# Patient Record
Sex: FEMALE | Race: OTHER | HISPANIC OR LATINO
[De-identification: names, ages, dates, MRNs, and addresses within clinical notes are randomized per-mention and may not be internally consistent; named-entity substitution may affect disease eponyms.]

---

## 2017-03-23 ENCOUNTER — APPOINTMENT (OUTPATIENT)
Dept: OTOLARYNGOLOGY | Facility: CLINIC | Age: 52
End: 2017-03-23

## 2017-03-23 VITALS — HEART RATE: 97 BPM | DIASTOLIC BLOOD PRESSURE: 87 MMHG | OXYGEN SATURATION: 96 % | SYSTOLIC BLOOD PRESSURE: 121 MMHG

## 2017-12-16 ENCOUNTER — EMERGENCY (EMERGENCY)
Facility: HOSPITAL | Age: 52
LOS: 1 days | Discharge: HOME | End: 2017-12-16
Attending: EMERGENCY MEDICINE | Admitting: EMERGENCY MEDICINE
Payer: COMMERCIAL

## 2017-12-16 VITALS
TEMPERATURE: 98 F | DIASTOLIC BLOOD PRESSURE: 91 MMHG | OXYGEN SATURATION: 100 % | RESPIRATION RATE: 18 BRPM | HEART RATE: 97 BPM | SYSTOLIC BLOOD PRESSURE: 154 MMHG

## 2017-12-16 DIAGNOSIS — E89.0 POSTPROCEDURAL HYPOTHYROIDISM: Chronic | ICD-10-CM

## 2017-12-16 LAB
ALBUMIN SERPL ELPH-MCNC: 3.9 G/DL — SIGNIFICANT CHANGE UP (ref 3.3–5)
ALP SERPL-CCNC: 40 U/L — SIGNIFICANT CHANGE UP (ref 40–120)
ALT FLD-CCNC: 10 U/L — SIGNIFICANT CHANGE UP (ref 4–33)
APTT BLD: 35.3 SEC — SIGNIFICANT CHANGE UP (ref 27.5–37.4)
AST SERPL-CCNC: 13 U/L — SIGNIFICANT CHANGE UP (ref 4–32)
BASOPHILS # BLD AUTO: 0.06 K/UL — SIGNIFICANT CHANGE UP (ref 0–0.2)
BASOPHILS NFR BLD AUTO: 0.8 % — SIGNIFICANT CHANGE UP (ref 0–2)
BILIRUB SERPL-MCNC: 0.3 MG/DL — SIGNIFICANT CHANGE UP (ref 0.2–1.2)
BUN SERPL-MCNC: 11 MG/DL — SIGNIFICANT CHANGE UP (ref 7–23)
CALCIUM SERPL-MCNC: 8.5 MG/DL — SIGNIFICANT CHANGE UP (ref 8.4–10.5)
CHLORIDE SERPL-SCNC: 104 MMOL/L — SIGNIFICANT CHANGE UP (ref 98–107)
CK MB BLD-MCNC: 1.54 NG/ML — SIGNIFICANT CHANGE UP (ref 1–4.7)
CK MB BLD-MCNC: 1.8 NG/ML — SIGNIFICANT CHANGE UP (ref 1–4.7)
CK MB BLD-MCNC: SIGNIFICANT CHANGE UP (ref 0–2.5)
CK SERPL-CCNC: 71 U/L — SIGNIFICANT CHANGE UP (ref 25–170)
CK SERPL-CCNC: 84 U/L — SIGNIFICANT CHANGE UP (ref 25–170)
CO2 SERPL-SCNC: 26 MMOL/L — SIGNIFICANT CHANGE UP (ref 22–31)
CREAT SERPL-MCNC: 0.7 MG/DL — SIGNIFICANT CHANGE UP (ref 0.5–1.3)
D DIMER BLD IA.RAPID-MCNC: 170 NG/ML — SIGNIFICANT CHANGE UP
EOSINOPHIL # BLD AUTO: 0.14 K/UL — SIGNIFICANT CHANGE UP (ref 0–0.5)
EOSINOPHIL NFR BLD AUTO: 2 % — SIGNIFICANT CHANGE UP (ref 0–6)
GLUCOSE SERPL-MCNC: 66 MG/DL — LOW (ref 70–99)
HBA1C BLD-MCNC: 8.1 % — HIGH (ref 4–5.6)
HCG SERPL-ACNC: < 5 MIU/ML — SIGNIFICANT CHANGE UP
HCT VFR BLD CALC: 43.9 % — SIGNIFICANT CHANGE UP (ref 34.5–45)
HGB BLD-MCNC: 14.3 G/DL — SIGNIFICANT CHANGE UP (ref 11.5–15.5)
IMM GRANULOCYTES # BLD AUTO: 0.02 # — SIGNIFICANT CHANGE UP
IMM GRANULOCYTES NFR BLD AUTO: 0.3 % — SIGNIFICANT CHANGE UP (ref 0–1.5)
INR BLD: 0.95 — SIGNIFICANT CHANGE UP (ref 0.88–1.17)
LYMPHOCYTES # BLD AUTO: 1.96 K/UL — SIGNIFICANT CHANGE UP (ref 1–3.3)
LYMPHOCYTES # BLD AUTO: 27.6 % — SIGNIFICANT CHANGE UP (ref 13–44)
MCHC RBC-ENTMCNC: 29.4 PG — SIGNIFICANT CHANGE UP (ref 27–34)
MCHC RBC-ENTMCNC: 32.6 % — SIGNIFICANT CHANGE UP (ref 32–36)
MCV RBC AUTO: 90.1 FL — SIGNIFICANT CHANGE UP (ref 80–100)
MONOCYTES # BLD AUTO: 0.51 K/UL — SIGNIFICANT CHANGE UP (ref 0–0.9)
MONOCYTES NFR BLD AUTO: 7.2 % — SIGNIFICANT CHANGE UP (ref 2–14)
NEUTROPHILS # BLD AUTO: 4.41 K/UL — SIGNIFICANT CHANGE UP (ref 1.8–7.4)
NEUTROPHILS NFR BLD AUTO: 62.1 % — SIGNIFICANT CHANGE UP (ref 43–77)
NRBC # FLD: 0 — SIGNIFICANT CHANGE UP
PLATELET # BLD AUTO: 353 K/UL — SIGNIFICANT CHANGE UP (ref 150–400)
PMV BLD: 9.8 FL — SIGNIFICANT CHANGE UP (ref 7–13)
POTASSIUM SERPL-MCNC: 4 MMOL/L — SIGNIFICANT CHANGE UP (ref 3.5–5.3)
POTASSIUM SERPL-SCNC: 4 MMOL/L — SIGNIFICANT CHANGE UP (ref 3.5–5.3)
PROT SERPL-MCNC: 7.1 G/DL — SIGNIFICANT CHANGE UP (ref 6–8.3)
PROTHROM AB SERPL-ACNC: 10.5 SEC — SIGNIFICANT CHANGE UP (ref 9.8–13.1)
RBC # BLD: 4.87 M/UL — SIGNIFICANT CHANGE UP (ref 3.8–5.2)
RBC # FLD: 13.6 % — SIGNIFICANT CHANGE UP (ref 10.3–14.5)
SODIUM SERPL-SCNC: 141 MMOL/L — SIGNIFICANT CHANGE UP (ref 135–145)
T3 SERPL-MCNC: 64.8 NG/DL — LOW (ref 80–200)
T4 AB SER-ACNC: 6.3 UG/DL — SIGNIFICANT CHANGE UP (ref 5.1–13)
TROPONIN T SERPL-MCNC: < 0.06 NG/ML — SIGNIFICANT CHANGE UP (ref 0–0.06)
TROPONIN T SERPL-MCNC: < 0.06 NG/ML — SIGNIFICANT CHANGE UP (ref 0–0.06)
TSH SERPL-MCNC: 3.26 UIU/ML — SIGNIFICANT CHANGE UP (ref 0.27–4.2)
WBC # BLD: 7.1 K/UL — SIGNIFICANT CHANGE UP (ref 3.8–10.5)
WBC # FLD AUTO: 7.1 K/UL — SIGNIFICANT CHANGE UP (ref 3.8–10.5)

## 2017-12-16 PROCEDURE — 99220: CPT

## 2017-12-16 PROCEDURE — 71010: CPT | Mod: 26

## 2017-12-16 RX ORDER — KETOROLAC TROMETHAMINE 30 MG/ML
30 SYRINGE (ML) INJECTION ONCE
Qty: 0 | Refills: 0 | Status: DISCONTINUED | OUTPATIENT
Start: 2017-12-16 | End: 2017-12-16

## 2017-12-16 RX ORDER — LEVOTHYROXINE SODIUM 125 MCG
200 TABLET ORAL DAILY
Qty: 0 | Refills: 0 | Status: DISCONTINUED | OUTPATIENT
Start: 2017-12-17 | End: 2017-12-20

## 2017-12-16 RX ADMIN — Medication 30 MILLIGRAM(S): at 23:10

## 2017-12-16 NOTE — ED CDU PROVIDER INITIAL DAY NOTE - MEDICAL DECISION MAKING DETAILS
53 y/o female Type 1 DM c/o palpitations and chest pressure  -r/o acs  -tele, ce x 2, ddimer, nuclear stress test

## 2017-12-16 NOTE — ED ADULT NURSE NOTE - OBJECTIVE STATEMENT
Pt received to rm 27, a&ox3, s/p thyroidectomy in 2003 due to thyroid CA and been taking synthroid ever since. Pt reports to ED today with c/o palpitations, chest pain, sob, and feeling tired over the past 3 days - states s/s stopped as soon as pt walked into ED. Pt currently taking synthroid 200mcg daily for the past year. EKG performed in triage, sinus tachycardia on cm, vs as noted. Family member by bedside. Awaiting MD merchant.

## 2017-12-16 NOTE — ED CDU PROVIDER INITIAL DAY NOTE - OBJECTIVE STATEMENT
52F DM1, thyroid ca s/p thyroidectomy now on synthroid, presents with palpitations. Says she has been having 15-20% part of her day with her heart experiencing palpitations which is not normal for her. She sometimes get palpitations when her sugars are high and was concerned. Patient says also for the past 4 days has been having chest pressure following pilates work out with pressure sensation on her left and right shoulder. Has noticed that she has been developing chest pressure when she exerts herself and now becomes more short of breath after walking up half a flight of stairs. She denies n/v/d/f/c.    CDU Note: Agree with above. 53 y/o female type 1 DM c/o palpitations and chest pressure x 3-4 days worsening. In ED ce x 1 negative, ekg normal, ddimer negative. Sent to CDU for r/o acs - tele, ce x 2, nuclear stress test. Pt. currently asymptomatic resting comfortably.

## 2017-12-16 NOTE — ED PROVIDER NOTE - OBJECTIVE STATEMENT
52F DM1, thyroid ca s/p thyroidectomy now on synthroid, presents with palpitations. Says she has been having 15-20% part of her day with her heart experiencing palpitations which is not normal for her. She sometimes get palpitations when her sugars are high and was concerned. Patient says also for the past 4 days has been having chest pressure following pilates work out with pressure sensation on her left and right shoulder. Has noticed that she has been developing chest pressure when she exerts herself and now becomes more short of breath after walking up half a flight of stairs. She denies n/v/d/f/c.

## 2017-12-16 NOTE — ED PROVIDER NOTE - ATTENDING CONTRIBUTION TO CARE
I performed a face-to-face evaluation of the patient and performed a history and physical examination. I agree with the history and physical examination.    52 F, DM, p/w palpitations and chest heaviness w/ exertion (not related to hypoglycemia). No VTE risks. No F/C. Appears well. NAD. Afebrile. Vital signs unremarkable. Strong pulse. Respirations unlabored. No LE edema. Concern for CAD. Admit to CDU for cardiac stress test.

## 2017-12-16 NOTE — ED CDU PROVIDER INITIAL DAY NOTE - ATTENDING CONTRIBUTION TO CARE
52F h/o DM, thyroid cancer s/p thyroidectomy presents with palpitations and CP.  Reports CP is a heaviness, worse w exertion.  + SOB.  No leg pain or swelling.  No recent illness, no cough, no fever.  In ED EKG NSR, no acute ischemia, labs including TSH, trop negative. On exam NAD, mmm, lungs CTAB, reg tachycardia, abdomen soft NT/ND, no edema, 2+ pulses b/l, neuro A&Ox3, no focal deficits, skin warm and dry, no rash.  Plan for observation for serial troponin and stress.  Plan for d-dimer given tachycardia.

## 2017-12-17 VITALS
DIASTOLIC BLOOD PRESSURE: 77 MMHG | HEART RATE: 91 BPM | OXYGEN SATURATION: 98 % | RESPIRATION RATE: 16 BRPM | TEMPERATURE: 99 F | SYSTOLIC BLOOD PRESSURE: 131 MMHG

## 2017-12-17 PROCEDURE — 93018 CV STRESS TEST I&R ONLY: CPT | Mod: GC

## 2017-12-17 PROCEDURE — 99217: CPT

## 2017-12-17 PROCEDURE — 93016 CV STRESS TEST SUPVJ ONLY: CPT | Mod: GC

## 2017-12-17 PROCEDURE — 78452 HT MUSCLE IMAGE SPECT MULT: CPT | Mod: 26

## 2017-12-17 PROCEDURE — 93306 TTE W/DOPPLER COMPLETE: CPT | Mod: 26

## 2017-12-17 RX ADMIN — Medication 200 MICROGRAM(S): at 06:21

## 2017-12-17 RX ADMIN — Medication 30 MILLIGRAM(S): at 01:00

## 2017-12-17 NOTE — ED CDU PROVIDER SUBSEQUENT DAY NOTE - SKIN, MLM
Skin normal color for race, warm, dry and intact. No evidence of rash.
Skin normal color for race, warm, dry and intact.

## 2017-12-17 NOTE — ED CDU PROVIDER DISPOSITION NOTE - CLINICAL COURSE
52F h/o DM, thyroid cancer s/p thyroidectomy presents with palpitations and CP, worse w exertion. + SOB.  EKG NSR, no acute ischemia, labs including TSH, serial trop, d-dimer negative. Stress negative.  ECHO normal.  Medically stable for discharge home with cardiology follow-up..

## 2017-12-17 NOTE — ED CDU PROVIDER SUBSEQUENT DAY NOTE - RESPIRATORY, MLM
Breath sounds clear and equal bilaterally.
Breath sounds clear and equal bilaterally. No rales, rhonchi or wheezing b/l. +reproducible b/l pectoral pain

## 2017-12-17 NOTE — ED CDU PROVIDER DISPOSITION NOTE - PLAN OF CARE
Follow up with your primary care provider and/or cardiologist within 48-72 hours. Show copies of your reports given to you. Recommend Aspirin 81mg over the counter daily until further evaluation.  Take all of your other medications as previously prescribed. Worsening or continued chest pain, shortness of breath, weakness, return to ED.

## 2017-12-17 NOTE — ED CDU PROVIDER SUBSEQUENT DAY NOTE - MUSCULOSKELETAL, MLM
Spine appears normal, range of motion is not limited, no joint or bony tenderness. Full ROM of all joints b/l. 5/5 strength
Spine appears normal, range of motion is not limited, no muscle or joint tenderness

## 2017-12-17 NOTE — ED CDU PROVIDER SUBSEQUENT DAY NOTE - ATTENDING CONTRIBUTION TO CARE
52F h/o DM, thyroid cancer s/p thyroidectomy presents with palpitations and CP.  Reports CP is a heaviness, worse w exertion.  + SOB.  No leg pain or swelling.  No recent illness, no cough, no fever.  In ED EKG NSR, no acute ischemia, labs including TSH, trop negative. Today feels well, no CP, palpitations or SOB overnight. On exam NAD, mmm, lungs CTAB, reg tachycardia, abdomen soft NT/ND, no edema, 2+ pulses b/l, neuro A&Ox3, no focal deficits, skin warm and dry, no rash.  Plan for stress test.

## 2017-12-17 NOTE — ED CDU PROVIDER SUBSEQUENT DAY NOTE - FAMILY HISTORY
Father  Still living? Unknown  Family history of atrial fibrillation, Age at diagnosis: Age Unknown
Father  Still living? Unknown  Family history of atrial fibrillation, Age at diagnosis: Age Unknown

## 2017-12-17 NOTE — ED CDU PROVIDER SUBSEQUENT DAY NOTE - CARDIAC, MLM
Normal rate, regular rhythm.  Heart sounds S1, S2.
Normal rate, regular rhythm.  Heart sounds S1, S2.  No murmurs, rubs or gallops. No LE edema. No calf tenderness b/l.

## 2017-12-17 NOTE — ED CDU PROVIDER SUBSEQUENT DAY NOTE - MEDICAL DECISION MAKING DETAILS
53 y/o female with pmhx of DM 1 with insulin pump, thyroid CA s/p thyroidectomy 13 years ago on synthroid, presents to ED for intermittent palpitations and ALEJANDRE x 3 days. will r/o ACS. CE negative x2. plan: stress and echo AM, toradol for pectoral pain, reassess, continue tele monitoring, outpt endo follow up
53 y/o female with pmhx of DM 1 with insulin pump, thyroid CA s/p thyroidectomy 13 years ago on synthroid, presents to ED for intermittent palpitations and ALEJANDRE x 3 days. will r/o ACS. CE negative x2. CDU plan: stress and echo AM, toradol for pectoral pain, reassess, continue tele monitoring, outpt endo follow up

## 2017-12-17 NOTE — ED CDU PROVIDER SUBSEQUENT DAY NOTE - HISTORY
53 y/o female with pmhx of DM 1 with insulin pump, thyroid CA s/p thyroidectomy 13 years ago on synthroid, presents to ED for intermittent palpitations and SOB x 3 days. Thought her sugars were low but they have been stable, feels different than her hypoglycemia in past. SOB at rest and ALEJANDRE walking up stairs with palpitations. c/o b/l pectoral chest pain x 3 days after rowing heavily at the gym, worse with movement, constant, non radiating, nonpleuritic, nonexertional. States she has been taking high dose 200mcg synthroid for 1 year to prevent reactivation of possible cancer still left over in her neck found on recent sonograms as per her endocrinologist. Currently asymptomatic, feeling well. No recent travel, surgeries, estrogen use, immobilization/hospitalization, leg swelling, calf pain, abd pain, n/v, weakness, numbness. Pt was sent to CDU to get stress and echo, pt denies any complaints at CDU after the stress and echo was done,  Patient laying comfortably in bed NAD, No complaints. Will continue to monitor.
51 y/o female with pmhx of DM 1 with insulin pump, thyroid CA s/p thyroidectomy 13 years ago on synthroid, presents to ED for intermittent palpitations and SOB x 3 days. Thought her sugars were low but they have been stable, feels different than her hypoglycemia in past. SOB at rest and ALEJANDRE walking up stairs with palpitations. c/o b/l pectoral chest pain x 3 days after rowing heavily at the gym, worse with movement, constant, non radiating, nonpleuritic, nonexertional. States she has been taking high dose 200mcg synthroid for 1 year to prevent reactivation of possible cancer still left over in her neck found on recent sonograms as per her endocrinologist. Currently asymptomatic, feeling well. No recent travel, surgeries, estrogen use, immobilization/hospitalization, leg swelling, calf pain, abd pain, n/v, weakness, numbness.

## 2018-01-31 ENCOUNTER — APPOINTMENT (OUTPATIENT)
Dept: INTERNAL MEDICINE | Facility: CLINIC | Age: 53
End: 2018-01-31
Payer: COMMERCIAL

## 2018-01-31 VITALS
HEART RATE: 91 BPM | OXYGEN SATURATION: 97 % | TEMPERATURE: 98 F | HEIGHT: 67 IN | DIASTOLIC BLOOD PRESSURE: 70 MMHG | SYSTOLIC BLOOD PRESSURE: 140 MMHG | BODY MASS INDEX: 31.71 KG/M2 | WEIGHT: 202 LBS

## 2018-01-31 DIAGNOSIS — Z82.61 FAMILY HISTORY OF ARTHRITIS: ICD-10-CM

## 2018-01-31 DIAGNOSIS — Z82.49 FAMILY HISTORY OF ISCHEMIC HEART DISEASE AND OTHER DISEASES OF THE CIRCULATORY SYSTEM: ICD-10-CM

## 2018-01-31 LAB
APPEARANCE: CLEAR
BILIRUBIN URINE: NEGATIVE
BLOOD URINE: NEGATIVE
COLOR: YELLOW
GLUCOSE QUALITATIVE U: 500 MG/DL
KETONES URINE: NEGATIVE
LEUKOCYTE ESTERASE URINE: NEGATIVE
NITRITE URINE: NEGATIVE
PH URINE: 6.5
PROTEIN URINE: NEGATIVE MG/DL
SPECIFIC GRAVITY URINE: 1.01
UROBILINOGEN URINE: NEGATIVE MG/DL

## 2018-01-31 PROCEDURE — 99386 PREV VISIT NEW AGE 40-64: CPT | Mod: 25

## 2018-01-31 PROCEDURE — 82270 OCCULT BLOOD FECES: CPT

## 2018-02-01 LAB
25(OH)D3 SERPL-MCNC: 37.6 NG/ML
ALBUMIN SERPL ELPH-MCNC: 3.9 G/DL
ALP BLD-CCNC: 45 U/L
ALT SERPL-CCNC: 17 U/L
ANION GAP SERPL CALC-SCNC: 16 MMOL/L
AST SERPL-CCNC: 17 U/L
BASOPHILS # BLD AUTO: 0.03 K/UL
BASOPHILS NFR BLD AUTO: 0.5 %
BILIRUB SERPL-MCNC: 0.3 MG/DL
BUN SERPL-MCNC: 11 MG/DL
CALCIUM SERPL-MCNC: 8.8 MG/DL
CHLORIDE SERPL-SCNC: 98 MMOL/L
CHOLEST SERPL-MCNC: 170 MG/DL
CHOLEST/HDLC SERPL: 2.4 RATIO
CO2 SERPL-SCNC: 24 MMOL/L
CREAT SERPL-MCNC: 0.8 MG/DL
CREAT SPEC-SCNC: 57 MG/DL
EOSINOPHIL # BLD AUTO: 0.2 K/UL
EOSINOPHIL NFR BLD AUTO: 3.7 %
FRUCTOSAMINE SERPL-MCNC: 310 UMOL/L
GLUCOSE SERPL-MCNC: 242 MG/DL
HBA1C MFR BLD HPLC: 7.4 %
HBV SURFACE AB SER QL: NONREACTIVE
HBV SURFACE AG SER QL: NONREACTIVE
HCT VFR BLD CALC: 41.1 %
HCV AB SER QL: NONREACTIVE
HCV S/CO RATIO: 0.14 S/CO
HDLC SERPL-MCNC: 70 MG/DL
HGB BLD-MCNC: 13.1 G/DL
IMM GRANULOCYTES NFR BLD AUTO: 0.2 %
LDLC SERPL CALC-MCNC: 87 MG/DL
LYMPHOCYTES # BLD AUTO: 1.52 K/UL
LYMPHOCYTES NFR BLD AUTO: 27.8 %
MAN DIFF?: NORMAL
MCHC RBC-ENTMCNC: 29.8 PG
MCHC RBC-ENTMCNC: 31.9 GM/DL
MCV RBC AUTO: 93.6 FL
MICROALBUMIN 24H UR DL<=1MG/L-MCNC: <0.3 MG/DL
MICROALBUMIN/CREAT 24H UR-RTO: NORMAL
MONOCYTES # BLD AUTO: 0.37 K/UL
MONOCYTES NFR BLD AUTO: 6.8 %
NEUTROPHILS # BLD AUTO: 3.34 K/UL
NEUTROPHILS NFR BLD AUTO: 61 %
PLATELET # BLD AUTO: 350 K/UL
POTASSIUM SERPL-SCNC: 4.2 MMOL/L
PROT SERPL-MCNC: 7.5 G/DL
RBC # BLD: 4.39 M/UL
RBC # FLD: 13.7 %
SODIUM SERPL-SCNC: 138 MMOL/L
TRIGL SERPL-MCNC: 65 MG/DL
TSH SERPL-ACNC: 1.51 UIU/ML
VZV AB TITR SER: POSITIVE
VZV IGG SER IF-ACNC: 298.9 INDEX
WBC # FLD AUTO: 5.47 K/UL

## 2018-03-22 ENCOUNTER — APPOINTMENT (OUTPATIENT)
Dept: OTOLARYNGOLOGY | Facility: CLINIC | Age: 53
End: 2018-03-22
Payer: COMMERCIAL

## 2018-03-22 VITALS
OXYGEN SATURATION: 98 % | HEART RATE: 91 BPM | TEMPERATURE: 98.2 F | SYSTOLIC BLOOD PRESSURE: 140 MMHG | DIASTOLIC BLOOD PRESSURE: 89 MMHG

## 2018-03-22 PROCEDURE — 31575 DIAGNOSTIC LARYNGOSCOPY: CPT

## 2018-03-22 PROCEDURE — 99214 OFFICE O/P EST MOD 30 MIN: CPT | Mod: 25

## 2018-03-22 RX ORDER — BLOOD-GLUCOSE SENSOR
EACH MISCELLANEOUS
Qty: 5 | Refills: 0 | Status: ACTIVE | COMMUNITY
Start: 2017-10-11

## 2018-05-21 ENCOUNTER — APPOINTMENT (OUTPATIENT)
Dept: INTERNAL MEDICINE | Facility: CLINIC | Age: 53
End: 2018-05-21
Payer: COMMERCIAL

## 2018-05-21 VITALS — SYSTOLIC BLOOD PRESSURE: 124 MMHG | DIASTOLIC BLOOD PRESSURE: 70 MMHG

## 2018-05-21 VITALS
OXYGEN SATURATION: 99 % | RESPIRATION RATE: 16 BRPM | TEMPERATURE: 97.4 F | HEART RATE: 91 BPM | HEIGHT: 67 IN | DIASTOLIC BLOOD PRESSURE: 86 MMHG | SYSTOLIC BLOOD PRESSURE: 136 MMHG | BODY MASS INDEX: 30.61 KG/M2 | WEIGHT: 195 LBS

## 2018-05-21 DIAGNOSIS — T14.8XXA OTHER INJURY OF UNSPECIFIED BODY REGION, INITIAL ENCOUNTER: ICD-10-CM

## 2018-05-21 DIAGNOSIS — H00.014 HORDEOLUM EXTERNUM LEFT UPPER EYELID: ICD-10-CM

## 2018-05-21 PROCEDURE — 99215 OFFICE O/P EST HI 40 MIN: CPT

## 2018-05-21 RX ORDER — NITROFURANTOIN MACROCRYSTALS 100 MG/1
100 CAPSULE ORAL
Qty: 14 | Refills: 0 | Status: COMPLETED | COMMUNITY
Start: 2017-09-27 | End: 2018-05-21

## 2018-05-21 NOTE — HISTORY OF PRESENT ILLNESS
[FreeTextEntry1] : hypertension stye in eye [de-identified] : She has seen endocrinologist and also has stable ln.  she has level 3 nodes and will continue surveillance. She also has lost 7 lbs and has been exercising and eating healthy.  She states she thinks she pulled a muscle in her left thigh while doing Pilates.  No bruising and pain has improving.  .  her blood sugars were elevated and hbA1c 7.4 % and is trying ti imp;rove her numbers.  she also was started on lisinopril by the cardiologist.

## 2018-05-21 NOTE — COUNSELING
[Weight management counseling provided] : Weight management [Healthy eating counseling provided] : healthy eating [Activity counseling provided] : activity [Target Wt Loss Goal ___] : Target weight loss goal [unfilled] lbs [Weight Self Once Weekly] : Weight self once weekly [Keep Food Diary] : Keep food diary [Low Fat Diet] : Low fat diet [Decrease Portions] : Decrease food portions [Low Salt Diet] : Low salt diet [___ min/wk activity recommended] : [unfilled] min/wk activity recommended [Walking] : Walking [Sleep ___ hours/day] : Sleep [unfilled] hours/day [None] : None [Good understanding] : Patient has a good understanding of lifestyle changes and the steps needed to achieve self management goals

## 2018-05-21 NOTE — PHYSICAL EXAM
[No Acute Distress] : no acute distress [Well Nourished] : well nourished [Well Developed] : well developed [Well-Appearing] : well-appearing [Normal Outer Ear/Nose] : the outer ears and nose were normal in appearance [Normal Oropharynx] : the oropharynx was normal [No JVD] : no jugular venous distention [Supple] : supple [No Lymphadenopathy] : no lymphadenopathy [Thyroid Normal, No Nodules] : the thyroid was normal and there were no nodules present [No Respiratory Distress] : no respiratory distress  [Clear to Auscultation] : lungs were clear to auscultation bilaterally [No Accessory Muscle Use] : no accessory muscle use [Normal Rate] : normal rate  [Regular Rhythm] : with a regular rhythm [Normal S1, S2] : normal S1 and S2 [No Murmur] : no murmur heard [No Carotid Bruits] : no carotid bruits [No Abdominal Bruit] : a ~M bruit was not heard ~T in the abdomen [No Varicosities] : no varicosities [Pedal Pulses Present] : the pedal pulses are present [No Edema] : there was no peripheral edema [No Extremity Clubbing/Cyanosis] : no extremity clubbing/cyanosis [No Palpable Aorta] : no palpable aorta [Soft] : abdomen soft [Non Tender] : non-tender [Non-distended] : non-distended [No Masses] : no abdominal mass palpated [No HSM] : no HSM [Normal Bowel Sounds] : normal bowel sounds [Normal Posterior Cervical Nodes] : no posterior cervical lymphadenopathy [Normal Anterior Cervical Nodes] : no anterior cervical lymphadenopathy [No CVA Tenderness] : no CVA  tenderness [No Spinal Tenderness] : no spinal tenderness [No Joint Swelling] : no joint swelling [Grossly Normal Strength/Tone] : grossly normal strength/tone [No Rash] : no rash [Normal Gait] : normal gait [Coordination Grossly Intact] : coordination grossly intact [No Focal Deficits] : no focal deficits [Deep Tendon Reflexes (DTR)] : deep tendon reflexes were 2+ and symmetric [Normal Affect] : the affect was normal [Normal Insight/Judgement] : insight and judgment were intact [de-identified] : left eye upper lid a small stye noted.

## 2018-05-21 NOTE — ASSESSMENT
[FreeTextEntry1] : dm Pt is trying to lose weight and exercise and lower her hgbA1c and also ldl was elevated and we discussed it should be in the 70s ,StyeA sty is a red, painful lump near the edge of your eyelid that may look like a boil or a pimple. Sties are often filled with pus. A sty usually forms on the outside of your eyelid. But sometimes it can form on the inner part of your eyelid.\par \par In most cases, a sty will begin to disappear on its own in a couple days. In the meantime, you may be able to relieve the pain or discomfort of a sty by applying a warm washcloth to your eyelid.\par \par \par Symptoms\par \par Signs and symptoms of a sty include:\par •A red lump on your eyelid that is similar to a boil or a pimple\par •Eyelid pain\par •Eyelid swelling\par •Tearing\par \par Another condition that causes inflammation of the eyelid is a chalazion. A chalazion occurs when there's a blockage in one of the small oil glands at the margin of the eyelid, just behind the eyelashes. Unlike a sty, a chalazion usually isn't painful and tends to be most prominent on the inner side of the eyelid. Treatment for both conditions is similar.\par \par When to see a doctor\par \par Most sties are harmless to your eye and won't affect your ability to see clearly. Try self-care measures first, such as applying a warm washcloth to your closed eyelid for 5 to 10 minutes several times a day and gently massaging the eyelid. Contact your doctor if:\par •The sty doesn't start to improve after 48 hours\par •Redness and swelling extend beyond your eyelid and involve your cheek or other parts of your face\par \par Causes\par \par A sty is caused by an infection of oil glands in the eyelid. The bacterium staphylococcus is responsible for most of these infections.\par \par Risk factors\par \par You are at increased risk of a sty if you:\par •Touch your eyes with unwashed hands\par •Insert your contact lenses without thoroughly disinfecting them or washing your hands first\par •Leave on eye makeup overnight\par •Use old or  cosmetics\par •Have blepharitis, a chronic inflammation along the edge of the eyelid\par •Have rosacea, a skin condition characterized by facial redness\par \par Prevention\par \par To prevent eye infections:\par •Wash your hands. Wash your hands with soap and warm water or use an alcohol-based hand  several times each day. Keep your hands away from your eyes.\par •Take care with cosmetics. Reduce your risk of recurrent eye infections by throwing away old cosmetics. Don't share your cosmetics with others. Don't wear eye makeup overnight.\par •Make sure your contact lenses are clean. If you wear contact lenses, wash your hands thoroughly before handling your contacts and follow your doctor's advice on disinfecting them.\par •Apply warm compresses. If you've had a sty before, using a compress regularly may help prevent it from coming back.\par •Manage blepharitis. If you have blepharitis, follow your doctor's instructions for caring for your eyes.\par muscle strainStretching may take a back seat to your exercise routine. The main concern is exercising, not stretching, right?\par \par Not so fast. Stretching may help you:\par •Improve your joint range of motion\par •Improve your athletic performance\par •Decrease your risk of injury\par \par Understand why stretching can help and how to stretch correctly.\par \par Studies about the benefits of stretching have had mixed results. Some show that stretching helps. Other studies show that stretching before or after exercise has little to no benefit.\par \par Some research shows that stretching doesn't reduce muscle soreness after exercise, and other studies show that static stretching performed immediately before a sprint event may slightly worsen performance.\par \par Stretching can help improve flexibility, and, consequently, range of motion about your joints. Better flexibility may:\par •Improve your performance in physical activities\par •Decrease your risk of injuries\par •Help your joints move through their full range of motion\par •Enable your muscles to work most effectively\par \par Stretching also increases blood flow to the muscle. You may learn to enjoy the ritual of stretching before or after hitting the trail, ballet floor or soccer field.\par \par Before you plunge into stretching, make sure you do it safely and effectively. While you can stretch anytime, anywhere, be sure to use proper technique. Stretching incorrectly can actually do more harm than good.\par \par Use these tips to keep stretching safe:\par \par •Don't consider stretching a warmup. You may hurt yourself if you stretch cold muscles. Before stretching, warm up with light walking, jogging or biking at low intensity for five to 10 minutes. Even better, stretch after your workout when your muscles are warm.\par \par Consider skipping stretching before an intense activity, such as sprinting or track and field activities. Some research suggests that pre-event stretching may actually decrease performance. Research has also shown that stretching immediately before an event weakens hamstring strength.\par \par Instead of static stretching, try performing a "dynamic warmup." A dynamic warm-up involves performing movements similar to those in your sport or physical activity at a low level, then gradually increasing the speed and intensity as you warm up.\par \par •Strive for symmetry. Everyone's genetics for flexibility are a bit different. Rather than striving for the flexibility of a dancer or gymnast, focus on having equal flexibility side to side (especially if you have a history of a previous injury). Flexibility that is not equal on both sides may be a risk factor for injury.\par \par \par •Focus on major muscle groups. Concentrate your stretches on major muscle groups such as your calves, thighs, hips, lower back, neck and shoulders. Make sure that you stretch both sides.\par \par Also stretch muscles and joints that you routinely use.\par \par •Don't bounce. Stretch in a smooth movement, without bouncing. Bouncing as you stretch can injure your muscle and actually contribute to muscle tightness.\par •Hold your stretch. Breathe normally and hold each stretch for about 30 seconds; in problem areas, you may need to hold for around 60 seconds.\par •Don't aim for pain. Expect to feel tension while you're stretching, not pain. If it hurts, you've pushed too far. Back off to the point where you don't feel any pain, then hold the stretch.\par •Make stretches sport specific. Some evidence suggests that it's helpful to do stretches involving the muscles used most in your sport or activity. If you play soccer, for instance, stretch your hamstrings as you're more vulnerable to hamstring strains. So opt for stretches that help your hamstrings.\par \par •Keep up with your stretching. Stretching can be time-consuming. But you can achieve the most benefits by stretching regularly, at least two to three times a week.\par \par Skipping regular stretching means you risk losing the potential benefits. For instance, if stretching helped you increase your range of motion, your range of motion may decrease again if you stop stretching.\par \par \par •Bring movement into your stretching. Gentle movements, such as those in dale chi or yoga, can help you be more flexible in specific movements. These types of exercises can also help reduce falls in seniors.\par \par Remember the "dynamic warmup:" If you're going to perform a specific activity, such as a kick in martial arts or kicking a soccer ball, start out slowly and at low intensity to get your muscles used to it. Then speed up gradually.\par \par \par You might need to approach stretching with caution. If you have a chronic condition or an injury, you might need to adjust your stretching techniques. For example, if you already have a strained muscle, stretching it may cause further harm.\par \par Remember that stretching doesn't mean you can't get injured. Stretching, for instance, won't prevent an overuse injury. Talk to your doctor or physical therapist about the most appropriate way to stretch if you have any health concerns  pt would like a new cardiologist.  She will have her reports sent to me and colonoscopy form Gi.  \par

## 2018-06-05 ENCOUNTER — APPOINTMENT (OUTPATIENT)
Dept: CARDIOLOGY | Facility: CLINIC | Age: 53
End: 2018-06-05
Payer: COMMERCIAL

## 2018-06-05 VITALS
HEIGHT: 67 IN | HEART RATE: 79 BPM | DIASTOLIC BLOOD PRESSURE: 76 MMHG | OXYGEN SATURATION: 96 % | BODY MASS INDEX: 31.55 KG/M2 | WEIGHT: 201 LBS | TEMPERATURE: 97.9 F | SYSTOLIC BLOOD PRESSURE: 114 MMHG

## 2018-06-05 PROCEDURE — 99244 OFF/OP CNSLTJ NEW/EST MOD 40: CPT

## 2018-06-05 PROCEDURE — 93000 ELECTROCARDIOGRAM COMPLETE: CPT

## 2018-07-27 PROBLEM — E03.9 HYPOTHYROIDISM, UNSPECIFIED: Chronic | Status: ACTIVE | Noted: 2017-12-16

## 2018-07-27 PROBLEM — E10.9 TYPE 1 DIABETES MELLITUS WITHOUT COMPLICATIONS: Chronic | Status: ACTIVE | Noted: 2017-12-16

## 2018-08-01 ENCOUNTER — APPOINTMENT (OUTPATIENT)
Dept: CARDIOLOGY | Facility: CLINIC | Age: 53
End: 2018-08-01
Payer: COMMERCIAL

## 2018-08-01 VITALS
WEIGHT: 200 LBS | BODY MASS INDEX: 31.39 KG/M2 | HEIGHT: 67 IN | TEMPERATURE: 97.8 F | DIASTOLIC BLOOD PRESSURE: 74 MMHG | HEART RATE: 96 BPM | OXYGEN SATURATION: 98 % | SYSTOLIC BLOOD PRESSURE: 124 MMHG

## 2018-08-01 PROCEDURE — 99214 OFFICE O/P EST MOD 30 MIN: CPT

## 2018-08-01 RX ORDER — METFORMIN ER 500 MG 500 MG/1
500 TABLET ORAL
Qty: 60 | Refills: 0 | Status: COMPLETED | COMMUNITY
Start: 2017-10-03 | End: 2018-08-01

## 2018-09-18 ENCOUNTER — APPOINTMENT (OUTPATIENT)
Dept: CARDIOLOGY | Facility: CLINIC | Age: 53
End: 2018-09-18

## 2018-09-24 ENCOUNTER — APPOINTMENT (OUTPATIENT)
Dept: INTERNAL MEDICINE | Facility: CLINIC | Age: 53
End: 2018-09-24

## 2018-10-02 ENCOUNTER — APPOINTMENT (OUTPATIENT)
Dept: OTOLARYNGOLOGY | Facility: CLINIC | Age: 53
End: 2018-10-02
Payer: COMMERCIAL

## 2018-10-02 VITALS
DIASTOLIC BLOOD PRESSURE: 87 MMHG | TEMPERATURE: 98.1 F | OXYGEN SATURATION: 99 % | RESPIRATION RATE: 17 BRPM | HEART RATE: 103 BPM | SYSTOLIC BLOOD PRESSURE: 142 MMHG

## 2018-10-02 PROCEDURE — 31575 DIAGNOSTIC LARYNGOSCOPY: CPT

## 2018-10-02 PROCEDURE — 99214 OFFICE O/P EST MOD 30 MIN: CPT | Mod: 25

## 2018-12-04 ENCOUNTER — MEDICATION RENEWAL (OUTPATIENT)
Age: 53
End: 2018-12-04

## 2019-01-29 ENCOUNTER — EMERGENCY (EMERGENCY)
Facility: HOSPITAL | Age: 54
LOS: 1 days | Discharge: HOME | End: 2019-01-29
Attending: EMERGENCY MEDICINE | Admitting: EMERGENCY MEDICINE
Payer: COMMERCIAL

## 2019-01-29 ENCOUNTER — OTHER (OUTPATIENT)
Age: 54
End: 2019-01-29

## 2019-01-29 VITALS
HEART RATE: 97 BPM | SYSTOLIC BLOOD PRESSURE: 138 MMHG | RESPIRATION RATE: 16 BRPM | OXYGEN SATURATION: 100 % | DIASTOLIC BLOOD PRESSURE: 80 MMHG | TEMPERATURE: 99 F

## 2019-01-29 DIAGNOSIS — E89.0 POSTPROCEDURAL HYPOTHYROIDISM: Chronic | ICD-10-CM

## 2019-01-29 LAB
APPEARANCE UR: CLEAR — SIGNIFICANT CHANGE UP
BASE EXCESS BLDV CALC-SCNC: 3.3 MMOL/L — SIGNIFICANT CHANGE UP
BASOPHILS # BLD AUTO: 0.06 K/UL — SIGNIFICANT CHANGE UP (ref 0–0.2)
BASOPHILS NFR BLD AUTO: 0.8 % — SIGNIFICANT CHANGE UP (ref 0–2)
BILIRUB UR-MCNC: NEGATIVE — SIGNIFICANT CHANGE UP
BLOOD GAS VENOUS - CREATININE: 0.52 MG/DL — SIGNIFICANT CHANGE UP (ref 0.5–1.3)
BLOOD UR QL VISUAL: NEGATIVE — SIGNIFICANT CHANGE UP
CHLORIDE BLDV-SCNC: 108 MMOL/L — SIGNIFICANT CHANGE UP (ref 96–108)
COLOR SPEC: SIGNIFICANT CHANGE UP
EOSINOPHIL # BLD AUTO: 0.21 K/UL — SIGNIFICANT CHANGE UP (ref 0–0.5)
EOSINOPHIL NFR BLD AUTO: 2.9 % — SIGNIFICANT CHANGE UP (ref 0–6)
GAS PNL BLDV: 134 MMOL/L — LOW (ref 136–146)
GLUCOSE BLDV-MCNC: 95 — SIGNIFICANT CHANGE UP (ref 70–99)
GLUCOSE UR-MCNC: NEGATIVE — SIGNIFICANT CHANGE UP
HCO3 BLDV-SCNC: 25 MMOL/L — SIGNIFICANT CHANGE UP (ref 20–27)
HCT VFR BLD CALC: 43 % — SIGNIFICANT CHANGE UP (ref 34.5–45)
HCT VFR BLDV CALC: 42.2 % — SIGNIFICANT CHANGE UP (ref 34.5–45)
HGB BLD-MCNC: 13.5 G/DL — SIGNIFICANT CHANGE UP (ref 11.5–15.5)
HGB BLDV-MCNC: 13.8 G/DL — SIGNIFICANT CHANGE UP (ref 11.5–15.5)
IMM GRANULOCYTES NFR BLD AUTO: 0.4 % — SIGNIFICANT CHANGE UP (ref 0–1.5)
KETONES UR-MCNC: NEGATIVE — SIGNIFICANT CHANGE UP
LACTATE BLDV-MCNC: 0.9 MMOL/L — SIGNIFICANT CHANGE UP (ref 0.5–2)
LEUKOCYTE ESTERASE UR-ACNC: NEGATIVE — SIGNIFICANT CHANGE UP
LYMPHOCYTES # BLD AUTO: 1.93 K/UL — SIGNIFICANT CHANGE UP (ref 1–3.3)
LYMPHOCYTES # BLD AUTO: 26.4 % — SIGNIFICANT CHANGE UP (ref 13–44)
MCHC RBC-ENTMCNC: 29.1 PG — SIGNIFICANT CHANGE UP (ref 27–34)
MCHC RBC-ENTMCNC: 31.4 % — LOW (ref 32–36)
MCV RBC AUTO: 92.7 FL — SIGNIFICANT CHANGE UP (ref 80–100)
MONOCYTES # BLD AUTO: 0.49 K/UL — SIGNIFICANT CHANGE UP (ref 0–0.9)
MONOCYTES NFR BLD AUTO: 6.7 % — SIGNIFICANT CHANGE UP (ref 2–14)
NEUTROPHILS # BLD AUTO: 4.6 K/UL — SIGNIFICANT CHANGE UP (ref 1.8–7.4)
NEUTROPHILS NFR BLD AUTO: 62.8 % — SIGNIFICANT CHANGE UP (ref 43–77)
NITRITE UR-MCNC: NEGATIVE — SIGNIFICANT CHANGE UP
NRBC # FLD: 0 K/UL — LOW (ref 25–125)
PCO2 BLDV: 50 MMHG — SIGNIFICANT CHANGE UP (ref 41–51)
PH BLDV: 7.37 PH — SIGNIFICANT CHANGE UP (ref 7.32–7.43)
PH UR: 7 — SIGNIFICANT CHANGE UP (ref 5–8)
PLATELET # BLD AUTO: 350 K/UL — SIGNIFICANT CHANGE UP (ref 150–400)
PMV BLD: 9.7 FL — SIGNIFICANT CHANGE UP (ref 7–13)
PO2 BLDV: 26 MMHG — LOW (ref 35–40)
POTASSIUM BLDV-SCNC: 3.5 MMOL/L — SIGNIFICANT CHANGE UP (ref 3.4–4.5)
PROT UR-MCNC: NEGATIVE — SIGNIFICANT CHANGE UP
RBC # BLD: 4.64 M/UL — SIGNIFICANT CHANGE UP (ref 3.8–5.2)
RBC # FLD: 13.5 % — SIGNIFICANT CHANGE UP (ref 10.3–14.5)
SAO2 % BLDV: 38.3 % — LOW (ref 60–85)
SP GR SPEC: 1.02 — SIGNIFICANT CHANGE UP (ref 1–1.04)
UROBILINOGEN FLD QL: NORMAL — SIGNIFICANT CHANGE UP
WBC # BLD: 7.32 K/UL — SIGNIFICANT CHANGE UP (ref 3.8–10.5)
WBC # FLD AUTO: 7.32 K/UL — SIGNIFICANT CHANGE UP (ref 3.8–10.5)

## 2019-01-29 PROCEDURE — 99284 EMERGENCY DEPT VISIT MOD MDM: CPT

## 2019-01-29 NOTE — ED PROVIDER NOTE - OBJECTIVE STATEMENT
52 Y/O F PMH DMI MARIA L ROBBINS 54 Y/O F PMH DMI PSH , thyroidectomy D+ C presents with 1-2 months of worsening lower abdominal fullness with intermittent cramping. Pt states she has been having normal BM'd and eating normally. She states she the pain feels dull and "like it's my ovaries." She denies acute pain, has a H/O known umbilical hernia which is also asymptomatic, pt denies any other acute symptoms or complaints.

## 2019-01-29 NOTE — ED PROVIDER NOTE - ABDOMINAL EXAM
soft/Easily reducible umbilical hernia no rebound or guarding no other tenderness to palpation abdomen is soft.

## 2019-01-29 NOTE — ED PROVIDER NOTE - ATTENDING CONTRIBUTION TO CARE
I performed a face-to-face evaluation of the patient and performed a history and physical examination. I agree with the history and physical examination.    Wiliam: Pelvic bloating/distension over 2 months. NAD. No abd tenderness or tympany. Considerations include ascites, IBS, ovarian cancer. Check labs. Discuss w/ pt. whether she'd consider staying for US and CT or would prefer to do as outpatient. I performed a face-to-face evaluation of the patient and performed a history and physical examination. I agree with the history and physical examination.    Wiliam: Pelvic bloating/distension over 2 months. NAD. No abd tenderness or tympany. Considerations include ascites, IBS, ovarian cancer. Check labs. Pt. would like to stay for CT and US and can go home after the study but before results; will call w/ results [phone].

## 2019-01-29 NOTE — ED ADULT TRIAGE NOTE - CHIEF COMPLAINT QUOTE
pt c/o upper abdominal pain and distention, worsening x2 weeks. denies n/v. states she has been belching a lot when she eats. last BM, today, normal. pt has Hx of umbilical hernia, DM type 1.

## 2019-01-29 NOTE — ED PROVIDER NOTE - NSFOLLOWUPINSTRUCTIONS_ED_ALL_ED_FT
Follow up with your primary doctor, OBGYN and a gastroenterologist. Referral is included in your discharge packet and you can also call  for an appointment. Discuss your results with your primary doctor including the cyst on your liver. Continue all previously prescribed medications as directed.  Follow up with your primary care physician in 48-72 hours- bring copies of your results.  Return to the ER for worsening or persistent symptoms, and/or ANY NEW OR CONCERNING SYMPTOMS. If you have issues obtaining follow up, please call: [phone] to obtain a doctor or specialist who takes your insurance in your area.  You may call [phone] to make an appointment with the internal medicine clinic.

## 2019-01-29 NOTE — ED PROVIDER NOTE - MEDICAL DECISION MAKING DETAILS
Wiliam: Pelvic bloating/distension over 2 months. NAD. No abd tenderness or tympany. Considerations include ascites, IBS, ovarian cancer. Check labs. Discuss w/ pt. whether she'd consider staying for US and CT or would prefer to do as outpatient. Wiliam: Pelvic bloating/distension over 2 months. NAD. No abd tenderness or tympany. Considerations include ascites, IBS, ovarian cancer. Check labs. Pt. would like to stay for CT and US and can go home after the study but before results; will call w/ results [phone].

## 2019-01-29 NOTE — ED PROVIDER NOTE - CARE PLAN
Principal Discharge DX:	Abdominal distension Principal Discharge DX:	Abdominal distension  Secondary Diagnosis:	Ovarian cyst  Secondary Diagnosis:	Fibroid  Secondary Diagnosis:	Liver cyst

## 2019-01-30 VITALS
OXYGEN SATURATION: 100 % | RESPIRATION RATE: 18 BRPM | HEART RATE: 90 BPM | TEMPERATURE: 98 F | SYSTOLIC BLOOD PRESSURE: 111 MMHG | DIASTOLIC BLOOD PRESSURE: 80 MMHG

## 2019-01-30 LAB
ALBUMIN SERPL ELPH-MCNC: 4.6 G/DL — SIGNIFICANT CHANGE UP (ref 3.3–5)
ALP SERPL-CCNC: 45 U/L — SIGNIFICANT CHANGE UP (ref 40–120)
ALT FLD-CCNC: 12 U/L — SIGNIFICANT CHANGE UP (ref 4–33)
ANION GAP SERPL CALC-SCNC: 12 MMO/L — SIGNIFICANT CHANGE UP (ref 7–14)
AST SERPL-CCNC: 14 U/L — SIGNIFICANT CHANGE UP (ref 4–32)
BILIRUB SERPL-MCNC: 0.3 MG/DL — SIGNIFICANT CHANGE UP (ref 0.2–1.2)
BUN SERPL-MCNC: 12 MG/DL — SIGNIFICANT CHANGE UP (ref 7–23)
CALCIUM SERPL-MCNC: 9.5 MG/DL — SIGNIFICANT CHANGE UP (ref 8.4–10.5)
CHLORIDE SERPL-SCNC: 100 MMOL/L — SIGNIFICANT CHANGE UP (ref 98–107)
CO2 SERPL-SCNC: 27 MMOL/L — SIGNIFICANT CHANGE UP (ref 22–31)
CREAT SERPL-MCNC: 0.65 MG/DL — SIGNIFICANT CHANGE UP (ref 0.5–1.3)
GLUCOSE SERPL-MCNC: 95 MG/DL — SIGNIFICANT CHANGE UP (ref 70–99)
POTASSIUM SERPL-MCNC: 4.1 MMOL/L — SIGNIFICANT CHANGE UP (ref 3.5–5.3)
POTASSIUM SERPL-SCNC: 4.1 MMOL/L — SIGNIFICANT CHANGE UP (ref 3.5–5.3)
PROT SERPL-MCNC: 7.8 G/DL — SIGNIFICANT CHANGE UP (ref 6–8.3)
SODIUM SERPL-SCNC: 139 MMOL/L — SIGNIFICANT CHANGE UP (ref 135–145)

## 2019-01-30 PROCEDURE — 74177 CT ABD & PELVIS W/CONTRAST: CPT | Mod: 26

## 2019-01-30 PROCEDURE — 76830 TRANSVAGINAL US NON-OB: CPT | Mod: 26

## 2019-01-30 NOTE — ED ADULT NURSE NOTE - CHPI ED NUR SYMPTOMS NEG
no nausea/no chills/no dysuria/no blood in stool/no hematuria/no abdominal distension/no diarrhea/no fever

## 2019-01-30 NOTE — ED ADULT NURSE NOTE - IN THE PAST 12 MONTHS HAVE YOU USED DRUGS OTHER THAN THOSE REQUIRED FOR MEDICAL REASON?
Parent will come to Dr. Li's office to : form.   Patient was advised of location and hours: Yes.   Patient was advised to bring photo identification: Yes.   Patient elects another party to  item: no.   No

## 2019-01-30 NOTE — ED ADULT NURSE NOTE - OBJECTIVE STATEMENT
Patient presents today with complaints of urinary symptoms: abdominal pain, urinary frequency that started about two weeks ago. she is alert and oriented x 4, S1, S2 regular, respirations are even and unlabored, abdomen is soft and nontender , no edema noted, ambulatory. 20 gauge saline lock placed on right AC, blood drawn and sent. Will follow up.

## 2019-01-31 LAB
BACTERIA UR CULT: SIGNIFICANT CHANGE UP
SPECIMEN SOURCE: SIGNIFICANT CHANGE UP

## 2019-02-04 ENCOUNTER — APPOINTMENT (OUTPATIENT)
Dept: INTERNAL MEDICINE | Facility: CLINIC | Age: 54
End: 2019-02-04
Payer: COMMERCIAL

## 2019-02-04 ENCOUNTER — LABORATORY RESULT (OUTPATIENT)
Age: 54
End: 2019-02-04

## 2019-02-04 VITALS
WEIGHT: 200 LBS | SYSTOLIC BLOOD PRESSURE: 143 MMHG | HEART RATE: 100 BPM | TEMPERATURE: 98.4 F | OXYGEN SATURATION: 98 % | BODY MASS INDEX: 31.39 KG/M2 | DIASTOLIC BLOOD PRESSURE: 79 MMHG | HEIGHT: 67 IN

## 2019-02-04 PROCEDURE — 99215 OFFICE O/P EST HI 40 MIN: CPT

## 2019-02-04 RX ORDER — METFORMIN HYDROCHLORIDE 500 MG/1
500 TABLET, COATED ORAL DAILY
Qty: 30 | Refills: 0 | Status: COMPLETED | COMMUNITY
End: 2019-02-04

## 2019-02-04 RX ORDER — INSULIN ASPART 100 [IU]/ML
100 INJECTION, SOLUTION INTRAVENOUS; SUBCUTANEOUS
Qty: 20 | Refills: 0 | Status: COMPLETED | COMMUNITY
Start: 2017-09-06 | End: 2019-02-04

## 2019-02-04 NOTE — DATA REVIEWED
[FreeTextEntry1] : pt sees her endocrinologist who manages her blood tests and diabetes and thyroid

## 2019-02-04 NOTE — PHYSICAL EXAM
[Well Developed] : well developed [Well Nourished] : well nourished [Normal Sclera/Conjunctiva] : normal sclera/conjunctiva [PERRL] : pupils equal round and reactive to light [Normal Oropharynx] : the oropharynx was normal [No JVD] : no jugular venous distention [Supple] : supple [No Respiratory Distress] : no respiratory distress  [Clear to Auscultation] : lungs were clear to auscultation bilaterally [No Accessory Muscle Use] : no accessory muscle use [Normal Rate] : normal rate  [Regular Rhythm] : with a regular rhythm [Normal S1, S2] : normal S1 and S2 [No Edema] : there was no peripheral edema [Soft, Nontender] : the abdomen was soft and nontender [No Mass] : no masses were palpated [No HSM] : no hepatosplenomegaly noted [Abdomen Hernia Umbilical] : an umbilical hernia was present [] : which was reducible [None] : no CVA tenderness [Normal Posterior Cervical Nodes] : no posterior cervical lymphadenopathy [Normal Anterior Cervical Nodes] : no anterior cervical lymphadenopathy [No CVA Tenderness] : no CVA  tenderness [No Spinal Tenderness] : no spinal tenderness [Normal Station and Gait] : the gait and station were normal [Normal Motor Tone] : the muscle tone was normal [Involuntary Movements] : no involuntary movements were seen [Normal Scalp] : inspection of the scalp showed no abnormalities [Examination Of The Hair] : texture and distribution of hair was normal [Abnormal Color] : normal color and pigmentation [Complexion Medium] : medium complexion [Skin Lesions 1] : no skin lesions were observed [Skin Turgor Decreased] : normal skin turgor [Normal] : the deep tendon reflexes were normal [Normal Mental Status] : the patient's orientation, memory, attention, language and fund of knowledge were normal [Appropriate] : appropriate [Impaired judgment] : intact judgment [Impaired Insight] : intact insight [Comprehensive Foot Exam Normal] : Right and left foot were examined and both feet are normal. No ulcers in either foot. Toes are normal and with full ROM.  Normal tactile sensation with monofilament testing throughout both feet

## 2019-02-04 NOTE — ASSESSMENT
[FreeTextEntry1] : abd pain I think she had a ruptures ovarian cyst and will fu with her gyn.  She will have testing for hpylori and also have celiac and food allergy testing. she will have fu for her diabetes and thyroid cancer with her endocrinologist.  she will need cpe and will make an appt.  she had flu vaccine sept 2018 If she develops pain she will go to er. She has had colon cancer screening.  she has an umbilical hernia and it is reducible and I discussed findings of incarcerated hernia and what to look for.   Lung nodule pt will have ct of lungs and will see pulmonary for her sob  it could be unrelated and due to poor conditioning but with her hx of papillary thyroid cancer  I want to make sure  she has ct of lung to see if there are other nodules  of her lungs and fu of lung nodule as needed.  .

## 2019-02-04 NOTE — HISTORY OF PRESENT ILLNESS
[FreeTextEntry1] : abd pain   [de-identified] : pt called and was sent to er for acute abdominal pain. she had ct of abd which didn’t reveal acute pathology but has liver cysts and and nodule in left lower lobe of lung.  She had ovary cysts.  she is still menstruating.  She should go to her gyn.  She had pelvic free fluid and could have had rupture of a cyst.  She has made an appt for her to see him.  she is not having abdominal pain. she states she thinks milk makes her feel bloating.  She doesn't have diarrhea and has normal bm and normal color.  she has been feeling breathless recently .  It occurs with exertion and not at rest and doesn’t have chest pain.

## 2019-02-05 ENCOUNTER — LABORATORY RESULT (OUTPATIENT)
Age: 54
End: 2019-02-05

## 2019-02-05 ENCOUNTER — APPOINTMENT (OUTPATIENT)
Dept: PULMONOLOGY | Facility: CLINIC | Age: 54
End: 2019-02-05
Payer: COMMERCIAL

## 2019-02-05 VITALS
BODY MASS INDEX: 31.39 KG/M2 | HEART RATE: 83 BPM | OXYGEN SATURATION: 100 % | TEMPERATURE: 98.6 F | DIASTOLIC BLOOD PRESSURE: 94 MMHG | WEIGHT: 200 LBS | SYSTOLIC BLOOD PRESSURE: 151 MMHG | HEIGHT: 67 IN

## 2019-02-05 LAB
APPEARANCE: CLEAR
BILIRUBIN URINE: NEGATIVE
BLOOD URINE: NEGATIVE
COLOR: YELLOW
GLUCOSE QUALITATIVE U: NEGATIVE MG/DL
IGA SER QL IEP: 207 MG/DL
KETONES URINE: NEGATIVE
LEUKOCYTE ESTERASE URINE: NEGATIVE
NITRITE URINE: NEGATIVE
PH URINE: 7
PROTEIN URINE: NEGATIVE MG/DL
SPECIFIC GRAVITY URINE: 1.01
UROBILINOGEN URINE: NEGATIVE MG/DL

## 2019-02-05 PROCEDURE — 94726 PLETHYSMOGRAPHY LUNG VOLUMES: CPT

## 2019-02-05 PROCEDURE — ZZZZZ: CPT

## 2019-02-05 PROCEDURE — 94729 DIFFUSING CAPACITY: CPT

## 2019-02-05 PROCEDURE — 94010 BREATHING CAPACITY TEST: CPT

## 2019-02-05 PROCEDURE — 99203 OFFICE O/P NEW LOW 30 MIN: CPT | Mod: 25

## 2019-02-06 ENCOUNTER — FORM ENCOUNTER (OUTPATIENT)
Age: 54
End: 2019-02-06

## 2019-02-06 LAB — UREA BREATH TEST QL: NEGATIVE

## 2019-02-06 NOTE — PHYSICAL EXAM
[General Appearance - Well Developed] : well developed [Normal Appearance] : normal appearance [Well Groomed] : well groomed [General Appearance - Well Nourished] : well nourished [No Deformities] : no deformities [General Appearance - In No Acute Distress] : no acute distress [Normal Conjunctiva] : the conjunctiva exhibited no abnormalities [Normal Oropharynx] : normal oropharynx [I] : I [Neck Appearance] : the appearance of the neck was normal [Neck Cervical Mass (___cm)] : no neck mass was observed [Jugular Venous Distention Increased] : there was no jugular-venous distention [Thyroid Diffuse Enlargement] : the thyroid was not enlarged [Thyroid Nodule] : there were no palpable thyroid nodules [Heart Rate And Rhythm] : heart rate and rhythm were normal [Heart Sounds] : normal S1 and S2 [Murmurs] : no murmurs present [Arterial Pulses Normal] : the arterial pulses were normal [Edema] : no peripheral edema present [Respiration, Rhythm And Depth] : normal respiratory rhythm and effort [Exaggerated Use Of Accessory Muscles For Inspiration] : no accessory muscle use [Auscultation Breath Sounds / Voice Sounds] : lungs were clear to auscultation bilaterally [Bowel Sounds] : normal bowel sounds [Abdomen Soft] : soft [Abdomen Tenderness] : non-tender [] : no hepato-splenomegaly [Abnormal Walk] : normal gait [Nail Clubbing] : no clubbing of the fingernails [Cyanosis, Localized] : no localized cyanosis [Skin Color & Pigmentation] : normal skin color and pigmentation [Skin Turgor] : normal skin turgor [Cranial Nerves] : cranial nerves 2-12 were intact [No Focal Deficits] : no focal deficits [Oriented To Time, Place, And Person] : oriented to person, place, and time [Impaired Insight] : insight and judgment were intact

## 2019-02-07 ENCOUNTER — APPOINTMENT (OUTPATIENT)
Dept: CT IMAGING | Facility: HOSPITAL | Age: 54
End: 2019-02-07
Payer: COMMERCIAL

## 2019-02-07 ENCOUNTER — OUTPATIENT (OUTPATIENT)
Dept: OUTPATIENT SERVICES | Facility: HOSPITAL | Age: 54
LOS: 1 days | End: 2019-02-07
Payer: COMMERCIAL

## 2019-02-07 DIAGNOSIS — E10.9 TYPE 1 DIABETES MELLITUS WITHOUT COMPLICATIONS: ICD-10-CM

## 2019-02-07 DIAGNOSIS — E89.0 POSTPROCEDURAL HYPOTHYROIDISM: Chronic | ICD-10-CM

## 2019-02-07 DIAGNOSIS — C73 MALIGNANT NEOPLASM OF THYROID GLAND: ICD-10-CM

## 2019-02-07 DIAGNOSIS — R91.1 SOLITARY PULMONARY NODULE: ICD-10-CM

## 2019-02-07 PROCEDURE — 71250 CT THORAX DX C-: CPT

## 2019-02-07 PROCEDURE — 71250 CT THORAX DX C-: CPT | Mod: 26

## 2019-02-08 LAB
BARLEY IGE QN: <0.1 KUA/L
CHERRY IGE QN: <0.1 KUA/L
COW MILK IGE QN: 0.33 KUA/L
CRAB IGE QN: <0.1 KUA/L
DEPRECATED BARLEY IGE RAST QL: 0
DEPRECATED CHERRY IGE RAST QL: 0
DEPRECATED COW MILK IGE RAST QL: NORMAL
DEPRECATED CRAB IGE RAST QL: 0
DEPRECATED EGG WHITE IGE RAST QL: 0
DEPRECATED OAT IGE RAST QL: NORMAL
DEPRECATED PEANUT IGE RAST QL: 0
DEPRECATED RYE IGE RAST QL: 0
DEPRECATED SOYBEAN IGE RAST QL: 0
DEPRECATED WHEAT IGE RAST QL: 0
EGG WHITE IGE QN: <0.1 KUA/L
OAT IGE QN: 0.33 KUA/L
PEANUT IGE QN: <0.1 KUA/L
RYE IGE QN: <0.1 KUA/L
SOYBEAN IGE QN: <0.1 KUA/L
TOTAL IGE SMQN RAST: 33 KU/L
TTG IGA SER IA-ACNC: 6.7 UNITS
TTG IGA SER-ACNC: NEGATIVE
TTG IGG SER IA-ACNC: <5 UNITS
TTG IGG SER IA-ACNC: NEGATIVE
WHEAT IGE QN: <0.1 KUA/L

## 2019-02-14 ENCOUNTER — APPOINTMENT (OUTPATIENT)
Dept: THORACIC SURGERY | Facility: CLINIC | Age: 54
End: 2019-02-14
Payer: COMMERCIAL

## 2019-02-14 VITALS
WEIGHT: 200 LBS | TEMPERATURE: 98.5 F | HEIGHT: 67 IN | DIASTOLIC BLOOD PRESSURE: 90 MMHG | HEART RATE: 100 BPM | SYSTOLIC BLOOD PRESSURE: 147 MMHG | OXYGEN SATURATION: 96 % | RESPIRATION RATE: 18 BRPM | BODY MASS INDEX: 31.39 KG/M2

## 2019-02-14 PROCEDURE — 99203 OFFICE O/P NEW LOW 30 MIN: CPT

## 2019-02-14 RX ORDER — OMEGA-3/DHA/EPA/FISH OIL 300-1000MG
CAPSULE ORAL
Refills: 0 | Status: ACTIVE | COMMUNITY

## 2019-02-14 NOTE — CONSULT LETTER
[Dear  ___] : Dear  [unfilled], [Consult Letter:] : I had the pleasure of evaluating your patient, [unfilled]. [( Thank you for referring [unfilled] for consultation for _____ )] : Thank you for referring [unfilled] for consultation for [unfilled] [Please see my note below.] : Please see my note below. [Consult Closing:] : Thank you very much for allowing me to participate in the care of this patient.  If you have any questions, please do not hesitate to contact me. [Sincerely,] : Sincerely, [DrAdalberto  ___] : Dr. LARA [DrAdalberto ___] : Dr. LARA [FreeTextEntry2] : Dr. Wayne Morales (Pulm/ref)\par Dr. Louis Yates(PCP)\par Dr. Vishal Jackson (Cardio)\par  [FreeTextEntry3] : Jessica Baptiste MD\par Attending Surgeon\par Division of Thoracic Surgery\par , [facility] [employer] [facility]\par

## 2019-02-14 NOTE — DATA REVIEWED
[FreeTextEntry1] : CT chest on 2/7/19:\par - 3 mm noncalcified nodule in RML\par - 4 mm nodule in LLL\par - Rt hepatic cyst

## 2019-02-14 NOTE — HISTORY OF PRESENT ILLNESS
[FreeTextEntry1] : 53 y.o. female, former smoker, with hx of thyroid CA s/p thyroidectomy and radiation, Type I DM on insulin pump, who recently presented to ED for abdominal pain. In the ED, CT abd was done, found to have nodule in LLL. Pt then went to see Pulmonologist and was sent for a CT chest. \par \par CT chest on 2/7/19:\par - 3 mm noncalcified nodule in RML\par - 4 mm nodule in LLL\par - Rt hepatic cyst\par \par Pt is here today for an initial thoracic surgery, referred by Pulm Dr. Wayne Morales. Pt reports SOB on exertion, denies cough, dyspnea, hemoptysis, fever, chills or CP.

## 2019-02-14 NOTE — ASSESSMENT
[FreeTextEntry1] : 53 y.o. female, former smoker, with hx of thyroid CA s/p thyroidectomy and radiation, Type I DM on insulin pump, who recently presented to ED for abdominal pain. In the ED, CT abd was done, found to have nodule in LLL. Pt then went to see Pulmonologist and was sent for a CT chest. \par \par CT chest on 2/7/19:\par - 3 mm noncalcified nodule in RML\par - 4 mm nodule in LLL\par - Rt hepatic cyst\par \par I have reviewed the patient's medical records and diagnostic images at time of this office consultation and have made the following recommendation:\par 1. CT reviewed with pt. Small lung nodules, I would like to continue monitoring at this time. RTC in 3 months with CT chest w/o contrast. \par \par Written by Ami Gannon NP, acting as a scribe for Dr. Kayce Avalos. \par \par The documentation recorded by the scribe accurately reflects the service I personally performed and the decisions made by me. KAYCE AVALOS MD\par

## 2019-02-14 NOTE — REVIEW OF SYSTEMS
[As Noted in HPI] : as noted in HPI [SOB on Exertion] : shortness of breath during exertion [Negative] : Heme/Lymph [Wheezing] : no wheezing [Cough] : no cough

## 2019-02-14 NOTE — PHYSICAL EXAM
[General Appearance - Alert] : alert [General Appearance - In No Acute Distress] : in no acute distress [Sclera] : the sclera and conjunctiva were normal [Extraocular Movements] : extraocular movements were intact [Outer Ear] : the ears and nose were normal in appearance [Oropharynx] : the oropharynx was normal [Neck Appearance] : the appearance of the neck was normal [Neck Cervical Mass (___cm)] : no neck mass was observed [Jugular Venous Distention Increased] : there was no jugular-venous distention [Auscultation Breath Sounds / Voice Sounds] : lungs were clear to auscultation bilaterally [Heart Rate And Rhythm] : heart rate was normal and rhythm regular [Heart Sounds] : normal S1 and S2 [Heart Sounds Gallop] : no gallops [Murmurs] : no murmurs [Heart Sounds Pericardial Friction Rub] : no pericardial rub [Examination Of The Chest] : the chest was normal in appearance [Chest Visual Inspection Thoracic Asymmetry] : no chest asymmetry [Diminished Respiratory Excursion] : normal chest expansion [2+] : left 2+ [Bowel Sounds] : normal bowel sounds [Abdomen Soft] : soft [Abdomen Tenderness] : non-tender [Abdomen Mass (___ Cm)] : no abdominal mass palpated [Cervical Lymph Nodes Enlarged Posterior Bilaterally] : posterior cervical [Cervical Lymph Nodes Enlarged Anterior Bilaterally] : anterior cervical [Supraclavicular Lymph Nodes Enlarged Bilaterally] : supraclavicular [No CVA Tenderness] : no ~M costovertebral angle tenderness [No Spinal Tenderness] : no spinal tenderness [Abnormal Walk] : normal gait [Nail Clubbing] : no clubbing  or cyanosis of the fingernails [Musculoskeletal - Swelling] : no joint swelling seen [Motor Tone] : muscle strength and tone were normal [Skin Color & Pigmentation] : normal skin color and pigmentation [Skin Turgor] : normal skin turgor [] : no rash [Deep Tendon Reflexes (DTR)] : deep tendon reflexes were 2+ and symmetric [Sensation] : the sensory exam was normal to light touch and pinprick [No Focal Deficits] : no focal deficits [Oriented To Time, Place, And Person] : oriented to person, place, and time [Impaired Insight] : insight and judgment were intact [Affect] : the affect was normal [FreeTextEntry1] : voids without difficulty

## 2019-04-01 ENCOUNTER — APPOINTMENT (OUTPATIENT)
Dept: OTOLARYNGOLOGY | Facility: CLINIC | Age: 54
End: 2019-04-01

## 2019-05-14 ENCOUNTER — OTHER (OUTPATIENT)
Age: 54
End: 2019-05-14

## 2019-05-22 ENCOUNTER — FORM ENCOUNTER (OUTPATIENT)
Age: 54
End: 2019-05-22

## 2019-05-23 ENCOUNTER — OUTPATIENT (OUTPATIENT)
Dept: OUTPATIENT SERVICES | Facility: HOSPITAL | Age: 54
LOS: 1 days | End: 2019-05-23
Payer: COMMERCIAL

## 2019-05-23 ENCOUNTER — APPOINTMENT (OUTPATIENT)
Dept: CT IMAGING | Facility: HOSPITAL | Age: 54
End: 2019-05-23
Payer: COMMERCIAL

## 2019-05-23 DIAGNOSIS — R91.1 SOLITARY PULMONARY NODULE: ICD-10-CM

## 2019-05-23 DIAGNOSIS — E89.0 POSTPROCEDURAL HYPOTHYROIDISM: Chronic | ICD-10-CM

## 2019-05-23 DIAGNOSIS — C73 MALIGNANT NEOPLASM OF THYROID GLAND: ICD-10-CM

## 2019-05-23 DIAGNOSIS — E10.9 TYPE 1 DIABETES MELLITUS WITHOUT COMPLICATIONS: ICD-10-CM

## 2019-05-23 PROCEDURE — 71250 CT THORAX DX C-: CPT

## 2019-05-23 PROCEDURE — 71250 CT THORAX DX C-: CPT | Mod: 26

## 2019-05-28 ENCOUNTER — APPOINTMENT (OUTPATIENT)
Dept: INTERNAL MEDICINE | Facility: CLINIC | Age: 54
End: 2019-05-28
Payer: COMMERCIAL

## 2019-05-28 ENCOUNTER — LABORATORY RESULT (OUTPATIENT)
Age: 54
End: 2019-05-28

## 2019-05-28 VITALS
SYSTOLIC BLOOD PRESSURE: 129 MMHG | OXYGEN SATURATION: 100 % | WEIGHT: 213 LBS | HEART RATE: 86 BPM | BODY MASS INDEX: 33.43 KG/M2 | DIASTOLIC BLOOD PRESSURE: 84 MMHG | HEIGHT: 67 IN

## 2019-05-28 VITALS — TEMPERATURE: 98.2 F

## 2019-05-28 LAB
BASOPHILS # BLD AUTO: 0.05 K/UL
BASOPHILS NFR BLD AUTO: 1.3 %
EOSINOPHIL # BLD AUTO: 0.17 K/UL
EOSINOPHIL NFR BLD AUTO: 4.3 %
ESTIMATED AVERAGE GLUCOSE: 151 MG/DL
HBA1C MFR BLD HPLC: 6.9 %
HCT VFR BLD CALC: 46.5 %
HGB BLD-MCNC: 14.2 G/DL
IMM GRANULOCYTES NFR BLD AUTO: 0 %
LYMPHOCYTES # BLD AUTO: 1.12 K/UL
LYMPHOCYTES NFR BLD AUTO: 28.6 %
MAN DIFF?: NORMAL
MCHC RBC-ENTMCNC: 29.5 PG
MCHC RBC-ENTMCNC: 30.5 GM/DL
MCV RBC AUTO: 96.5 FL
MONOCYTES # BLD AUTO: 0.39 K/UL
MONOCYTES NFR BLD AUTO: 9.9 %
NEUTROPHILS # BLD AUTO: 2.19 K/UL
NEUTROPHILS NFR BLD AUTO: 55.9 %
PLATELET # BLD AUTO: 324 K/UL
RBC # BLD: 4.82 M/UL
RBC # FLD: 14.1 %
WBC # FLD AUTO: 3.92 K/UL

## 2019-05-28 PROCEDURE — 99396 PREV VISIT EST AGE 40-64: CPT | Mod: 25

## 2019-05-28 PROCEDURE — 90670 PCV13 VACCINE IM: CPT

## 2019-05-28 PROCEDURE — G0009: CPT

## 2019-05-28 RX ORDER — BLOOD SUGAR DIAGNOSTIC
STRIP MISCELLANEOUS
Qty: 700 | Refills: 0 | Status: ACTIVE | COMMUNITY
Start: 2019-04-25

## 2019-05-28 NOTE — PHYSICAL EXAM
[Well Developed] : well developed [Well Nourished] : well nourished [Normal Voice Quality] : was normal [Normal Verbal Skills] : the patient had normal verbal communication skills [Normal Nonverbal Skills] : normal nonverbal communication skills were demonstrated [Conjunctiva] : the conjunctiva were normal in both eyes [PERRL] : pupils were equal in size, round, and reactive to light [EOM Intact] : extraocular movements were intact [Normal Outer Ear/Nose] : the outer ears and nose were normal in appearance [Normal Oropharynx] : the oropharynx was normal [Normal TMs] : both tympanic membranes were normal [Normal Nasal Mucosa] : the nasal mucosa was normal [Normal Appearance] : was normal in appearance [Neck Supple] : was supple [Absent] : was absent [Rate ___] : at [unfilled] breaths per minute [Clear Bilaterally] : the lungs were clear to auscultation bilaterally [Normal Rhythm/Effort] : normal respiratory rhythm and effort [Normal to Percussion] : the lungs were normal to percussion [5th Left ICS - MCL] : palpated at the 5th LICS in the midclavicular line [Heart Rate ___] : [unfilled] bpm [Rhythm Regular] : regular [Normal Rate] : normal [Normal S1] : normal S1 [Normal S2] : normal S2 [No Murmur] : no murmurs heard [No Pitting Edema] : no pitting edema present [2+] : left 2+ [No Abnormalities] : the abdominal aorta was not enlarged and no bruit was heard [Examination Of The Breasts] : a normal appearance [No Discharge] : no discharge [Soft, Nontender] : the abdomen was soft and nontender [No Mass] : no masses were palpated [No HSM] : no hepatosplenomegaly noted [Abdomen Hernia Umbilical] : an umbilical hernia was present [] : which was reducible [No Lymphangitis] : no lymphangitis observed [Normal Kyphosis] : normal kyphosis [No Visual Abnormalities] : no visible abnormalities [Normal Lordosis] : normal lordosis [No Scoliosis] : no scoliosis [No Tenderness to Palpation] : no spine tenderness on palpation [No Masses] : no masses [Full ROM] : full ROM [No Pain with ROM] : no pain with motion in any direction [Intact] : all sensory within normal limits bilaterally [Normal Station and Gait] : the gait and station were normal [Normal Motor Tone] : the muscle tone was normal [Normal Scalp] : inspection of the scalp showed no abnormalities [Involuntary Movements] : no involuntary movements were seen [Examination Of The Hair] : texture and distribution of hair was normal [Complexion Medium] : medium complexion [Normal] : the deep tendon reflexes were normal [Appropriate] : appropriate [Normal Mental Status] : the patient's orientation, memory, attention, language and fund of knowledge were normal [Comprehensive Foot Exam Normal] : Right and left foot were examined and both feet are normal. No ulcers in either foot. Toes are normal and with full ROM.  Normal tactile sensation with monofilament testing throughout both feet [Enlarged Diffusely] : was not enlarged [S3] : no S3 [S4] : no S4 [Rt] : no varicose veins of the right leg [Left Carotid Bruit] : no bruit heard over the left carotid [Lt] : no varicose veins of the left leg [Right Carotid Bruit] : no bruit heard over the right carotid [Right Femoral Bruit] : no bruit heard over the right femoral artery [Left Femoral Bruit] : no bruit heard over the left femoral artery [Postauricular Lymph Nodes Enlarged Bilaterally] : nodes not enlarged [Preauricular Lymph Nodes Enlarged Bilaterally] : nodes not enlarged [Bruit] : no bruit heard [Suboccipital Lymph Nodes Enlarged Bilaterally] : nodes not enlarged [Submandibular Lymph Nodes Enlarged Bilaterally] : nodes not enlarged [Cervical Lymph Nodes Enlarged Anterior Bilaterally] : nodes not enlarged [Cervical Lymph Nodes Enlarged Posterior Bilaterally] : nodes not enlarged [Submental Lymph Nodes Enlarged] : nodes not enlarged [Axillary Lymph Nodes Enlarged Bilaterally] : nodes not enlarged [Supraclavicular Lymph Nodes Enlarged Bilaterally] : nodes not enlarged [Epitrochlear Lymph Nodes Enlarged Bilaterally] : nodes not enlarged [Femoral Lymph Nodes Enlarged Bilaterally] : nodes not enlarged [Inguinal Lymph Nodes Enlarged Bilaterally] : nodes not enlarged [Abnormal Color] : normal color and pigmentation [Skin Lesions 1] : no skin lesions were observed [Skin Turgor Decreased] : normal skin turgor [Impaired judgment] : intact judgment [Impaired Insight] : intact insight [de-identified] : normal tongue , teeth good repair.   [de-identified] : done by gyn

## 2019-05-28 NOTE — COUNSELING
[Weight management counseling provided] : Weight management [Healthy eating counseling provided] : healthy eating [Activity counseling provided] : activity [Weight Self Once Weekly] : Weight self once weekly [Low Fat Diet] : Low fat diet [Low Salt Diet] : Low salt diet [Decrease Portions] : Decrease food portions [Keep Food Diary] : Keep food diary [___ min/wk activity recommended] : [unfilled] min/wk activity recommended [Walking] : Walking [Sleep ___ hours/day] : Sleep [unfilled] hours/day [Engage in a relaxing activity] : Engage in a relaxing activity [Plan in advance] : Plan in advance [None] : None [Good understanding] : Patient has a good understanding of lifestyle changes and the steps needed to achieve self management goals [Target Wt Loss Goal ___] : Target weight loss goal [unfilled] lbs [de-identified] : ideal [phone]

## 2019-05-28 NOTE — PAST MEDICAL HISTORY
[Menstruating] : menstruating [Definite ___ (Date)] : the last menstrual period was [unfilled] [Total Preg ___] : G[unfilled] [Full Term ___] : Full Term: [unfilled] [Live Births ___] : P[unfilled]  [Living ___] : Living: [unfilled] [AB Induced ___] : elective abortions: [unfilled]  [AB Spont ___] : miscarriages: [unfilled]

## 2019-05-28 NOTE — HISTORY OF PRESENT ILLNESS
[FreeTextEntry1] : cpe [de-identified] : pt is a 53 yr old woman with hx of papillary ca of thyroid and lung nodules. She has seen thoracic and pulmonary and fu is being done.  She has lll , rml nodules and to fu after 3 months which she had Friday .  She is a diabetic on insulin pump and her last hgbA1c was6.5% last month with her endocrinologist.  Dr.Carmen Rodriguez

## 2019-05-28 NOTE — ASSESSMENT
[FreeTextEntry1] : health maintenance -  Bmi 33  risks of obesity and need for diet and eating healthy Obesity is a complex disorder involving an excessive amount of body fat. Obesity isn't just a cosmetic concern. It increases your risk of diseases and health problems, such as heart disease, diabetes and high blood pressure.\par \par Being extremely obese means you are especially likely to have health problems related to your weight.\par \par \par The good news is that even modest weight loss can improve or prevent the health problems associated with obesity. Dietary changes, increased physical activity and behavior changes can help you lose weight. Prescription medications and weight-loss surgery are additional options for treating obesity.\par \par \par \par \par Symptoms\par \par Obesity is diagnosed when your body mass index (BMI) is 30 or higher. Your body mass index is calculated by dividing your weight in kilograms (kg) by your height in meters (m) squared. \par \par \par BMI\par \par Weight status\par \par \par Below 18.5 Underweight \par 18.5-24.9 Normal \par 25.0-29.9 Overweight \par 30.0-34.9 Obese (Class I) \par 35.0-39.9 Obese (Class II) \par 40.0 and higher Extreme obesity (Class III) \par \par For most people, BMI provides a reasonable estimate of body fat. However, BMI doesn't directly measure body fat, so some people, such as muscular athletes, may have a BMI in the obese category even though they don't have excess body fat. Ask your doctor if your BMI is a problem. \par \par When to see a doctor\par \par If you think you may be obese, and especially if you're concerned about weight-related health problems, see your doctor or health care provider. You and your provider can evaluate your health risks and discuss your weight-loss options. \par \par Request an Appointment at [facility]\par \par Causes\par \par Although there are genetic, behavioral and hormonal influences on body weight, obesity occurs when you take in more calories than you burn through exercise and normal daily activities. Your body stores these excess calories as fat.\par \par Obesity can sometimes be traced to a medical cause, such as Prader-Willi syndrome, Cushing's syndrome, and other diseases and conditions. However, these disorders are rare and, in general, the principal causes of obesity are:\par •Inactivity. If you're not very active, you don't burn as many calories. With a sedentary lifestyle, you can easily take in more calories every day than you use through exercise and normal daily activities.\par •Unhealthy diet and eating habits. Weight gain is inevitable if you regularly eat more calories than you burn. And most Americans' diets are too high in calories and are full of fast food and high-calorie beverages.\par \par Risk factors\par \par Obesity usually results from a combination of causes and contributing factors, including:\par •Genetics. Your genes may affect the amount of body fat you store, and where that fat is distributed. Genetics may also play a role in how efficiently your body converts food into energy and how your body burns calories during exercise.\par •Family lifestyle. Obesity tends to run in families. If one or both of your parents are obese, your risk of being obese is increased. That's not just because of genetics. Family members tend to share similar eating and activity habits.\par •Inactivity. If you're not very active, you don't burn as many calories. With a sedentary lifestyle, you can easily take in more calories every day than you burn through exercise and routine daily activities. Having medical problems, such as arthritis, can lead to decreased activity, which contributes to weight gain.\par •Unhealthy diet. A diet that's high in calories, lacking in fruits and vegetables, full of fast food, and laden with high-calorie beverages and oversized portions contributes to weight gain.\par •Medical problems. In some people, obesity can be traced to a medical cause, such as Prader-Willi syndrome, Cushing's syndrome and other conditions. Medical problems, such as arthritis, also can lead to decreased activity, which may result in weight gain.\par •Certain medications. Some medications can lead to weight gain if you don't compensate through diet or activity. These medications include some antidepressants, anti-seizure medications, diabetes medications, antipsychotic medications, steroids and beta blockers.\par •Social and economic issues. Research has linked social and economic factors to obesity. Avoiding obesity is difficult if you don't have safe areas to exercise. Similarly, you may not have been taught healthy ways of cooking, or you may not have money to buy healthier foods. In addition, the people you spend time with may influence your weight — you're more likely to become obese if you have obese friends or relatives.\par •Age. Obesity can occur at any age, even in young children. But as you age, hormonal changes and a less active lifestyle increase your risk of obesity. In addition, the amount of muscle in your body tends to decrease with age. This lower muscle mass leads to a decrease in metabolism. These changes also reduce calorie needs, and can make it harder to keep off excess weight. If you don't consciously control what you eat and become more physically active as you age, you'll likely gain weight.\par •Pregnancy. During pregnancy, a woman's weight necessarily increases. Some women find this weight difficult to lose after the baby is born. This weight gain may contribute to the development of obesity in women.\par •Quitting smoking. Quitting smoking is often associated with weight gain. And for some, it can lead to enough weight gain that the person becomes obese. In the long run, however, quitting smoking is still a greater benefit to your health than continuing to smoke.\par •Lack of sleep. Not getting enough sleep or getting too much sleep can cause changes in hormones that increase your appetite. You may also crave foods high in calories and carbohydrates, which can contribute to weight gain.\par \par Even if you have one or more of these risk factors, it doesn't mean that you're destined to become obese. You can counteract most risk factors through diet, physical activity and exercise, and behavior changes.\par \par Complications\par \par If you're obese, you're more likely to develop a number of potentially serious health problems, including:\par •High triglycerides and low high-density lipoprotein (HDL) cholesterol\par •Type 2 diabetes\par •High blood pressure\par •Metabolic syndrome — a combination of high blood sugar, high blood pressure, high triglycerides and low HDL cholesterol\par •Heart disease\par •Stroke\par •Cancer, including cancer of the uterus, cervix, endometrium, ovaries, breast, colon, rectum, esophagus, liver, gallbladder, pancreas, kidney and prostate\par •Breathing disorders, including sleep apnea, a potentially serious sleep disorder in which breathing repeatedly stops and starts\par •Gallbladder disease\par •Gynecological problems, such as infertility and irregular periods\par •Erectile dysfunction and sexual health issues\par •Nonalcoholic fatty liver disease, a condition in which fat builds up in the liver and can cause inflammation or scarring\par •Osteoarthritis\par \par Quality of life\par \par When you're obese, your overall quality of life may be diminished. You may not be able to do things you used to do, such as participating in enjoyable activities. You may avoid public places. Obese people may even encounter discrimination.\par \par Other weight-related issues that may affect your quality of life include:\par •Depression\par •Disability\par •Sexual problems\par •Shame and guilt\par •Social isolation\par •Lower work achievement\par \par Prevention\par \par Whether you're at risk of becoming obese, currently overweight or at a healthy weight, you can take steps to prevent unhealthy weight gain and related health problems. Not surprisingly, the steps to prevent weight gain are the same as the steps to lose weight: daily exercise, a healthy diet, and a long-term commitment to watch what you eat and drink.\par •Exercise regularly. You need to get 150 to 300 minutes of moderate-intensity activity a week to prevent weight gain. Moderately intense physical activities include fast walking and swimming.\par •Follow a healthy eating plan. Focus on low-calorie, nutrient-dense foods, such as fruits, veg\par she is up to date with mammogram , pap , eye and dental and colonoscopy .  she will find out about dtap vaccine .She is diabetic and should have pneumonia vaccines and we discussed this and will have prevnar today.  HPN controlled . Dm well controlled and will see podiatrist and fu with her eye doctor.   Thyroid cancer she follows with endocrinologist and will do tsh levels and thyroglobulin.  I asked her in future to find out results and send to me .  she will have mammogram and pap sent to me as well.   Hyperlipemia repeat levels.   lung noduels ct repeat unchanged will fu with Dr Baptiste.

## 2019-05-28 NOTE — PLAN
[FreeTextEntry1] : Pneumococcal Conjugate Vaccine (13-Valent) (zachary otero JHONY ju gate vak SEEN, thur TEEN jessica andinot) \par \par COMMON USES:  It is used to prevent infections caused by Streptococcus pneumoniae. \par \par HOW TO USE THIS MEDICINE:  HOW IS THIS DRUG BEST TAKEN? Use this drug as ordered by your doctor. Read all information given to you. Follow all instructions closely. It is given as a shot into a muscle. Your doctor will give this drug. HOW DO I STORE AND/OR THROW OUT THIS DRUG? If you need to store this drug at home, talk with your doctor, nurse, or pharmacist about how to store it. WHAT DO I DO IF I MISS A DOSE? Call your doctor to find out what to do. \par \par CAUTIONS:  Tell all of your health care providers that you take this drug. This includes your doctors, nurses, pharmacists, and dentists. This drug may not protect all people who use it. Talk with the doctor. If your child was born premature, talk with the doctor. Trouble breathing has happened in these children after getting some vaccines. Tell your doctor if you are pregnant or plan on getting pregnant. You will need to talk about the benefits and risks of using this drug while you are pregnant. Tell your doctor if you are breast-feeding. You will need to talk about any risks to your baby. \par \par POSSIBLE SIDE EFFECTS:  WHAT ARE SOME SIDE EFFECTS THAT I NEED TO CALL MY DOCTOR ABOUT RIGHT AWAY? WARNING/CAUTION: Even though it may be rare, some people may have very bad and sometimes deadly side effects when taking a drug. Tell your doctor or get medical help right away if you have any of the following signs or symptoms that may be related to a very bad side effect: Signs of an allergic reaction, like rash; hives; itching; red, swollen, blistered, or peeling skin with or without fever; wheezing; tightness in the chest or throat; trouble breathing, swallowing, or talking; unusual hoarseness; or swelling of the mouth, face, lips, tongue, or throat. WHAT ARE SOME OTHER SIDE EFFECTS OF THIS DRUG? All drugs may cause side effects. However, many people have no side effects or only have minor side effects. Call your doctor or get medical help if any of these side effects or any other side effects bother you or do not go away: For all patients taking this drug: Feeling tired or weak. Headache. Chills. Not hungry. Muscle or joint pain. Redness or swelling where the shot is given. Pain where the shot was given. Irritation where the shot is given. Trouble moving arm where the shot was given. Children: Fever. Feeling fussy. Feeling sleepy. Not able to sleep. These are not all of the side effects that may occur. If you have questions about side effects, call your doctor. Call your doctor for medical advice about side effects. You may report side effects to the FDA at 2-251-SWR-3788. You may also report side effects at http://www.fda.gov/medwatch. \par \par BEFORE USING THIS MEDICINE:  WHAT DO I NEED TO TELL MY DOCTOR BEFORE I TAKE THIS DRUG? TELL YOUR DOCTOR: If you have an allergy to this drug or any part of this drug. TELL YOUR DOCTOR: If you are allergic to any drugs like this one, any other drugs, foods, or other substances. Tell your doctor about the allergy and what signs you had, like rash; hives; itching; shortness of breath; wheezing; cough; swelling of face, lips, tongue, or throat; or any other signs. This drug may interact with other drugs or health problems. Tell your doctor and pharmacist about all of your drugs (prescription or OTC, natural products, vitamins) and health problems. You must check to make sure that it is safe for you to take this drug with all of your drugs and health problems. Do not start, stop, or change the dose of any drug without checking with your doctor. \par \par OVERDOSE:  If you think there has been an overdose, call your poison control center or get medical care right away. Be ready to tell or show what was taken, how much, and when it happened. emergency room (ER) right away. \par \par ADDITIONAL INFORMATION:  If your symptoms or health problems do not get better or if they become worse, call your doctor. Do not share your drugs with others and do not take anyone else's drugs. Keep a list of all your drugs (prescription, natural products, vitamins, OTC) with you. Give this list to your doctor. Talk with the doctor before starting any new drug, including prescription or OTC, natural products, or vitamins. Keep all drugs in a safe place. Keep all drugs out of the reach of children and pets. Throw away unused or  drugs. Do not flush down a toilet or pour down a drain unless you are told to do so. Check with your pharmacist if you have questions about the best way to throw out drugs. There may be drug take-back programs in your area. Some drugs may have another patient information leaflet. Check with your pharmacist. If you have any questions about this drug, please talk with your doctor, nurse, pharmacist, or other health care provider. \par \par Copyright 2019 [employer]. All rights reserved.          \par

## 2019-05-28 NOTE — HEALTH RISK ASSESSMENT
[Very Good] : ~his/her~  mood as very good [No falls in past year] : Patient reported no falls in the past year [0] : 2) Feeling down, depressed, or hopeless: Not at all (0) [No Retinopathy] : No retinopathy [Patient reported mammogram was normal] : Patient reported mammogram was normal [Patient reported PAP Smear was normal] : Patient reported PAP Smear was normal [HIV test declined] : HIV test declined [Patient reported colonoscopy was normal] : Patient reported colonoscopy was normal [Hepatitis C test offered] : Hepatitis C test offered [None] : None [With Family] : lives with family [# of Members in Household ___] :  household currently consist of [unfilled] member(s) [Employed] : employed [Graduate School] : graduate school [# Of Children ___] : has [unfilled] children [] :  [Sexually Active] : sexually active [Feels Safe at Home] : Feels safe at home [Fully functional (bathing, dressing, toileting, transferring, walking, feeding)] : Fully functional (bathing, dressing, toileting, transferring, walking, feeding) [Fully functional (using the telephone, shopping, preparing meals, housekeeping, doing laundry, using] : Fully functional and needs no help or supervision to perform IADLs (using the telephone, shopping, preparing meals, housekeeping, doing laundry, using transportation, managing medications and managing finances) [Smoke Detector] : smoke detector [Carbon Monoxide Detector] : carbon monoxide detector [Seat Belt] :  uses seat belt [Safety elements used in home] : safety elements used in home [Sunscreen] : uses sunscreen [Aggressive treatment] : aggressive treatment [FreeTextEntry1] : none [de-identified] : pulmonary and thoracic  [] : No [de-identified] : Pilates and walks  [de-identified] : healthy diet  [EEB2Txhbt] : 0 [LowDoseCTScan] : 5/23/19 [EyeExamDate] : 6 months ago [Change in mental status noted] : No change in mental status noted [Language] : denies difficulty with language [Behavior] : denies difficulty with behavior [Learning/Retaining New Information] : denies difficulty learning/retaining new information [Handling Complex Tasks] : denies difficulty handling complex tasks [Reasoning] : denies difficulty with reasoning [Spatial Ability and Orientation] : denies difficulty with spatial ability and orientation [Reports changes in hearing] : Reports no changes in hearing [Reports changes in vision] : Reports no changes in vision [Reports changes in dental health] : Reports no changes in dental health [Guns at Home] : no guns at home [Travel to Developing Areas] : does not  travel to developing areas [TB Exposure] : is not being exposed to tuberculosis [Caregiver Concerns] : does not have caregiver concerns [MammogramDate] : 3/19 [PapSmearDate] : 2/19 [ColonoscopyDate] : 9/21/15 [HepatitisCDate] : 1/18 [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

## 2019-05-29 ENCOUNTER — TRANSCRIPTION ENCOUNTER (OUTPATIENT)
Age: 54
End: 2019-05-29

## 2019-05-29 ENCOUNTER — APPOINTMENT (OUTPATIENT)
Dept: THORACIC SURGERY | Facility: CLINIC | Age: 54
End: 2019-05-29
Payer: COMMERCIAL

## 2019-05-29 VITALS
HEIGHT: 67 IN | HEART RATE: 97 BPM | DIASTOLIC BLOOD PRESSURE: 86 MMHG | TEMPERATURE: 98.7 F | SYSTOLIC BLOOD PRESSURE: 130 MMHG | WEIGHT: 206 LBS | BODY MASS INDEX: 32.33 KG/M2 | OXYGEN SATURATION: 99 %

## 2019-05-29 LAB
25(OH)D3 SERPL-MCNC: 30.8 NG/ML
ALBUMIN SERPL ELPH-MCNC: 4.3 G/DL
ALP BLD-CCNC: 49 U/L
ALT SERPL-CCNC: 18 U/L
ANION GAP SERPL CALC-SCNC: 11 MMOL/L
APPEARANCE: CLEAR
AST SERPL-CCNC: 20 U/L
BILIRUB SERPL-MCNC: 0.2 MG/DL
BILIRUBIN URINE: NEGATIVE
BLOOD URINE: ABNORMAL
BUN SERPL-MCNC: 11 MG/DL
CALCIUM SERPL-MCNC: 9.1 MG/DL
CHLORIDE SERPL-SCNC: 101 MMOL/L
CHOLEST SERPL-MCNC: 168 MG/DL
CHOLEST/HDLC SERPL: 2.8 RATIO
CO2 SERPL-SCNC: 24 MMOL/L
COLOR: NORMAL
CREAT SERPL-MCNC: 0.59 MG/DL
CREAT SPEC-SCNC: 40 MG/DL
FRUCTOSAMINE SERPL-MCNC: 278 UMOL/L
GLUCOSE QUALITATIVE U: NEGATIVE
GLUCOSE SERPL-MCNC: 100 MG/DL
HDLC SERPL-MCNC: 61 MG/DL
KETONES URINE: NEGATIVE
LDLC SERPL CALC-MCNC: 90 MG/DL
LEUKOCYTE ESTERASE URINE: NEGATIVE
MICROALBUMIN 24H UR DL<=1MG/L-MCNC: <1.2 MG/DL
MICROALBUMIN/CREAT 24H UR-RTO: NORMAL MG/G
NITRITE URINE: NEGATIVE
PH URINE: 6.5
POTASSIUM SERPL-SCNC: 4.5 MMOL/L
PROT SERPL-MCNC: 7.3 G/DL
PROTEIN URINE: NEGATIVE
SODIUM SERPL-SCNC: 136 MMOL/L
SPECIFIC GRAVITY URINE: 1.01
THYROGLOB AB SERPL-ACNC: <20 IU/ML
THYROGLOB SERPL-MCNC: <0.2 NG/ML
TRIGL SERPL-MCNC: 87 MG/DL
TSH SERPL-ACNC: 1.76 UIU/ML
UROBILINOGEN URINE: NORMAL

## 2019-05-29 PROCEDURE — 99214 OFFICE O/P EST MOD 30 MIN: CPT

## 2019-06-02 NOTE — ADDENDUM
[FreeTextEntry1] : Remote smoking history - with h/o thyroid cancer. no changes noted on repeat CT scan - given history of thyroid ca and not heavy smoker will repeat in 1 year of ct noncontrast CHEST. \par

## 2019-06-02 NOTE — DATA REVIEWED
[FreeTextEntry1] : EXAM: CT CHEST \par \par \par PROCEDURE DATE: 05/23/2019 \par \par \par INTERPRETATION: EXAMINATION: CT CHEST \par \par CLINICAL INDICATION: Lung nodule. \par \par TECHNIQUE: Noncontrast CT of the chest was obtained. \par \par COMPARISON: 2.7.19. \par \par FINDINGS: \par \par AIRWAYS AND LUNGS: The central tracheobronchial tree is patent. Unchanged \par left lower lobe subpleural 3 mm nodule right middle lobe 3 mm nodule. \par \par MEDIASTINUM AND PLEURA: There are no enlarged mediastinal, hilar or axillary \par lymph nodes. The visualized portion of the thyroid gland is unremarkable. \par There is no pleural effusion. There is no pneumothorax. \par \par HEART AND VESSELS: The heart is normal in size. There are no \par atherosclerotic calcifications of the aorta. There is no pericardial \par effusion. \par \par UPPER ABDOMEN: Images of the upper abdomen demonstrate right hepatic cyst. \par \par BONES AND SOFT TISSUES: The bones are unremarkable. The soft tissues are \par unremarkable. \par \par TUBES/LINES: None. \par \par IMPRESSION: \par No change from prior. One-year follow-up CT recommended if there are risk \par factors for malignancy. \par \par \par GRACY CHACON M.D., ATTENDING RADIOLOGIST \par This document has been electronically signed. May 23 2019 1:45PM \par

## 2019-06-02 NOTE — REVIEW OF SYSTEMS
[Negative] : Heme/Lymph [Shortness Of Breath] : no shortness of breath [Wheezing] : no wheezing [Cough] : no cough [Abdominal Pain] : no abdominal pain

## 2019-06-02 NOTE — HISTORY OF PRESENT ILLNESS
[Diabetes Mellitus] : Diabetes Mellitus [Insulin] : controlled with insulin [FreeTextEntry1] : 53 y.o. female, former smoker (smoked in college, less than PPD), with hx of thyroid CA s/p thyroidectomy and radiation, Type I DM on insulin pump, she was seen by me in February 2019 for evaluation of incidentally found pulmonary nodules.   Plan in February was to repeat CT in 3 months to evaluate stability.  Patient has had that 3 month follow up CT and is here to review the results.  She has no complaints.  Denies SOB, denies dyspnea.

## 2019-06-02 NOTE — PHYSICAL EXAM
[Sclera] : the sclera and conjunctiva were normal [Extraocular Movements] : extraocular movements were intact [Neck Appearance] : the appearance of the neck was normal [Auscultation Breath Sounds / Voice Sounds] : lungs were clear to auscultation bilaterally [] : no respiratory distress [Heart Sounds] : normal S1 and S2 [2+] : left 2+ [Left Fingers] :  capillary refill of the left fingers was delayed [Abdomen Soft] : soft [Cervical Lymph Nodes Enlarged Anterior Bilaterally] : anterior cervical [Abdomen Tenderness] : non-tender [Cervical Lymph Nodes Enlarged Posterior Bilaterally] : posterior cervical [Abnormal Walk] : normal gait [Supraclavicular Lymph Nodes Enlarged Bilaterally] : supraclavicular [No CVA Tenderness] : no ~M costovertebral angle tenderness [Skin Color & Pigmentation] : normal skin color and pigmentation [No Focal Deficits] : no focal deficits [Oriented To Time, Place, And Person] : oriented to person, place, and time [Fingers] :  capillary refill of the fingers was normal

## 2019-06-02 NOTE — ASSESSMENT
[FreeTextEntry1] : 53 year old female here for 3 month follow up for evaluation of incidentally found lung nodules.  Her 3 month follow up CT shows no change.  Plan for follow up in one year with CT without contrast.  RTO after CT next year.\par \par Written by  Keila Camejo PA-C acting as a scribe for Kayce Baptiste MD. The documentation recorded by the scribe accurately reflects the service I personally performed and the decisions made by me, KAYCE BAPTISTE MD.\par

## 2020-01-05 ENCOUNTER — RX RENEWAL (OUTPATIENT)
Age: 55
End: 2020-01-05

## 2020-01-15 ENCOUNTER — APPOINTMENT (OUTPATIENT)
Dept: INTERNAL MEDICINE | Facility: CLINIC | Age: 55
End: 2020-01-15
Payer: COMMERCIAL

## 2020-01-15 VITALS
HEART RATE: 97 BPM | HEIGHT: 67 IN | TEMPERATURE: 96.7 F | WEIGHT: 190 LBS | DIASTOLIC BLOOD PRESSURE: 88 MMHG | BODY MASS INDEX: 29.82 KG/M2 | SYSTOLIC BLOOD PRESSURE: 137 MMHG | OXYGEN SATURATION: 99 %

## 2020-01-15 DIAGNOSIS — R68.89 OTHER GENERAL SYMPTOMS AND SIGNS: ICD-10-CM

## 2020-01-15 PROCEDURE — 99214 OFFICE O/P EST MOD 30 MIN: CPT

## 2020-01-15 RX ORDER — LISINOPRIL 5 MG/1
5 TABLET ORAL DAILY
Qty: 1 | Refills: 0 | Status: DISCONTINUED | COMMUNITY
Start: 2018-05-21 | End: 2020-01-15

## 2020-01-15 NOTE — HEALTH RISK ASSESSMENT
[Intercurrent Urgi Care visits] : went to urgent care [] : No [No] : No [de-identified] : pilates 3 times a week.

## 2020-01-15 NOTE — ASSESSMENT
[FreeTextEntry1] : allergic reaction I am unsure which caused her reaction and could be the combination but will stop lisinopril and she should not take macrobid or Pyridium.  She stopped Pyridium after two days and not cause .  she had reaction after stopping macrobid but ace cause angioedema.    HPN she will be started on amlodipine  .  DM she is being managed by her endocrinologist and I asked her to have results sent to me .  She also has hypothyroid and is being followed by endocrinologist for this.  Uti will repeat  ua to be sure she cleared the infection.   bmi 29 she has lost 16 lbs and is working on losing weight eating healthy and making lifestyle changes and exercise.  She states her hgbA1c is 6.7 %

## 2020-01-15 NOTE — PHYSICAL EXAM
[No Acute Distress] : no acute distress [Well Nourished] : well nourished [Well Developed] : well developed [Well-Appearing] : well-appearing [Normal Sclera/Conjunctiva] : normal sclera/conjunctiva [PERRL] : pupils equal round and reactive to light [Normal Outer Ear/Nose] : the outer ears and nose were normal in appearance [EOMI] : extraocular movements intact [Normal Oropharynx] : the oropharynx was normal [No JVD] : no jugular venous distention [No Lymphadenopathy] : no lymphadenopathy [Supple] : supple [No Respiratory Distress] : no respiratory distress  [No Accessory Muscle Use] : no accessory muscle use [Normal Rate] : normal rate  [Clear to Auscultation] : lungs were clear to auscultation bilaterally [Regular Rhythm] : with a regular rhythm [Normal S1, S2] : normal S1 and S2 [No Edema] : there was no peripheral edema [Pedal Pulses Present] : the pedal pulses are present [Soft] : abdomen soft [No Extremity Clubbing/Cyanosis] : no extremity clubbing/cyanosis [Non Tender] : non-tender [Non-distended] : non-distended [No Masses] : no abdominal mass palpated [No HSM] : no HSM [Normal Bowel Sounds] : normal bowel sounds [Normal Posterior Cervical Nodes] : no posterior cervical lymphadenopathy [Normal Anterior Cervical Nodes] : no anterior cervical lymphadenopathy [No CVA Tenderness] : no CVA  tenderness [No Spinal Tenderness] : no spinal tenderness [No Joint Swelling] : no joint swelling [No Rash] : no rash [Grossly Normal Strength/Tone] : grossly normal strength/tone [Coordination Grossly Intact] : coordination grossly intact [No Focal Deficits] : no focal deficits [Normal Gait] : normal gait [Normal Affect] : the affect was normal [Deep Tendon Reflexes (DTR)] : deep tendon reflexes were 2+ and symmetric [Normal Insight/Judgement] : insight and judgment were intact

## 2020-01-15 NOTE — HISTORY OF PRESENT ILLNESS
[FreeTextEntry1] : allergic reaction [de-identified] : Pt had uti and went to urgent care  and given macrobid which she had taken before and Pyridium .  on 12/27/19 and took till 1/4/20 and on 6th developed a severe reaction and went  to urgent care and told it was angioedema and gave her a shot and was sent to allergies and had evaluation and food allergies . she was given prednisone and no longer have swelling.   She states she had it on eyes and lips and then hives all over and itching on third day . She was already off the medication.   She has been on lisinopril for one year . she had change of generic  2 months ago.  She completed the steroids yesterday and has been off lisinopril .  She no longer has rash.  she didn’t have wheezing or sob.  No choking.

## 2020-01-16 LAB
APPEARANCE: CLEAR
BASOPHILS # BLD AUTO: 0.04 K/UL
BASOPHILS NFR BLD AUTO: 1 %
BILIRUBIN URINE: NEGATIVE
BLOOD URINE: NEGATIVE
COLOR: COLORLESS
EOSINOPHIL # BLD AUTO: 0.13 K/UL
EOSINOPHIL NFR BLD AUTO: 3.2 %
GLUCOSE QUALITATIVE U: NEGATIVE
HCT VFR BLD CALC: 41 %
HGB BLD-MCNC: 12.9 G/DL
IMM GRANULOCYTES NFR BLD AUTO: 0.2 %
KETONES URINE: NEGATIVE
LEUKOCYTE ESTERASE URINE: NEGATIVE
LYMPHOCYTES # BLD AUTO: 1.2 K/UL
LYMPHOCYTES NFR BLD AUTO: 29.1 %
MAN DIFF?: NORMAL
MCHC RBC-ENTMCNC: 29.5 PG
MCHC RBC-ENTMCNC: 31.5 GM/DL
MCV RBC AUTO: 93.6 FL
MONOCYTES # BLD AUTO: 0.35 K/UL
MONOCYTES NFR BLD AUTO: 8.5 %
NEUTROPHILS # BLD AUTO: 2.39 K/UL
NEUTROPHILS NFR BLD AUTO: 58 %
NITRITE URINE: NEGATIVE
PH URINE: 6.5
PLATELET # BLD AUTO: 447 K/UL
PROTEIN URINE: NEGATIVE
RBC # BLD: 4.38 M/UL
RBC # FLD: 13.7 %
SPECIFIC GRAVITY URINE: 1
UROBILINOGEN URINE: NORMAL
WBC # FLD AUTO: 4.12 K/UL

## 2020-01-20 ENCOUNTER — APPOINTMENT (OUTPATIENT)
Dept: ALLERGY | Facility: CLINIC | Age: 55
End: 2020-01-20

## 2020-02-03 ENCOUNTER — APPOINTMENT (OUTPATIENT)
Dept: INTERNAL MEDICINE | Facility: CLINIC | Age: 55
End: 2020-02-03

## 2020-03-25 ENCOUNTER — APPOINTMENT (OUTPATIENT)
Dept: CARDIOLOGY | Facility: CLINIC | Age: 55
End: 2020-03-25

## 2020-04-16 ENCOUNTER — APPOINTMENT (OUTPATIENT)
Dept: CT IMAGING | Facility: IMAGING CENTER | Age: 55
End: 2020-04-16
Payer: COMMERCIAL

## 2020-04-16 ENCOUNTER — OUTPATIENT (OUTPATIENT)
Dept: OUTPATIENT SERVICES | Facility: HOSPITAL | Age: 55
LOS: 1 days | End: 2020-04-16
Payer: COMMERCIAL

## 2020-04-16 DIAGNOSIS — E89.0 POSTPROCEDURAL HYPOTHYROIDISM: Chronic | ICD-10-CM

## 2020-04-16 DIAGNOSIS — R91.1 SOLITARY PULMONARY NODULE: ICD-10-CM

## 2020-04-16 PROCEDURE — 71250 CT THORAX DX C-: CPT | Mod: 26

## 2020-04-16 PROCEDURE — 71250 CT THORAX DX C-: CPT

## 2020-04-21 ENCOUNTER — APPOINTMENT (OUTPATIENT)
Dept: INTERNAL MEDICINE | Facility: CLINIC | Age: 55
End: 2020-04-21

## 2020-04-21 ENCOUNTER — APPOINTMENT (OUTPATIENT)
Dept: INTERNAL MEDICINE | Facility: CLINIC | Age: 55
End: 2020-04-21
Payer: COMMERCIAL

## 2020-04-21 VITALS — RESPIRATION RATE: 13 BRPM | WEIGHT: 182 LBS | OXYGEN SATURATION: 98 % | BODY MASS INDEX: 28.51 KG/M2

## 2020-04-21 DIAGNOSIS — N39.0 URINARY TRACT INFECTION, SITE NOT SPECIFIED: ICD-10-CM

## 2020-04-21 PROCEDURE — 99215 OFFICE O/P EST HI 40 MIN: CPT | Mod: 95

## 2020-04-21 NOTE — PLAN
[FreeTextEntry1] : There is no known specific treatment for COVID-19. Many people will be able to stay home while they get better, but people with serious symptoms or other health problems might need to go to the hospital:\par \par ?Mild illness – Mild illness means you might have symptoms like fever and cough, but you do not have trouble breathing. Most people with COVID-19 have mild illness and can rest at home until they get better. This usually takes about 2 weeks, but it's not the same for everyone.\par \par \par If you are recovering from COVID-19, it's important to stay home and "self-isolate" until your doctor or nurse tells you it's safe to go back to your normal activities. Self-isolation means staying apart from other people, even the people you live with. When you can stop self-isolation will depend on how long it has been since you had symptoms, and in some cases, whether you have had a negative test (showing that the virus is no longer in your body). \par \par \par ?Severe illness – If you have more severe illness with trouble breathing, you might need to stay in the hospital, possibly in the intensive care unit (also called the "ICU"). While you are there, you will most likely be in a special isolation room. Only medical staff will be allowed in the room, and they will have to wear special gowns, gloves, masks, and eye protection.\par \par \par The doctors and nurses can monitor and support your breathing and other body functions and make you as comfortable as possible. You might need extra oxygen to help you breathe easily. If you are having a very hard time breathing, you might need to be put on a ventilator. This is a machine to help you breathe.\par \par \par Doctors are studying several different treatments to learn whether they might work to treat COVID-19. In certain cases, doctors might recommend these treatments or being part of a clinical trial. A clinical trial is a scientific study that tests new medicines to see how well they work. Do not try any new medicines or treatments without talking to a doctor.\par \par \par Can COVID-19 be prevented?\par There is not yet a vaccine to prevent COVID-19. But there are things you can do to reduce your chances of getting it. These steps are a good idea for everyone, especially if you are in an area where the infection is spreading very quickly. But they are extra important for people age 65 years or older or who have other health problems. To help slow the spread of infection:\par \par ?Practice "social distancing." This means keeping people, even those who are healthy, away from each other. It is also sometimes called "physical distancing." The goal is to slow the spread of the virus that causes COVID-19.\par \par \par Avoiding large groups and events is an important part of social distancing. But even small gatherings can be risky, so it's best to stay home as much as you possibly can. When you do need to leave your home (for example, to get food or medicine), try your best to stay at least 6 feet (about 2 meters) away from other people. Some expert groups also recommend covering your face when you need to go out. (See 'What about face masks?' below.)\par \par \par ?Wash your hands with soap and water often. This is especially important after being out in public. Make sure to rub your hands with soap for at least 20 seconds, cleaning your wrists, fingernails, and in between your fingers. Then rinse your hands and dry them with a paper towel you can throw away.\par \par \par If you are not near a sink, you can use a hand sanitizing gel to clean your hands. The gels with at least 60 percent alcohol work the best. But it is better to wash with soap and water if you can.\par \par \par ?Avoid touching your face with your hands, especially your mouth, nose, or eyes.\par \par \par ?Avoid traveling if you can. Some experts recommend not traveling to or from certain areas where COVID-19 is spreading quickly. But any form of travel, especially if you spend time in crowded places like airports, increases your risk. If lots of people travel, it also makes it more likely that the virus will spread to more parts of the world.\par \par \par \par What about face masks?\par When COVID-19 started to spread throughout the world, expert groups in the [place] did not recommend that most people wear a face mask for protection. That's because if healthy people buy a lot of medical masks, there won't be enough for the doctors and nurses who need them. \par \par Washing your hands often and practicing social distancing are still the best ways to protect yourself and others. And experts still do not recommend that people who are not health workers wear a medical mask. But the [place] Centers for Disease Control and Prevention (CDC) does now recommend covering your face when you need to leave your house. This is mostly so that if you are sick, even if you don't have any symptoms, you are less likely to spread the infection to other people. You can use cloth or a bandana to cover your mouth and nose. There are instructions online about how to make your own mask using fabric and rubber bands.\par \par Even if you cover your face, it's still important to stay home except when you need to make necessary trips out, like for food or medicine. And be sure to stay at least 6 feet (2 meters) away from other people when you do leave your home. \par \par When you take your face cover off, make sure you do not touch your eyes, nose, or mouth. And wash your hands after you touch the face cover. You can wash the face cover with the rest of your laundry.\par \par \par What things are NOT recommended to prevent COVID-19?\par There are a lot of opinions about COVID-19, including rumors about how to avoid it. But not all of this information is accurate. For example, you might have heard that you can lower your risk by using a hand dryer, rinsing out your nose with salt water, or taking antibiotics. These things do not work. There is also no evidence that taking vitamins helps.\par \par \par What should I do if someone in my home has COVID-19?\par If someone in your home has COVID-19, there are additional things you can do to protect yourself and others:\par \par ?Keep the sick person away from others – The sick person should stay in a separate room, and use a different bathroom if possible. They should also eat in their own room.\par \par \par Experts also recommend that the person stay away from pets in the house until they are better.\par \par \par ?Have them cover their face – The sick person should cover their nose and mouth with a cloth mask when they are in the same room as other people. If they can't use a face cover, you can help protect yourself by covering your face when you are in the room with them. \par \par \par ?Wash hands – Wash your hands with soap and water often (see above).\par \par \par ?Clean often – Here are some specific things that can help:\par \par \par •Wear disposable gloves when you clean. It's also a good idea to wear gloves when you have to touch the sick person's laundry, dishes, utensils, or trash.\par \par \par •When you do the sick person's laundry, avoid letting dirty clothes or bedding touch your body. Wash your hands and clean the outside of the washer after putting in the laundry.\par \par \par •Regularly clean things that are touched a lot. This includes counters, bedside tables, doorknobs, computers, phones, and bathroom surfaces.\par \par \par •Clean things in your home with soap and water, but also use disinfectants on appropriate surfaces. Some cleaning products work well to kill bacteria, but not viruses, so it's important to check labels. The [place] Environmental Protection Agency (EPA) has a list of products here: www.epa.gov/pesticide-registration/list-n-disinfectants-use-against-sars-cov-2.\par \par \par \par What should I do if there is a COVID-19 outbreak in my area?\par The best thing you can do to stay healthy is to wash your hands often, avoid close contact with people who are sick, and stay home as much as possible (but especially if you are sick). In addition, to help slow the spread of disease, it's important to follow any official instructions in your area about limiting contact with other people. Even if there are no confirmed cases of COVID-19 where you live, that could change in the future.\par \par When a lot of cases of COVID-19 spread through one area, schools or businesses in the area will close temporarily, and many events will be canceled. Leaders will also tell people to stay at home and continue "social distancing" for some time. There are things you can do to prepare for this. For example, you might be able to work from home. You can also make sure you have a way to get in touch with relatives, neighbors, and others in your area. This way you will be able to receive and share information easily.\par \par Rules and guidelines might be different in different areas. If officials tell people in your area to stay home, it's important to take this seriously and follow instructions as best you can. Even if you do not get very sick from COVID-19, you could still pass it along to others who could get very sick. Keeping people away from each other is one of the best ways to control the spread of the virus.\par \par \par What if I feel fine but think I was exposed?\par If you think you were in close contact with someone with COVID-19, but you don't have any symptoms, you should "self-quarantine" at home for at least 14 days. This means staying home as much as possible, and staying least 6 feet (2 meters) away from other people in your home. Self-quarantine is slightly different from self-isolation, which is when a person who is sick stays in a completely separate room from others.\par \par You should also monitor yourself for any symptoms. If you develop a fever, cough, or trouble breathing, call your doctor or nurse right away. \par \par \par What if I am pregnant?\par More information about COVID-19 and pregnancy is available separately. (See "Patient education: Coronavirus disease 2019 (COVID-19) and pregnancy (The Basics)".)\par \par If you are pregnant and you have questions about COVID-19, talk to your doctor, nurse, or midwife. \par \par \par What can I do to cope with stress and anxiety?\par It's normal to feel anxious or worried about COVID-19. You can take care of yourself, and your family, by trying to:\par \par ?Take breaks from the news\par \par ?Get regular exercise and eat healthy foods\par \par ?Try to find activities that you enjoy and can do at home \par \par ?Stay in touch with your friends and family members\par \par \par Keep in mind that most people do not get severely ill from COVID-19. It helps to be prepared, and it's important to do what you can to lower your risk and help slow the spread of the virus. But try not to panic.\par \par \par Where can I go to learn more?\par As we learn more about this virus, expert recommendations will continue to change. Check with your doctor or public health official to get the most updated information about how to protect yourself.\par \par For information about COVID-19 in your area, you can call your local public health office. In the [place], this usually means your city or town's Board of Health. Many states also have a "hotline" phone number you can call.\par \par You can find more information about COVID-19 at the following websites:\par \par ?[place] Centers for Disease Control and Prevention (CDC): www.cdc.gov/COVID19 \par \par ?World Health Organization (WHO): www.who.int/emergencies/diseases/novel-coronavirus-2019\par

## 2020-04-21 NOTE — DATA REVIEWED
[FreeTextEntry1] : reviewed  ct chestPattern of GGO suggests infection including atypical pneumonia/viral \par infection from atypical agents including COVID-19 (C19V-1). \par \par 3-4 mm solid nodules within the right middle lobe (series 4 image 68) and \par left lower lobe (series 4 image 75, 84) are unchanged. \par \par \par \par \par \par

## 2020-04-21 NOTE — HISTORY OF PRESENT ILLNESS
[FreeTextEntry1] : pt called back and has given phone consent to have a video visit As a response to the [place] virus outbreak we are doing our best to keep pts healthy  . In order to do so we would like to  create your fu appt as a telehealth encounter.\par FAce to face video from my office at [address] [place] [address] to her home .  [de-identified] : Pt has been dx with covid who tested on Sunday and told to be positive and was tested via drive throu in [place].  She started being ill March 16 she had headache, body aches and dry throat and on 19th had sob and dry cough and went for testing and told no.  She by 3/31 had chest tightness and then started to feel better but on 4/5 she was worsening and had chest pain, diarrhea and went to urgent care again and told she had gerd and sent to GI  and told she might have gerd and given ppi 40 mg daily.  She called the thoracic  and had chest xray on 4/16 and told she has covid viral pneumonia. and told to go to er if she cant breathe . She has sob on exertion  and went for testing and has positive test. Pattern of GGO suggests infection including atypical pneumonia/viral \par infection from atypical agents including COVID-19 (C19V-1).  She doesn’t have loss of taste or smell.\par \par 3-4 mm solid nodules within the right middle lobe (series 4 image 68) and \par left lower lobe (series 4 image 75, 84) are unchanged. \par \par \par \par \par \par

## 2020-04-21 NOTE — REVIEW OF SYSTEMS
[Fatigue] : fatigue [Sore Throat] : sore throat [Shortness Of Breath] : shortness of breath [Cough] : cough [Negative] : Heme/Lymph [Fever] : no fever [Chills] : no chills [Palpitations] : no palpitations

## 2020-04-21 NOTE — ASSESSMENT
[FreeTextEntry1] : diabetes fbs 140 this am ---The following has been discussed:---\par -Targets for weight and HgA1c have been discussed with patient \par -FS goals have been reviewed with the patient in detail:\par AM <130 post meal<160-180\par -Diet and weight goals have been discussed with the patient in detail.\par -The importance of exercise in the treatment of diabetes has been discussed \par with the patient in detail.\par -Extensive dietary advice provided to patient and the need to avoid concentrated \par sweets/simple carbohydrates and to ensure to consume protein with each meal. \par -Patient instructed to limit carbohydrates to 60 gms per meal and 15 gms per \par snacks. \par -Patient to keep a blood sugar log to check fasting and before meals\par -Patient instructed on importance of daily feet inspection and to reports any \par open lesions to physician promptly\par \par The virus is characterized by obstructing respiratory pathways with thick mucus that solidifies and blocks the airways and lungs. So they have discovered that in order to apply a medicine you have to open and unblock theses airways so that the treatment can be used to take effect however all of this takes a number of days.  Recommendations. to safe guard yourself  1. Drink lots of hot liquids - soups, coffee, teas ,warm water . In addition take a sip of warm water every 20 mins bc this keeps your mouth moist and washes any of the virus that’s entered your moth into your stomach where your gastric juices will neutralizer it before it can get to the lungs . 2. gargle with an antiseptic and warm water like vinegar or salt or lemon every day if possible 3. The virus attaches itself to hair and clothes and detergent or soap kills it but you must take bath or shower when you get in from the street. .Avoid sitting down in your home and go straight to the shower i. If you cant was your clothes daily, hang them in sunlight which also helps to neutralize the virus.  4. wash metallic surfaces very carefully bc the virus can stay viable on these for up to 9 day.s. Take note and be vigilant about touching hand rails, door knobs , etc and keep these clean in home.5. don’t smoke  6. Wash hands every 20 mins with any soap that foams and do this for 20 seconds 7. eat fruits and vegetables . Try to elevate your zinc levels . 8. animals don’t spread the virus to people , its a person to person transmission.  9. Try to avoid getting the common flu as this already weakens your system and try to avoid eating and drinking any cold things.  If her pulse ox decreases to below 90%  let me know or go to er.   10. If you feel any discomfort in your throat or sore throat coming on, follow the above . the virus enters the system though the throat abut  will sit in the throat for 3-4 days before it passes into your lungs.    11. Breath in deeply and hold your breath for 10 seconds . if this can be done without coughing without difficulty , this shows that there is  no inflammation in your lungs , and absence of infection.  It is recommended to do this control every morning to help detect infection. You  are to monitor  temperature daily and monitor for sob, cough and any changes  should call me.  Pts can recover while remaining home. You should avoid using public transportation, ride sharing or taxis.  Separate yourself form other people and animals.  Call ahead before visiting a doctor  .  Wear a face mask.  Cover coughs and sneezes and clean your hands often .\par   dyspepsia - discussed Rule of 2's; pt should avoid eating too much; too fast; too spicy; too lousy; less than two hours before bed \par -Things to avoid including overeating, spicy foods, tight clothing, eating within three hours of bed, this list is not all inclusive. \par -For treatment of reflux, possible options discussed including diet control, H2 blockers, PPIs, as well as coating motility agents discussed as treatment options. Timing of meals and proximity of last meal to sleep were discussed. If symptoms persist, a formal gastrointestinal evaluation is needed. \par \par she will stop ppi since its associate with pneumonia and take famotidine.

## 2020-04-21 NOTE — HEALTH RISK ASSESSMENT
[No] : No [0] : 1) Little interest or pleasure doing things: Not at all (0) [] : No [de-identified] : as able [[id number]] : 0

## 2020-04-21 NOTE — PHYSICAL EXAM
[No Acute Distress] : no acute distress [Normal Voice/Communication] : normal voice/communication [PERRL] : pupils equal round and reactive to light [Normal Oropharynx] : the oropharynx was normal [Supple] : supple [No Edema] : there was no peripheral edema [Non Tender] : non-tender [No Joint Swelling] : no joint swelling [Grossly Normal Strength/Tone] : grossly normal strength/tone [Coordination Grossly Intact] : coordination grossly intact [No Focal Deficits] : no focal deficits [Normal] : affect was normal and insight and judgment were intact [de-identified] : 98% pulse ox  She can hold her breath for 10 seconds.

## 2020-04-26 ENCOUNTER — EMERGENCY (EMERGENCY)
Facility: HOSPITAL | Age: 55
LOS: 1 days | Discharge: HOME | End: 2020-04-26
Attending: EMERGENCY MEDICINE | Admitting: EMERGENCY MEDICINE
Payer: COMMERCIAL

## 2020-04-26 VITALS
SYSTOLIC BLOOD PRESSURE: 131 MMHG | DIASTOLIC BLOOD PRESSURE: 86 MMHG | RESPIRATION RATE: 18 BRPM | OXYGEN SATURATION: 100 % | TEMPERATURE: 99 F | HEART RATE: 91 BPM

## 2020-04-26 VITALS
SYSTOLIC BLOOD PRESSURE: 145 MMHG | HEART RATE: 160 BPM | DIASTOLIC BLOOD PRESSURE: 68 MMHG | RESPIRATION RATE: 18 BRPM | TEMPERATURE: 98 F | OXYGEN SATURATION: 99 %

## 2020-04-26 DIAGNOSIS — E89.0 POSTPROCEDURAL HYPOTHYROIDISM: Chronic | ICD-10-CM

## 2020-04-26 LAB
ALBUMIN SERPL ELPH-MCNC: 4.2 G/DL — SIGNIFICANT CHANGE UP (ref 3.3–5)
ALP SERPL-CCNC: 53 U/L — SIGNIFICANT CHANGE UP (ref 40–120)
ALT FLD-CCNC: 20 U/L — SIGNIFICANT CHANGE UP (ref 4–33)
ANION GAP SERPL CALC-SCNC: 10 MMO/L — SIGNIFICANT CHANGE UP (ref 7–14)
APTT BLD: 32.9 SEC — SIGNIFICANT CHANGE UP (ref 27.5–36.3)
AST SERPL-CCNC: 21 U/L — SIGNIFICANT CHANGE UP (ref 4–32)
BASOPHILS # BLD AUTO: 0.06 K/UL — SIGNIFICANT CHANGE UP (ref 0–0.2)
BASOPHILS NFR BLD AUTO: 1 % — SIGNIFICANT CHANGE UP (ref 0–2)
BILIRUB SERPL-MCNC: 0.3 MG/DL — SIGNIFICANT CHANGE UP (ref 0.2–1.2)
BUN SERPL-MCNC: 11 MG/DL — SIGNIFICANT CHANGE UP (ref 7–23)
CALCIUM SERPL-MCNC: 9.4 MG/DL — SIGNIFICANT CHANGE UP (ref 8.4–10.5)
CHLORIDE SERPL-SCNC: 104 MMOL/L — SIGNIFICANT CHANGE UP (ref 98–107)
CO2 SERPL-SCNC: 25 MMOL/L — SIGNIFICANT CHANGE UP (ref 22–31)
CREAT SERPL-MCNC: 0.53 MG/DL — SIGNIFICANT CHANGE UP (ref 0.5–1.3)
EOSINOPHIL # BLD AUTO: 0.1 K/UL — SIGNIFICANT CHANGE UP (ref 0–0.5)
EOSINOPHIL NFR BLD AUTO: 1.7 % — SIGNIFICANT CHANGE UP (ref 0–6)
GLUCOSE SERPL-MCNC: 173 MG/DL — HIGH (ref 70–99)
HCT VFR BLD CALC: 43.3 % — SIGNIFICANT CHANGE UP (ref 34.5–45)
HGB BLD-MCNC: 13.8 G/DL — SIGNIFICANT CHANGE UP (ref 11.5–15.5)
IMM GRANULOCYTES NFR BLD AUTO: 0.2 % — SIGNIFICANT CHANGE UP (ref 0–1.5)
INR BLD: 0.92 — SIGNIFICANT CHANGE UP (ref 0.88–1.17)
LYMPHOCYTES # BLD AUTO: 1.55 K/UL — SIGNIFICANT CHANGE UP (ref 1–3.3)
LYMPHOCYTES # BLD AUTO: 26.3 % — SIGNIFICANT CHANGE UP (ref 13–44)
MCHC RBC-ENTMCNC: 28.4 PG — SIGNIFICANT CHANGE UP (ref 27–34)
MCHC RBC-ENTMCNC: 31.9 % — LOW (ref 32–36)
MCV RBC AUTO: 89.1 FL — SIGNIFICANT CHANGE UP (ref 80–100)
MONOCYTES # BLD AUTO: 0.53 K/UL — SIGNIFICANT CHANGE UP (ref 0–0.9)
MONOCYTES NFR BLD AUTO: 9 % — SIGNIFICANT CHANGE UP (ref 2–14)
NEUTROPHILS # BLD AUTO: 3.64 K/UL — SIGNIFICANT CHANGE UP (ref 1.8–7.4)
NEUTROPHILS NFR BLD AUTO: 61.8 % — SIGNIFICANT CHANGE UP (ref 43–77)
NRBC # FLD: 0 K/UL — SIGNIFICANT CHANGE UP (ref 0–0)
PLATELET # BLD AUTO: 485 K/UL — HIGH (ref 150–400)
PMV BLD: 9.6 FL — SIGNIFICANT CHANGE UP (ref 7–13)
POTASSIUM SERPL-MCNC: 4.5 MMOL/L — SIGNIFICANT CHANGE UP (ref 3.5–5.3)
POTASSIUM SERPL-SCNC: 4.5 MMOL/L — SIGNIFICANT CHANGE UP (ref 3.5–5.3)
PROT SERPL-MCNC: 7.5 G/DL — SIGNIFICANT CHANGE UP (ref 6–8.3)
PROTHROM AB SERPL-ACNC: 10.5 SEC — SIGNIFICANT CHANGE UP (ref 9.8–13.1)
RBC # BLD: 4.86 M/UL — SIGNIFICANT CHANGE UP (ref 3.8–5.2)
RBC # FLD: 14.1 % — SIGNIFICANT CHANGE UP (ref 10.3–14.5)
SODIUM SERPL-SCNC: 139 MMOL/L — SIGNIFICANT CHANGE UP (ref 135–145)
TROPONIN T, HIGH SENSITIVITY: < 6 NG/L — SIGNIFICANT CHANGE UP (ref ?–14)
TSH SERPL-MCNC: 0.28 UIU/ML — SIGNIFICANT CHANGE UP (ref 0.27–4.2)
WBC # BLD: 5.89 K/UL — SIGNIFICANT CHANGE UP (ref 3.8–10.5)
WBC # FLD AUTO: 5.89 K/UL — SIGNIFICANT CHANGE UP (ref 3.8–10.5)

## 2020-04-26 PROCEDURE — 99291 CRITICAL CARE FIRST HOUR: CPT | Mod: 25

## 2020-04-26 PROCEDURE — 71045 X-RAY EXAM CHEST 1 VIEW: CPT | Mod: 26

## 2020-04-26 PROCEDURE — 92960 CARDIOVERSION ELECTRIC EXT: CPT

## 2020-04-26 PROCEDURE — 99152 MOD SED SAME PHYS/QHP 5/>YRS: CPT

## 2020-04-26 RX ORDER — SODIUM CHLORIDE 9 MG/ML
1000 INJECTION INTRAMUSCULAR; INTRAVENOUS; SUBCUTANEOUS ONCE
Refills: 0 | Status: COMPLETED | OUTPATIENT
Start: 2020-04-26 | End: 2020-04-26

## 2020-04-26 RX ORDER — DILTIAZEM HCL 120 MG
25 CAPSULE, EXT RELEASE 24 HR ORAL ONCE
Refills: 0 | Status: COMPLETED | OUTPATIENT
Start: 2020-04-26 | End: 2020-04-26

## 2020-04-26 RX ORDER — DILTIAZEM HCL 120 MG
20 CAPSULE, EXT RELEASE 24 HR ORAL ONCE
Refills: 0 | Status: COMPLETED | OUTPATIENT
Start: 2020-04-26 | End: 2020-04-26

## 2020-04-26 RX ORDER — APIXABAN 2.5 MG/1
5 TABLET, FILM COATED ORAL ONCE
Refills: 0 | Status: COMPLETED | OUTPATIENT
Start: 2020-04-26 | End: 2020-04-26

## 2020-04-26 RX ORDER — APIXABAN 2.5 MG/1
1 TABLET, FILM COATED ORAL
Qty: 60 | Refills: 0
Start: 2020-04-26 | End: 2020-05-25

## 2020-04-26 RX ADMIN — SODIUM CHLORIDE 1000 MILLILITER(S): 9 INJECTION INTRAMUSCULAR; INTRAVENOUS; SUBCUTANEOUS at 13:46

## 2020-04-26 RX ADMIN — Medication 20 MILLIGRAM(S): at 13:10

## 2020-04-26 RX ADMIN — APIXABAN 5 MILLIGRAM(S): 2.5 TABLET, FILM COATED ORAL at 15:43

## 2020-04-26 NOTE — ED PROVIDER NOTE - PHYSICAL EXAMINATION
Gen: well appearing female, conversant    HEENT: NCAT, EOMI, no nasal discharge, mucous membranes moist  CV: tachycardia no murmurs   Resp: CTAB, no W/R/R  GI: Abdomen soft non-distended, NTTP, no masses  MSK: no open wounds, no LE edema   Neuro: A&Ox4, following commands, moving all four extremities spontaneously  Psych: appropriate mood

## 2020-04-26 NOTE — ED PROVIDER NOTE - PATIENT PORTAL LINK FT
You can access the FollowMyHealth Patient Portal offered by [facility] by registering at the following website: [email or web address]. By joining [facility]’s FollowMyHealth portal, you will also be able to view your health information using other applications (apps) compatible with our system.

## 2020-04-26 NOTE — ED ADULT TRIAGE NOTE - CHIEF COMPLAINT QUOTE
p/t recently diagnosed with Covid 19, c/o of palpitations on and off for few days, sent by PMD for eval, p/t denies any chest pain

## 2020-04-26 NOTE — ED PROCEDURE NOTE - NS_POSTPROCCAREGUIDE_ED_ALL_ED
Patient is now fully awake, with vital signs and temperature stable, hydration is adequate, patients Kelley’s  score is at baseline (or greater than 8), patient and escort has received  discharge education.

## 2020-04-26 NOTE — ED PROVIDER NOTE - PROGRESS NOTE DETAILS
Ford, PGY1 - pt EKG showing afib RVR, mentating well. NS bolus and cardizem 20mg given IV, rate improved to 115-130 within several minutes. pt states palpitations improved. will continue to monitor closely. HR 140s after initial bolus of cardizem. [id number] score 3. discussed second bolus versus cardioversion with attg and pt - explained risks, benefits alternatives of procedure versus rate control, also discussed sedation. plan cardiovert and anticoagulation HR 140s after initial bolus of cardizem. [id number] score 3. discussed second bolus versus cardioversion with attg and pt - explained risks, benefits alternatives of procedure versus rate control, also discussed sedation. plan cardiovert and anticoagulation with eliquis BID Spoke with Dr. Kaur about removing glucometer and insulin pump prior to cardioversion Pt cardioverted no complications with 200J synchronized, etomidate given for sedation. plan dc with eliquis 5mg BID, f/u cardiology Dr. Vishal Jackson  Paged  Dr. Jackson Casey PGY3: No call back from cardiologist, patient instructed to call to follow up with cardiologist as soon as possible (within 1 week). Given one dose of eliquis in ED, and RX sent to pharmacy. DC with return recs. Casey PGY3: Dr. Jackson called back- discussed presentation today with tx and need for follow up, will follow up with patient in office this week, he requests patient call to make appt

## 2020-04-26 NOTE — ED ADULT NURSE REASSESSMENT NOTE - NS ED NURSE REASSESS COMMENT FT1
pt d/c iv removed pt ambulatory instructions given by MD
Pt medicated per MD Ayala's orders, Pt HR decreased and ranges from 120's-150's will continue to monitor

## 2020-04-26 NOTE — ED PROVIDER NOTE - OBJECTIVE STATEMENT
53 y/o F PMH DM, thyroid CA s/p resection, HYPOthyroid presents to the ED with c/o palpitations onset 10pm last night. Pt states her BS has been stable in low 100s throughout the night. Denies syncope, lightheadedness, dizziness. Reports 4 days of chest pain prior to palpitations. No h/o afib or cardiac disease in the past. Pt had cough and body aches onset 5 weeks ago, diagnosed COVID+ 2 weeks by GGO on CT, had +swab 4/19/2020. Denies recent fevers, weight-loss, abd pain, n/v/d, urinary symptoms. 55 y/o F PMH DM, thyroid CA s/p resection, HYPOthyroid presents to the ED with c/o palpitations onset 10pm last night. Pt states her BS has been stable in low 100s throughout the night. Denies syncope, lightheadedness, dizziness. Reports 4 days of chest pain prior to palpitations. No h/o afib or cardiac disease in the past. Pt had cough and body aches onset 5 weeks ago, diagnosed COVID+ 2 weeks by GGO on CT, had +swab 2020. Denies recent fevers, weight-loss, abd pain, n/v/d, urinary symptoms.    Attendinyo female presents with palpitations which began at 10pm last night while she was watching tv/going to bed.  feels slight dizziness when she gets up and walks around.  no fever since 1 week.  has mild cough.  never had this before.

## 2020-04-26 NOTE — ED PROVIDER NOTE - CLINICAL SUMMARY MEDICAL DECISION MAKING FREE TEXT BOX
53 y/o F PMH DM hypothyroid presents with palpitations onset last night 10pm. Recent covid viral illness, symptoms x1 month, diagnosed last week. No syncope, dizziness, assoc CP. EKG showing afib with RVR rate 165-200. pt mentating well, well appearing, mucous membranes moist. DDx new onset afib, afib RVR, hyperglycemia, hyperthyroidism, also considered pneumonia, myocarditis vs pericarditis given recent covid infection. plan labs, tsh, cxr, Cardizem for rate control.

## 2020-04-26 NOTE — ED ADULT NURSE NOTE - OBJECTIVE STATEMENT
54 Y female received to room 22 A&Ox3 complaining of palpitations began at 10 pm last night and has not subsided. Pt is placed on a cardiac monitor and noted to be in rapid afib rhythm with a HR of 210. Pt is able to communicate with no difficulty and no SOB. Pt is ambulatory with no gait disturbances. Pt's lungs are clear bilaterally respirations or equal and unlabored. pt arrived with an insulin pump on her left hip, insertion sight and sensor sight is in her upper left arm, both sights are clean and dry with no signs of skin breakdown. Pt denies any pain, sob, N/V/D, dizziness or weakness. Labs sent, medications given and endocrine on call team was called [phone]. Will continue to monitor. Pt has a pmh of type 1 diabetes, hypothyroidism due to a total thyroidectomy for thyroid cancer.

## 2020-04-26 NOTE — ED ADULT NURSE NOTE - NS TRANSFER PATIENT BELONGINGS
Clothing

## 2020-04-26 NOTE — ED PROVIDER NOTE - NSFOLLOWUPINSTRUCTIONS_ED_ALL_ED_FT
Take Eliquis 5mg two times per day by mouth for the next 21 days. Take your first dose tomorrow morning. Your first dose was in the ED on 4/26/2020.     Follow-up with your Cardiologist for an appointment in the next week for recheck.     Return immediately to the ED for any further palpitations, chest pain, difficulty breathing, or any new concerning symptoms.

## 2020-04-27 ENCOUNTER — EMERGENCY (EMERGENCY)
Facility: HOSPITAL | Age: 55
LOS: 1 days | Discharge: HOME | End: 2020-04-27
Attending: STUDENT IN AN ORGANIZED HEALTH CARE EDUCATION/TRAINING PROGRAM | Admitting: STUDENT IN AN ORGANIZED HEALTH CARE EDUCATION/TRAINING PROGRAM
Payer: COMMERCIAL

## 2020-04-27 ENCOUNTER — APPOINTMENT (OUTPATIENT)
Dept: INTERNAL MEDICINE | Facility: CLINIC | Age: 55
End: 2020-04-27
Payer: COMMERCIAL

## 2020-04-27 VITALS
HEIGHT: 67 IN | HEART RATE: 107 BPM | DIASTOLIC BLOOD PRESSURE: 95 MMHG | OXYGEN SATURATION: 100 % | TEMPERATURE: 98 F | RESPIRATION RATE: 20 BRPM | SYSTOLIC BLOOD PRESSURE: 139 MMHG

## 2020-04-27 VITALS
RESPIRATION RATE: 21 BRPM | DIASTOLIC BLOOD PRESSURE: 101 MMHG | OXYGEN SATURATION: 93 % | HEART RATE: 100 BPM | SYSTOLIC BLOOD PRESSURE: 146 MMHG

## 2020-04-27 DIAGNOSIS — E89.0 POSTPROCEDURAL HYPOTHYROIDISM: Chronic | ICD-10-CM

## 2020-04-27 LAB
ALBUMIN SERPL ELPH-MCNC: 4.1 G/DL — SIGNIFICANT CHANGE UP (ref 3.3–5)
ALP SERPL-CCNC: 48 U/L — SIGNIFICANT CHANGE UP (ref 40–120)
ALT FLD-CCNC: 18 U/L — SIGNIFICANT CHANGE UP (ref 4–33)
ANION GAP SERPL CALC-SCNC: 10 MMO/L — SIGNIFICANT CHANGE UP (ref 7–14)
AST SERPL-CCNC: 16 U/L — SIGNIFICANT CHANGE UP (ref 4–32)
BASOPHILS # BLD AUTO: 0.04 K/UL — SIGNIFICANT CHANGE UP (ref 0–0.2)
BASOPHILS NFR BLD AUTO: 1.1 % — SIGNIFICANT CHANGE UP (ref 0–2)
BILIRUB SERPL-MCNC: 0.4 MG/DL — SIGNIFICANT CHANGE UP (ref 0.2–1.2)
BUN SERPL-MCNC: 10 MG/DL — SIGNIFICANT CHANGE UP (ref 7–23)
CALCIUM SERPL-MCNC: 9 MG/DL — SIGNIFICANT CHANGE UP (ref 8.4–10.5)
CHLORIDE SERPL-SCNC: 102 MMOL/L — SIGNIFICANT CHANGE UP (ref 98–107)
CO2 SERPL-SCNC: 27 MMOL/L — SIGNIFICANT CHANGE UP (ref 22–31)
CREAT SERPL-MCNC: 0.52 MG/DL — SIGNIFICANT CHANGE UP (ref 0.5–1.3)
EOSINOPHIL # BLD AUTO: 0.07 K/UL — SIGNIFICANT CHANGE UP (ref 0–0.5)
EOSINOPHIL NFR BLD AUTO: 2 % — SIGNIFICANT CHANGE UP (ref 0–6)
GLUCOSE SERPL-MCNC: 193 MG/DL — HIGH (ref 70–99)
HCT VFR BLD CALC: 40.1 % — SIGNIFICANT CHANGE UP (ref 34.5–45)
HGB BLD-MCNC: 12.8 G/DL — SIGNIFICANT CHANGE UP (ref 11.5–15.5)
IMM GRANULOCYTES NFR BLD AUTO: 0.3 % — SIGNIFICANT CHANGE UP (ref 0–1.5)
LYMPHOCYTES # BLD AUTO: 0.93 K/UL — LOW (ref 1–3.3)
LYMPHOCYTES # BLD AUTO: 26.3 % — SIGNIFICANT CHANGE UP (ref 13–44)
MCHC RBC-ENTMCNC: 28.4 PG — SIGNIFICANT CHANGE UP (ref 27–34)
MCHC RBC-ENTMCNC: 31.9 % — LOW (ref 32–36)
MCV RBC AUTO: 89.1 FL — SIGNIFICANT CHANGE UP (ref 80–100)
MONOCYTES # BLD AUTO: 0.32 K/UL — SIGNIFICANT CHANGE UP (ref 0–0.9)
MONOCYTES NFR BLD AUTO: 9.1 % — SIGNIFICANT CHANGE UP (ref 2–14)
NEUTROPHILS # BLD AUTO: 2.16 K/UL — SIGNIFICANT CHANGE UP (ref 1.8–7.4)
NEUTROPHILS NFR BLD AUTO: 61.2 % — SIGNIFICANT CHANGE UP (ref 43–77)
NRBC # FLD: 0 K/UL — SIGNIFICANT CHANGE UP (ref 0–0)
PLATELET # BLD AUTO: 394 K/UL — SIGNIFICANT CHANGE UP (ref 150–400)
PMV BLD: 9.7 FL — SIGNIFICANT CHANGE UP (ref 7–13)
POTASSIUM SERPL-MCNC: 4 MMOL/L — SIGNIFICANT CHANGE UP (ref 3.5–5.3)
POTASSIUM SERPL-SCNC: 4 MMOL/L — SIGNIFICANT CHANGE UP (ref 3.5–5.3)
PROT SERPL-MCNC: 7.3 G/DL — SIGNIFICANT CHANGE UP (ref 6–8.3)
RBC # BLD: 4.5 M/UL — SIGNIFICANT CHANGE UP (ref 3.8–5.2)
RBC # FLD: 13.9 % — SIGNIFICANT CHANGE UP (ref 10.3–14.5)
SODIUM SERPL-SCNC: 139 MMOL/L — SIGNIFICANT CHANGE UP (ref 135–145)
TROPONIN T, HIGH SENSITIVITY: < 6 NG/L — SIGNIFICANT CHANGE UP (ref ?–14)
TROPONIN T, HIGH SENSITIVITY: < 6 NG/L — SIGNIFICANT CHANGE UP (ref ?–14)
WBC # BLD: 3.53 K/UL — LOW (ref 3.8–10.5)
WBC # FLD AUTO: 3.53 K/UL — LOW (ref 3.8–10.5)

## 2020-04-27 PROCEDURE — 71045 X-RAY EXAM CHEST 1 VIEW: CPT | Mod: 26

## 2020-04-27 PROCEDURE — 93010 ELECTROCARDIOGRAM REPORT: CPT

## 2020-04-27 PROCEDURE — 99284 EMERGENCY DEPT VISIT MOD MDM: CPT | Mod: 25

## 2020-04-27 PROCEDURE — 93306 TTE W/DOPPLER COMPLETE: CPT | Mod: 26

## 2020-04-27 PROCEDURE — 99442: CPT

## 2020-04-27 NOTE — ED PROVIDER NOTE - CARE PLAN
Principal Discharge DX:	Chest tightness  Secondary Diagnosis:	Pleuritic chest pain  Secondary Diagnosis:	COVID-19 virus infection

## 2020-04-27 NOTE — ED PROVIDER NOTE - OBJECTIVE STATEMENT
55yo F Hx thyroid CA s/p thyroidectomy and DM type 1 p/w complaints of chest tightness. L sided chest tightness since the beginning of march, with SOB. had 1 week N/V/D in march. Had CT scan highly suspcious of covid-19 and positive swab 4/19. states that fever, cough and N/V/D has since resolved. her SOB has been slowly improving but the chest tightness persists. worse with leaning forward and exertion. continued SOB with exertion but feels like its improving. was seen yesterday for palpitations, found to have new onset afib and was cardioverted. dc'd on xeralto and cardiology follow up. called cardiologist who said that they can do a televisit since she is covid+. Patients internist was stated that she needed to have an echo performed to make sure there wasn't a structural component to the a fib and that she needed to return to the ED for an echo. no current palpitations.

## 2020-04-27 NOTE — ED PROVIDER NOTE - PATIENT PORTAL LINK FT
You can access the FollowMyHealth Patient Portal offered by [facility] by registering at the following website: [email or web address]. By joining [employer]’s FollowMyHealth portal, you will also be able to view your health information using other applications (apps) compatible with our system.

## 2020-04-27 NOTE — ED CLERICAL - NS ED CLERK NOTE PRE-ARRIVAL INFORMATION; ADDITIONAL PRE-ARRIVAL INFORMATION
This patient is enrolled in the Transitional Care Management COVID 19 program and has active care navigation. This patient can be followed up by the care navigation team within 24 hours. To arrange close follow-up or to obtain additional clinical information about this patient, please call the contact number above.

## 2020-04-27 NOTE — ED ADULT NURSE NOTE - OBJECTIVE STATEMENT
Pt reports she is here for chest pain. Was seen here yesterday with chest pain and was cardioverted for atrial fibrillation.

## 2020-04-27 NOTE — ED PROCEDURE NOTE - PROCEDURE ADDITIONAL DETAILS
[id number], Ultrasound, limited, cardiac  Focused ED cardiac ultrasound:  At least 2 cardiac views obtained.   Indication: chest pain  Findings:  no significant pericardial effusion  normal cardiac contractility  No right ventricular enlargement or dysfunction noted    Impression: No evidence of a significant pericardial effusion, significant systolic dysfunction or right heart strain

## 2020-04-27 NOTE — ED PROVIDER NOTE - GASTROINTESTINAL, MLM
Abdomen soft, non-tender, no guarding. unlabored breathing, speaking in full sentences, maintaining O2 saturation on RA.

## 2020-04-27 NOTE — ED ADULT TRIAGE NOTE - CHIEF COMPLAINT QUOTE
pt. dx w/ COVID 19 on 4/19, seen at [facility] yesterday for Afadeel yang'd on Eliquis, returns w/ reports of worsening chest pain w/ radiation down the L arm and shortness of breath on exertion. PMHx DM, thyroidectomy.

## 2020-04-27 NOTE — ED PROVIDER NOTE - EKG #1 DATE/TIME
Patient stated that she has a cough which sounds like is causing some pleuritic pain. Attempted to schedule appointment, however no appointment. Transferred to central scheduling    Reason for Disposition   SEVERE coughing spells (e.g., whooping sound after coughing, vomiting after coughing)    Protocols used: ST COUGH-A-OH      
27-Apr-2020 11:37

## 2020-04-27 NOTE — ED ADULT NURSE NOTE - CHIEF COMPLAINT QUOTE
pt. dx w/ COVID 19 on 4/19, seen at [facility] yesterday for Afadeel yang'd on Eliquis, returns w/ reports of worsening chest pain w/ radiation down the L arm and shortness of breath on exertion. PMHx DM, thyroidectomy.

## 2020-04-27 NOTE — ED PROVIDER NOTE - CLINICAL SUMMARY MEDICAL DECISION MAKING FREE TEXT BOX
50yo F hx thyroid CA s/p thyroidectomy and type 1 DM presenting with complaints of chest tightness persistent for 1 month. +covid-19 by CT and swab 4/19. new onset afib yesterday s/p cardioversion, neg trop. now on xeralto. unable to see cardiologist in person due to covid status and internist concerned wanting echo to r/o structural heart disease. will repeat ekg and labs including troponin. ED bedside echo and will try to arrange for a comprehensive echo in the ED. likely discharge with cardiology follow up. Joaquin Varner DO: Patient seen during COVID19 pandemic. 50 yo F hx thyroid CA s/p thyroidectomy and type 1 DM, scene in ED yesterday for new afib with RVR. patient was DC cardioverted and dc home with DOAC. Has Cardiology tele appt scheduled this wednesday. patient mentioned to PCP that her chest pain/ chest tightness is still presenting after which PCP recommended to return to ED for further evaluation. I spoke with PCP, [place], who expressed concern and requested echo given persistence of cp and new afib. pt' reports cp present > 1 month has been improving but worse with exertion and leaning forward. patient overall reports feeling better since COIVD dx. Exam without significant abnormality. POCUS without significant abnormality. labs and  ekg and cardiology echo without significant abnormality.  poss persistent symptoms from covid infection. low suspicion for primary cardia pathology, PE at this time. Return precautions were discussed with patient at bedside and patient expressed understanding. stable for dc with cardiology follow up in next 48 hours as scheduled.

## 2020-04-27 NOTE — ED PROVIDER NOTE - NSFOLLOWUPINSTRUCTIONS_ED_ALL_ED_FT
you were seen in the ED today for chest tightness.     you had chest x-ray and labs performed as well as an echocardiogram which didn't find any emergent findings.     please continue to take prescribed medications and meet with your scheduled appt with your cardiologist.     Return to the emergency department if you develop any new or worsening symptoms.

## 2020-04-28 ENCOUNTER — APPOINTMENT (OUTPATIENT)
Dept: CARDIOLOGY | Facility: CLINIC | Age: 55
End: 2020-04-28
Payer: COMMERCIAL

## 2020-04-28 PROCEDURE — 99213 OFFICE O/P EST LOW 20 MIN: CPT | Mod: 95

## 2020-04-28 NOTE — HISTORY OF PRESENT ILLNESS
[FreeTextEntry1] : 53 yo F with HTN, HLD, T1DM, and thyroid CA s/p resection and PEDRO treatment, who presents for follow-up telemedicine visit after ~1.5 years. She had palpitations in 12/2017 for which she underwent work-up including echocardiogram and nuclear stress test, showing preserved EF and no evidence of ischemia. She was drinking 8-10 cups of coffee daily, and when she decreased intake to 2-3 cups her palpitations disappeared. \par \par Patient  reports that she has been diagnosed with COVID-19 earlier in April, and then on April 26 she went to the emergency room at [facility] due to palpitations. She was noted to be in A. fib with RVR in the 160s and higher, for which she ultimately underwent electrical cardioversion with resumption of sinus rhythm. Patient was started on Eliquis and discharged home. An echocardiogram 4/27 showed preserved EF and was otherwise unremarkable. Patient reports she has had no further episodes of palpitations since her cardioversion. Denies chest pain or dyspnea, no lower extremity edema. Compliant with Eliquis, has no bleeding episodes, though the patient is concerned that as a diabetic who has a insulin pump and sensor she will need to frequently penetrate her skin while on the anticoagulants.\par

## 2020-04-28 NOTE — REASON FOR VISIT
[Home] : at home, [unfilled] , at the time of the visit. [Medical Office: ([facility])___] : at the medical office located in  [Patient] : the patient [Follow-Up - From Hospitalization] : follow-up of a recent hospitalization for [Atrial Fibrillation] : atrial fibrillation

## 2020-04-28 NOTE — ASSESSMENT
[FreeTextEntry1] : 53 yo F with DM and HTN who presents for follow-up telemedicine visit after recentdiagnosis of atrial fibrillation. \par \par 1. A fib: -Patient's CHADS2-VASc score is 3 (HTN, diabetes, female ), corresponding to ~3% annual risk of stroke if off systemic anticoagulation.\par -We discussed r/b/a of Eliquis; Rx renewed electronically\par -No role for antiarrhythmics yet as this was first episode; we discussed spectrum of treatments for A fib ranging from observation to medications to ablation\par \par 2. HTN: appears well controlled on amlodipine. \par -Unable to tolerate lisinopril\par \par 3. HLD: Patient's 10-year risk of atherosclerotic cardiovascular disease (ASCVD) as estimated by the pooled cohort equations is 1.5%. While this does not usually need a statin, the presence of diabetes has a class IA indication for moderate intensity statin (i.e. atorvastatin 10mg PO QHS). Patient states she will discuss this with her PMD.\par \par Patient will follow up with me in one month.

## 2020-04-30 RX ORDER — APIXABAN 5 MG/1
5 TABLET, FILM COATED ORAL
Qty: 180 | Refills: 3 | Status: DISCONTINUED | COMMUNITY
Start: 2020-04-26 | End: 2020-04-30

## 2020-05-01 ENCOUNTER — APPOINTMENT (OUTPATIENT)
Dept: ELECTROPHYSIOLOGY | Facility: CLINIC | Age: 55
End: 2020-05-01
Payer: COMMERCIAL

## 2020-05-01 PROCEDURE — 99214 OFFICE O/P EST MOD 30 MIN: CPT | Mod: 95

## 2020-05-01 NOTE — REASON FOR VISIT
[Home] : at home, [unfilled] , at the time of the visit. [Initial Evaluation] : an initial evaluation of [Medical Office: ([facility])___] : at the medical office located in  [Atrial Fibrillation] : atrial fibrillation

## 2020-05-14 ENCOUNTER — APPOINTMENT (OUTPATIENT)
Dept: INTERNAL MEDICINE | Facility: CLINIC | Age: 55
End: 2020-05-14
Payer: COMMERCIAL

## 2020-05-14 PROCEDURE — 99442: CPT

## 2020-05-14 RX ORDER — PANTOPRAZOLE 40 MG/1
40 TABLET, DELAYED RELEASE ORAL
Qty: 30 | Refills: 0 | Status: COMPLETED | COMMUNITY
Start: 2020-04-14 | End: 2020-05-14

## 2020-05-14 RX ORDER — RIVAROXABAN 20 MG/1
20 TABLET, FILM COATED ORAL DAILY
Qty: 30 | Refills: 3 | Status: COMPLETED | COMMUNITY
Start: 2020-04-30 | End: 2020-05-14

## 2020-05-14 RX ORDER — FAMOTIDINE 20 MG/1
20 TABLET, FILM COATED ORAL
Qty: 60 | Refills: 3 | Status: COMPLETED | COMMUNITY
Start: 2020-04-21 | End: 2020-05-14

## 2020-05-14 NOTE — PHYSICAL EXAM
[General Appearance - Well Developed] : well developed [Well Groomed] : well groomed [Normal Appearance] : normal appearance [No Deformities] : no deformities [General Appearance - In No Acute Distress] : no acute distress [General Appearance - Well Nourished] : well nourished [FreeTextEntry1] : Visit performed via TELE HEALTH, unable to complete physical exam

## 2020-05-14 NOTE — HISTORY OF PRESENT ILLNESS
[FreeTextEntry1] : Bradley Jackson MD\par \par Janae Douglas is a 53y/o woman with Hx of HTN, asthma, GERD, hypothyroid (thyroid ca s/p resection), all of which are stable, DMI, and recurring palpitations and paroxysmal afib who presents today for initial evaluation. Was diagnosed with COVID-19 in early April and was seen in the ER for palpitations and noted  to be in afib with RVR. Underwent DCCV. Has now been maintained on Xarelto for thromboembolic prophylaxis without bleeding and no recurrence of afib from her knowledge. Denies chest pain, palpitations, SOB, syncope or near syncope.\par

## 2020-05-14 NOTE — DISCUSSION/SUMMARY
[FreeTextEntry1] : Janae Douglas is a 55y/o woman with Hx of HTN, asthma, GERD, hypothyroid (thyroid ca s/p resection), all of which are stable, DMI, and recurring palpitations and paroxysmal afib who presents today for initial evaluation.\par \par Impression:\par \par 1. Paroxysmal afib: No EKG performed today. Newly diagnosed afib with RVR in setting of COVID s/p DCCV. No recent palpitations. Remains on Xarelto for thromboembolic prophylaxis. Consider ILR placement for long term monitoring of suspected recurrent afib. Risks, benefits, and alternatives discussed. \par \par 2. HTN: resume oral antihypertensives as prescribed. Encouraged heart healthy diet, sodium restriction, and weight loss. Continue regular f/u with Cardiologist for further HTN management.\par \par 3. Hypothyroid: resume levothyroxine as prescribed and regular f/u with PCP for routine TFT monitoring and management.\par \par A total of 20 minutes spent during tele visit. Will plan for ILR placement.

## 2020-05-28 ENCOUNTER — APPOINTMENT (OUTPATIENT)
Dept: THORACIC SURGERY | Facility: CLINIC | Age: 55
End: 2020-05-28
Payer: COMMERCIAL

## 2020-05-28 PROCEDURE — 99214 OFFICE O/P EST MOD 30 MIN: CPT

## 2020-05-28 NOTE — REASON FOR VISIT
[Verbal consent obtained from patient] : the patient, [unfilled] [Follow-Up: _____] : a [unfilled] follow-up visit [FreeTextEntry4] : Angie Hanley, NP

## 2020-05-28 NOTE — ASSESSMENT
[FreeTextEntry1] : 54 y.o. female, former smoker (in college), with hx of thyroid CA s/p thyroidectomy and radiation, Type I DM on insulin pump, who recently presented to ED for abdominal pain. In the ED, CT abd was done, found to have nodule in LLL. Pt then went to see Pulmonologist and was sent for a CT chest. \par \par CT chest on 2/7/19:\par - 3 mm noncalcified nodule in RML\par - 4 mm nodule in LLL\par \par 3 month f/u:\par \par CT chest 5/23/2019:\par -Unchanged left lower lobe subpleural 3 mm nodule right middle lobe 3 mm nodule. \par \par One year f/u: \par \par CT chest scan 4/16/2020:\par -3-4 mm solid nodules RML an LLL are unchanged. \par -Pattern of GGO suggests infection including atypical pneumonia/viral infection from atypical agents including COVID-19\par \par Of note telephone conversation 4/16/2020, CT scan for follow up nodules demonstrated bilateral ground glass opacities consistent with COVID 19 . spoke to patient regarding symptoms - fevers, SOB, cough. she stated she was sick approx one month prior - in last two weeks has been close to baseline, experiencing no fevers/chills, SOB or cough. explained the findings on CT regarding COVID 19 - instructed patient to monitor for symptoms and if persistent fevers and sig SOB would need to go to ER. Exercise caution with hand washing and face masks. \par \par Reports very minimal shortness of breath with exertion Denies fever, chills, cough, or hemoptysis. \par \par I have reviewed the images with the patient and made the following recommendations. I have recommended the patient follow up in two years with a CT scan of the chest.\par \par Written by Angie Hanley NP, acting as a scribe for Dr. Jessica Baptiste.\par The documentation recorded by the scribe accurately reflects the service I personally performed and the decisions made by me. Jessica Baptiste MD \par

## 2020-05-28 NOTE — HISTORY OF PRESENT ILLNESS
[FreeTextEntry1] : 54 y.o. female, former smoker (in college), with hx of thyroid CA s/p thyroidectomy and radiation, Type I DM on insulin pump, who recently presented to ED for abdominal pain. In the ED, CT abd was done, found to have nodule in LLL. Pt then went to see Pulmonologist and was sent for a CT chest. \par \par CT chest on 2/7/19:\par - 3 mm noncalcified nodule in RML\par - 4 mm nodule in LLL\par \par 3 month f/u:\par \par CT chest 5/23/2019:\par -Unchanged left lower lobe subpleural 3 mm nodule right middle lobe 3 mm nodule. \par \par One year f/u: \par \par CT chest scan 4/16/2020:\par -3-4 mm solid nodules RML an LLL are unchanged. \par -Pattern of GGO suggests infection including atypical pneumonia/viral infection from atypical agents including COVID-19\par \par Of note telephone conversation 4/16/2020, CT scan for follow up nodules demonstrated bilateral ground glass opacities consistent with COVID 19 . spoke to patient regarding symptoms - fevers, SOB, cough. she stated she was sick approx one month prior - in last two weeks has been close to baseline, experiencing no fevers/chills, SOB or cough. explained the findings on CT regarding COVID 19 - instructed patient to monitor for symptoms and if persistent fevers and sig SOB would need to go to ER. Exercise caution with hand washing and face masks. \par \par Reports very minimal shortness of breath with exertion Denies fever, chills, cough, or hemoptysis.

## 2020-06-03 ENCOUNTER — APPOINTMENT (OUTPATIENT)
Dept: CARDIOLOGY | Facility: CLINIC | Age: 55
End: 2020-06-03
Payer: COMMERCIAL

## 2020-06-03 ENCOUNTER — APPOINTMENT (OUTPATIENT)
Dept: INTERNAL MEDICINE | Facility: CLINIC | Age: 55
End: 2020-06-03
Payer: COMMERCIAL

## 2020-06-03 VITALS
BODY MASS INDEX: 29.35 KG/M2 | WEIGHT: 187 LBS | HEART RATE: 102 BPM | DIASTOLIC BLOOD PRESSURE: 79 MMHG | OXYGEN SATURATION: 99 % | SYSTOLIC BLOOD PRESSURE: 132 MMHG | TEMPERATURE: 97.5 F | HEIGHT: 67 IN

## 2020-06-03 PROCEDURE — 99214 OFFICE O/P EST MOD 30 MIN: CPT | Mod: 25

## 2020-06-03 PROCEDURE — 99396 PREV VISIT EST AGE 40-64: CPT | Mod: 25

## 2020-06-03 PROCEDURE — 93000 ELECTROCARDIOGRAM COMPLETE: CPT

## 2020-06-03 PROCEDURE — 99214 OFFICE O/P EST MOD 30 MIN: CPT

## 2020-06-03 RX ORDER — AMLODIPINE BESYLATE 2.5 MG/1
2.5 TABLET ORAL
Qty: 30 | Refills: 0 | Status: DISCONTINUED | COMMUNITY
Start: 2020-04-18 | End: 2020-06-03

## 2020-06-03 NOTE — COUNSELING
[Fall prevention counseling provided] : Fall prevention counseling provided [Adequate lighting] : Adequate lighting [No throw rugs] : No throw rugs [Use proper foot wear] : Use proper foot wear [Use recommended devices] : Use recommended devices [Sleep ___ hours/day] : Sleep [unfilled] hours/day [Engage in a relaxing activity] : Engage in a relaxing activity [Plan in advance] : Plan in advance [Potential consequences of obesity discussed] : Potential consequences of obesity discussed [Benefits of weight loss discussed] : Benefits of weight loss discussed [Structured Weight Management Program suggested:] : Structured weight management program suggested [Encouraged to maintain food diary] : Encouraged to maintain food diary [Encouraged to increase physical activity] : Encouraged to increase physical activity [Encouraged to use exercise tracking device] : Encouraged to use exercise tracking device [Target Wt Loss Goal ___] : Weight Loss Goals: Target weight loss goal [unfilled] lbs [Weigh Self Weekly] : weigh self weekly [Decrease Portions] : decrease portions [____ min/wk Activity] : [unfilled] min/wk activity [Keep Food Diary] : keep food diary [FreeTextEntry1] : low calorie

## 2020-06-03 NOTE — ASSESSMENT
[FreeTextEntry1] : 55 yo F with DM, HTN, COVID-19 infection c/b atrial fibrillation who presents for follow-up visit. \par \par 1. A fib: -Patient's CHADS2-VASc score is 3 (HTN, diabetes, female ), corresponding to ~3% annual risk of stroke if off systemic anticoagulation.\par -On Eliquis, no bleeding issues\par -Followed by EP, pending ILR placement\par \par 2. HTN: appears suboptimally controlled on amlodipine. \par -As a diabetic,patient should be on an ACEi or ARB. Given her possible adverse reaction to lisinopril, will try her on low-dose losartan 25 mg once daily. Patient counseled on warning signs of angioedema including tongue or lip swelling or dyspnea, with instructions to immediately call EMS she should develop these symptoms. We discussed the lower but non-zero risk of cross allergy/angioedema between ACEi and ARBs.\par \par 3. HLD: Patient's 10-year risk of atherosclerotic cardiovascular disease (ASCVD) as estimated by the pooled cohort equations is 2.9%. She is agreeable with initiation of atorvastatin for ASCVD risk prevention. R/B/A discussed.\par \par Patient will follow up with me in three months.

## 2020-06-03 NOTE — HEALTH RISK ASSESSMENT
[Very Good] : ~his/her~  mood as very good [No] : No [No falls in past year] : Patient reported no falls in the past year [0] : 2) Feeling down, depressed, or hopeless: Not at all (0) [Patient reported mammogram was normal] : Patient reported mammogram was normal [Patient reported PAP Smear was normal] : Patient reported PAP Smear was normal [Patient reported colonoscopy was normal] : Patient reported colonoscopy was normal [HIV test declined] : HIV test declined [Hepatitis C test offered] : Hepatitis C test offered [None] : None [With Family] : lives with family [# of Members in Household ___] :  household currently consist of [unfilled] member(s) [Unemployed] : unemployed [Graduate School] : graduate school [] :  [# Of Children ___] : has [unfilled] children [Sexually Active] : sexually active [Fully functional (bathing, dressing, toileting, transferring, walking, feeding)] : Fully functional (bathing, dressing, toileting, transferring, walking, feeding) [Fully functional (using the telephone, shopping, preparing meals, housekeeping, doing laundry, using] : Fully functional and needs no help or supervision to perform IADLs (using the telephone, shopping, preparing meals, housekeeping, doing laundry, using transportation, managing medications and managing finances) [Smoke Detector] : smoke detector [Carbon Monoxide Detector] : carbon monoxide detector [Safety elements used in home] : safety elements used in home [Seat Belt] :  uses seat belt [Sunscreen] : uses sunscreen [Aggressive treatment] : aggressive treatment [FreeTextEntry1] : covid and atrial fibrillation  [] : No [de-identified] : pilates 3-4  times week [de-identified] : Dr Jackson cardiologist, Dr Elver Baptiste  Thoracic  [[id number]] : 0 [de-identified] : regular  [Change in mental status noted] : No change in mental status noted [Language] : denies difficulty with language [Behavior] : denies difficulty with behavior [Learning/Retaining New Information] : denies difficulty learning/retaining new information [Reasoning] : denies difficulty with reasoning [Handling Complex Tasks] : denies difficulty handling complex tasks [Spatial Ability and Orientation] : denies difficulty with spatial ability and orientation [Reports changes in hearing] : Reports no changes in hearing [Reports changes in vision] : Reports no changes in vision [Reports changes in dental health] : Reports no changes in dental health [Guns at Home] : no guns at home [Travel to Developing Areas] : does not  travel to developing areas [TB Exposure] : is not being exposed to tuberculosis [Caregiver Concerns] : does not have caregiver concerns [PapSmearDate] : 2018 [MammogramDate] : 2018  [ColonoscopyDate] : 2015 [HepatitisCDate] : 2018 [FreeTextEntry2] :  [de-identified] : last eye exam 2/20 [de-identified] : last dental  1 yr [AdvancecareDate] : 6/20

## 2020-06-03 NOTE — PAST MEDICAL HISTORY
[Menstruating] : menstruating [Menarche Age ____] : age at menarche was [unfilled] [Definite ___ (Date)] : the last menstrual period was [unfilled] [Total Preg ___] : G[unfilled] [Live Births ___] : P[unfilled]  [Full Term ___] : Full Term: [unfilled] [Living ___] : Living: [unfilled] [AB Induced ___] : elective abortions: [unfilled]  [AB Spont ___] : miscarriages: [unfilled]

## 2020-06-03 NOTE — PHYSICAL EXAM
[Well Developed] : well developed [Well Nourished] : well nourished [Normal Voice Quality] : was normal [Normal Verbal Skills] : the patient had normal verbal communication skills [Normal Nonverbal Skills] : normal nonverbal communication skills were demonstrated [Conjunctiva] : the conjunctiva were normal in both eyes [PERRL] : pupils were equal in size, round, and reactive to light [EOM Intact] : extraocular movements were intact [Neck Supple] : was supple [Normal Appearance] : was normal in appearance [Rate ___] : at [unfilled] breaths per minute [Normal Rhythm/Effort] : normal respiratory rhythm and effort [Clear Bilaterally] : the lungs were clear to auscultation bilaterally [Normal to Percussion] : the lungs were normal to percussion [5th Left ICS - MCL] : palpated at the 5th LICS in the midclavicular line [Heart Rate ___] : [unfilled] bpm [Normal Rate] : normal [Rhythm Regular] : regular [Normal S2] : normal S2 [Normal S1] : normal S1 [No Murmur] : no murmurs heard [No Pitting Edema] : no pitting edema present [2+] : left 2+ [No Abnormalities] : the abdominal aorta was not enlarged and no bruit was heard [Examination Of The Breasts] : a normal appearance [No Discharge] : no discharge [No Lymphangitis] : no lymphangitis observed [Normal Kyphosis] : normal kyphosis [No Visual Abnormalities] : no visible abnormalities [No Scoliosis] : no scoliosis [Normal Lordosis] : normal lordosis [No Tenderness to Palpation] : no spine tenderness on palpation [No Masses] : no masses [Full ROM] : full ROM [No Pain with ROM] : no pain with motion in any direction [Intact] : all reflexes within normal limits bilaterally [Normal Station and Gait] : the gait and station were normal [Normal Motor Tone] : the muscle tone was normal [Involuntary Movements] : no involuntary movements were seen [Examination Of The Hair] : texture and distribution of hair was normal [Normal Scalp] : inspection of the scalp showed no abnormalities [Complexion Medium] : medium complexion [Normal] : the deep tendon reflexes were normal [Normal Mental Status] : the patient's orientation, memory, attention, language and fund of knowledge were normal [Appropriate] : appropriate [Enlarged Diffusely] : was not enlarged [JVP Elevated ___cm] : the JVP was not elevated [S4] : no S4 [S3] : no S3 [Lt] : no varicose veins of the left leg [Rt] : no varicose veins of the right leg [Right Carotid Bruit] : no bruit heard over the right carotid [Left Carotid Bruit] : no bruit heard over the left carotid [Right Femoral Bruit] : no bruit heard over the right femoral artery [Bruit] : no bruit heard [Left Femoral Bruit] : no bruit heard over the left femoral artery [Preauricular Lymph Nodes Enlarged Bilaterally] : nodes not enlarged [Postauricular Lymph Nodes Enlarged Bilaterally] : nodes not enlarged [Submandibular Lymph Nodes Enlarged Bilaterally] : nodes not enlarged [Submental Lymph Nodes Enlarged] : nodes not enlarged [Suboccipital Lymph Nodes Enlarged Bilaterally] : nodes not enlarged [Cervical Lymph Nodes Enlarged Posterior Bilaterally] : nodes not enlarged [Cervical Lymph Nodes Enlarged Anterior Bilaterally] : nodes not enlarged [Supraclavicular Lymph Nodes Enlarged Bilaterally] : nodes not enlarged [Axillary Lymph Nodes Enlarged Bilaterally] : nodes not enlarged [Epitrochlear Lymph Nodes Enlarged Bilaterally] : nodes not enlarged [Femoral Lymph Nodes Enlarged Bilaterally] : nodes not enlarged [Inguinal Lymph Nodes Enlarged Bilaterally] : nodes not enlarged [Abnormal Color] : normal color and pigmentation [Skin Lesions 1] : no skin lesions were observed [Tattoo - Single] : no tattoos observed [Skin Turgor Decreased] : normal skin turgor [Impaired Insight] : intact insight [Impaired judgment] : intact judgment [de-identified] : tongue normal teethin good repair  [de-identified] : refer to gyn

## 2020-06-03 NOTE — ASSESSMENT
[FreeTextEntry1] : health She needs pneumo vax will give in Oct  after she has fully recovered from covid. She needs dental exam mammogram and gyn exam .Seh is up to date with colonoscopy\par 2 bmi 28  Weight loss, exercise, and diet control were discussed and are highly encouraged. Treatment options were given such as, aqua therapy, and contacting a nutritionist. Recommended to use the elliptical, stationary bike, less use of treadmill. Mindful eating was explained to the patient Obesity is associated with worsening asthma, shortness of breath, and potential for cardiac disease, diabetes, and other underlying medical conditions.\par she has lost weight then gained weight but is kelly to eating healthy and will exercise as able. \par 3. HPN needs to lose weight low salt diet DASH stands for Dietary Approaches to Stop Hypertension. The DASH diet can help lower high blood pressure and cholesterol and other fats in your blood. It can help lower your risk for heart attack and stroke and help you lose weight. This diet is low in sodium (salt) and rich in nutrients.\par \par \par \par \par \par How DASH Works\par \par \par \par \par \par \par The DASH diet reduces high blood pressure by lowering the amount of sodium in your diet to 2300 milligrams (mg) a day. Lowering sodium to 1500 mg a day reduces blood pressure even more. It also includes variety of foods rich in nutrients that help lower blood pressure, such as potassium, calcium, and magnesium.\par \par On the DASH diet, you will:\par •Get plenty of vegetables, fruits, and fat-free or low-fat dairy\par •Include whole grains, beans, seeds, nuts, and vegetable oils\par •Eat lean meats, poultry, and fish\par •Cut back on salt, red meat, sweets, and sugary drinks\par •Limit alcoholic beverages \par \par You should also get at least 30 minutes of moderate-intensity exercise most days of the week. Examples include brisk walking or riding a bike. Aim to get 2 hours and 30 minutes of exercise per week.\par \par You can follow the DASH diet if you want to prevent high blood pressure. It can also help you lose extra weight. Most people can benefit from lowering sodium intake to 2300 milligrams (mg) a day.\par \par Your health care provider may suggest cutting back to 1500 mg a day if you:\par •Already have high blood pressure\par •Have diabetes or chronic kidney disease\par •Are \par •Are age 51 or older \par \par If you take medicine to treat high blood pressure, do not stop taking your medicine while on the DASH diet. Be sure to tell your provider you are following the DASH diet.\par \par \par \par \par \par How to get Started\par \par \par \par \par \par \par On the DASH diet, you can eat foods from all food groups. But you eat more of the foods that are naturally low in salt, cholesterol, and saturated fats. You will also include foods that are high in potassium, calcium, magnesium, and fiber.\par \par Here's a list of the food groups and how many servings of each you should have per day. For a diet that has 2000 calories per day, you should eat:\par •Vegetables (4 to 5 servings a day)\par •Fruits (4 to 5 servings a day)\par •Low-fat or fat-free dairy products, such as milk and yogurt (2 to 3 servings a day)\par •Whole grains (7 to 8 servings a day, and 3 should be whole grains)\par •Fish, lean meats, and poultry (2 servings or less a day)\par •Beans, seeds, and nuts (4 to 5 servings a week)\par •Fats and oils (2 to 3 servings a day)\par •Sweets or added sugars, such as jelly, hard candy, maple syrup, sorbet, and sugar (fewer than 5 servings a week) \par \par The number of servings you have each day depends on how many calories you need.\par •If you're trying to lose weight, you may need fewer servings than listed.\par •If you are not very active, aim for the lower number of servings listed.\par •If you are moderately active, have the higher number of servings.\par •If you are very active, you may need more servings than listed.\par \par Your provider can help find the right number of servings a day for you.\par \par \par \par \par \par Know your Serving Sizes\par \par \par \par \par \par \par To know how much to eat, you need to know serving sizes. Below are sample servings for each food group.\par \par Vegetables:\par •1 cup (70 grams) raw leafy vegetables\par •½ cup (90 grams) chopped raw or cooked vegetables \par \par Fruits:\par •1 medium fruit (6 ounces or 168 grams)\par •½ cup (70 grams) fresh, frozen, or canned fruit\par •¼ cup (25 grams) dried fruit \par \par Fat-free or low-fat dairy products:\par •1 cup (240 milliliters) milk or yogurt\par •1½ ounce (oz) or 50 grams (g) cheese \par \par Whole grains (Aim to make all of your grain choices whole grain. Whole grain products contain more fiber and protein than "refined" grain products.):\par •1 slice bread\par •½ cup (80 grams) cooked rice, pasta, or cereal \par \par Lean meats, poultry, and fish:\par •3 oz (85 g) of cooked fish, lean meat, or poultry \par \par Nuts, seeds, and legumes:\par •½ cup (90 grams) cooked legumes (dried beans, peas)\par •1/3 cup (45 grams) nuts\par •1 tablespoon (10 grams) seeds \par \par Fats and oils:\par •1 teaspoon (5 milliliters) vegetable oil\par •2 tablespoons (30 grams) low-fat salad dressing\par •1 teaspoon (5 grams) soft margarine \par \par Sweets and added sugars:\par •1 tablespoon (15 grams) sugar\par •1 tablespoon (15 grams) jelly or jam\par •½ cup (70 grams) sorbet, gelatin dessert \par \par \par \par \par \par Tips for Following DASH\par \par \par \par \par \par \par It's easy to follow the DASH diet. But it might mean making some changes to how you currently eat. To get started:\par •DO NOT try to make changes all at once. It's fine to change your eating habits gradually.\par •To add vegetables to your diet, try having a salad at lunch. Or, add cucumber, lettuce, shredded carrots, or tomatoes to your sandwiches.\par •There should always been something green on your plate. It's fine to use frozen vegetables instead of fresh. Just make sure the package does not contain added salt or fat.\par •Add sliced fruit to your cereal or oatmeal for breakfast.\par •For dessert, choose fresh fruit or low-fat frozen yogurt instead of high-calorie sweets, such as cakes or pies.\par •Choose healthy snacks, such as unsalted rice cakes or popcorn, raw vegetables, or yogurt. Dried fruits, seeds, and nuts also make great snack choices. Just keep these portions small.\par •Think of meat as part of your meal, instead of the main course. Limit your servings of lean meat to 6 ounces (170 grams) a day. You can have two 3-ounce (85 grams) servings during the day.\par •Try cooking without meat at least twice each week. Instead, eat beans, nuts, tofu, or eggs for your protein.\par \par \par \par \par \par Tips to Lower Your Salt\par \par \par \par \par \par \par To lower the amount of salt in your diet:\par •Take the saltshaker off the table.\par •Flavor your food with herbs and spices instead of salt. Lemon, lime, and vinegar also add flavor.\par •Avoid canned foods and frozen entrees. They are often high in salt. When you make things from scratch you have more control over how much salt goes in them.\par •Check all food labels for sodium. You may be surprised at how much you find, and where you find it. Frozen dinners, soups, salad dressings, and prepared foods often have a lot of sodium.\par •Choose foods that contain less than 5% for the daily value of sodium.\par •Look for low-sodium versions of foods when you can find them.\par •Limit foods and condiments that have a lot of salt, such as pickles, olives, cured meats, ketchup, soy sauce, mustard, and barbeque sauce.\par •When dining out, ask that your food be made with no added salt or MSG. \par continue present medication. \par 4.  papillary thyroid cancer  fu with endocrinologist thyroiglobulin  \par 5.  lung nodules  I appreciate Dr Esme jenkins and will fu ct of chest in Oct and then 2022 .  \par 6 covid she is recovering and has good o2 sat. COVID-19 stands for "coronavirus disease 2019." It is caused by a virus called SARS-CoV-2. The virus first appeared in late 2019 and quickly spread around the world.\par \par People with COVID-19 can have fever, cough, and trouble breathing. Problems with breathing happen when the infection affects the lungs and causes pneumonia (figure 1).\par \par Most people who get COVID-19 will not get severely ill. But some do. In many areas, leaders are telling people to stay home and away from other people. This is to try to slow the spread of the virus.\par \par \par How is COVID-19 spread?\par The virus that causes COVID-19 mainly spreads from person to person. This usually happens when a sick person coughs, sneezes, or talks near other people. Doctors also think it is possible to get sick if you touch a surface that has the virus on it and then touch your mouth, nose, or eyes. This is similar to how the flu spreads, but the virus that causes COVID-19 spreads more easily.\par \par From what experts know so far, the virus seems to spread most easily when people are showing symptoms. But it is also possible to spread it without having symptoms.\par \par \par What are the symptoms of COVID-19?\par Symptoms usually start 4 or 5 days after a person is infected with the virus. But in some people, it can take up to 2 weeks for symptoms to appear.\par \par Symptoms can include:\par \par ?Fever \par \par ?Dry cough\par \par ?Feeling tired\par \par ?Muscle aches\par \par ?Trouble breathing \par \par \par Although less common, some people have other symptoms, such as headache, sore throat, runny nose, or problems with their sense of smell or taste. Some have digestive problems like nausea or diarrhea. \par \par For most people, symptoms will get better within a few weeks. Some people even show no symptoms at all. But in others, COVID-19 can lead to serious problems like pneumonia, not getting enough oxygen, heart problems, or even death. This is more common in people who are 65 years or older or have other health problems like heart disease, diabetes, lung disease, or cancer.\par \par While children can get COVID-19, they are less likely to have severe symptoms. More information about COVID-19 and children is available separately. (See "Patient education: Coronavirus disease 2019 (COVID-19) and children (The Basics)".)\par \par \par What should I do if I have symptoms?\par If you have a fever, cough, or trouble breathing, call your doctor or nurse. They will ask about your symptoms. They might also ask about any recent travel and whether you have been around anyone who might be sick.\par \par If your symptoms are not severe, it is best to call before you go in. They can tell you what to do and whether you need to be seen in person. Many people with only mild symptoms should stay home and avoid other people until they get better. If you do need to go to the clinic or hospital, cover your nose and mouth with cloth. This helps protect other people. The staff might also have you wait someplace away from other people.\par \par If you are severely ill and need to go to the clinic or hospital right away, you should still call ahead. This way the staff can care for you while taking steps to protect others.\par \par \par Will I need tests?\par If your doctor or nurse suspects you have COVID-19, they might take a swab from inside your nose, and possibly your mouth, and send it to a lab for testing. If you are coughing up mucus, they might also test a sample of the mucus. These tests can help your doctor figure out if you have COVID-19 or another illness. \par \par In some areas, it might not be possible to test everyone who might have been exposed to the virus. If your doctor cannot test you, they might tell you to stay home, avoid other people, and call if your symptoms get worse.\par \par Your doctor might also order a chest X-ray or computed tomography (CT) scan to check your lungs.\par \par \par How is COVID-19 treated?\par There is no known specific treatment for COVID-19. Many people will be able to stay home while they get better, but people with serious symptoms or other health problems might need to go to the hospital:\par \par ?Mild illness – Mild illness means you might have symptoms like fever and cough, but you do not have trouble breathing. Most people with COVID-19 have mild illness and can rest at home until they get better. This usually takes about 2 weeks, but it's not the same for everyone.\par \par \par If you are recovering from COVID-19, it's important to stay home and "self-isolate" until your doctor or nurse tells you it's safe to go back to your normal activities. Self-isolation means staying apart from other people, even the people you live with. When you can stop self-isolation will depend on how long it has been since you had symptoms, and in some cases, whether you have had a negative test (showing that the virus is no longer in your body). \par \par \par ?Severe illness – If you have more severe illness with trouble breathing, you might need to stay in the hospital, possibly in the intensive care unit (also called the "ICU"). While you are there, you will most likely be in a special isolation room. Only medical staff will be allowed in the room, and they will have to wear special gowns, gloves, masks, and eye protection.\par \par \par The doctors and nurses can monitor and support your breathing and other body functions and make you as comfortable as possible. You might need extra oxygen to help you breathe easily. If you are having a very hard time breathing, you might need to be put on a ventilator. This is a machine to help you breathe.\par \par \par Doctors are studying several different treatments to learn whether they might work to treat COVID-19. In certain cases, doctors might recommend these treatments or being part of a clinical trial. A clinical trial is a scientific study that tests new medicines to see how well they work.\par \par \par Can COVID-19 be prevented?\par There is not yet a vaccine to prevent COVID-19. But there are things you can do to reduce your chances of getting it. These steps are a good idea for everyone, especially if you are in an area where the infection is spreading very quickly. But they are extra important for people age 65 years or older or who have other health problems. To help slow the spread of infection:\par \par ?Practice "social distancing." This means keeping people, even those who are healthy, away from each other. It is also sometimes called "physical distancing." The goal is to slow the spread of the virus that causes COVID-19.\par \par \par Avoiding large groups and events is an important part of social distancing. But even small gatherings can be risky, so it's best to stay home as much as you possibly can. When you do need to leave your home (for example, to get food or medicine), try your best to stay at least 6 feet (about 2 meters) away from other people. Some expert groups also recommend covering your face when you need to go out. (See 'What about face masks?' below.)\par \par \par ?Wash your hands with soap and water often. This is especially important after being out in public. Make sure to rub your hands with soap for at least 20 seconds, cleaning your wrists, fingernails, and in between your fingers. Then rinse your hands and dry them with a paper towel you can throw away.\par \par \par If you are not near a sink, you can use a hand sanitizing gel to clean your hands. The gels with at least 60 percent alcohol work the best. But it is better to wash with soap and water if you can.\par \par \par ?Avoid touching your face with your hands, especially your mouth, nose, or eyes.\par \par \par ?Avoid traveling if you can. Some experts recommend not traveling to or from certain areas where COVID-19 is spreading quickly. But any form of travel, especially if you spend time in crowded places like airports, increases your risk. If lots of people travel, it also makes it more likely that the virus will spread to more parts of the world.\par \par \par \par What about face masks?\par When COVID-19 started to spread throughout the world, expert groups in the [place] did not recommend that most people wear a face mask for protection. That's because if healthy people buy a lot of medical masks, there won't be enough for the doctors and nurses who need them. \par 7 -- diabetes-The following has been discussed:---\par -Targets for weight and HgA1c have been discussed with patient \par -FS goals have been reviewed with the patient in detail:\par AM <130 post meal<160-180\par -Diet and weight goals have been discussed with the patient in detail.\par -The importance of exercise in the treatment of diabetes has been discussed \par with the patient in detail.\par -Extensive dietary advice provided to patient and the need to avoid concentrated \par sweets/simple carbohydrates and to ensure to consume protein with each meal. \par -Patient instructed to limit carbohydrates to 60 gms per meal and 15 gms per \par snacks. \par -Patient to keep a blood sugar log to check fasting and before meals\par -Patient instructed on importance of daily feet inspection and to reports any \par open lesions to physician promptly\par 8. atrial fibrillation continue eliquis , needs good bp control and is fu with cardiologist today.   we discussed fall precautions Make your home safer – To avoid falling at home, get rid of things that might make you trip or slip. This might include furniture, electrical cords, clutter, and loose rugs Keep your home well-lit so that you can easily see where you are going. Avoid storing things in high places so you don't have to reach or climb.\par ?Wear sturdy shoes that fit well – Wearing shoes with high heels or slippery soles, or shoes that are too loose, can lead to falls. Walking around in bare feet, or only socks, can also increase your risk of falling.\par ?Take vitamin D pills – Taking vitamin D might lower the risk of falls in older people. This is because vitamin D helps make bones and muscles stronger. Your doctor can talk to you about whether you should take extra vitamin D, and how much.\par ?Stay active – Exercising on a regular basis can help lower your risk of falling. It might also help prevent you from getting hurt if you do fall. It is best to do a few different activities that help with both strength and balance. There are many kinds of exercise that can be safe for older people. These include walking, swimming, and Carlos Eduardo Chi (a Chinese martial art that involves slow, gentle movements).\par ?Use a cane, walker, and other safety devices – If your doctor recommends that you use a cane or walker, be sure that it's the right size and you know how to use it. There are other devices that might help you avoid falling, too. These include grab bars or a sturdy seat for the shower, non-slip bath mats, and hand rails or treads for the stairs (to prevent slipping).\par If you worry that you could fall, there are also alarm buttons that let you call for help if you fall and can't get up.\par \par \par

## 2020-06-03 NOTE — HISTORY OF PRESENT ILLNESS
[FreeTextEntry1] : cpe  [de-identified] : Pt is a 54 yr old woman who was dx with bilateral pneumonia due to covid and during recovery she developed atrial fibrillation and was given medication and then cardioverted and has fu with Dr Jackson. She also was fu by thoracic surgeon for her pulmonary nodules which have been stable and will fu with her in 1-2 yrs.  She is on Eliquis presently.  She is presently feeling well and almost back to her normal capacity .  She states since starting eliquis she ha had increased hair loss.  She is presently here for cpe.

## 2020-06-03 NOTE — HISTORY OF PRESENT ILLNESS
[FreeTextEntry1] : 53 yo F with HTN, HLD, T1DM, thyroid CA s/p resection and PEDRO treatment, (-) NST 12/2017, COVID-19 infection in April during which she developed A fib with RVR s/p DCCV, with echocardiogram 04/2020 showing preserved EF, who presents for follow-up visit. \par \par Patient reports she has had no further episodes of palpitations since her cardioversion. Has rare episodes of chest pain since her COVID-19 diagnosis, though it has been decreasing progressively (now at ~20%). Denies dyspnea or lower extremity edema. Compliant with Eliquis, has no bleeding issues. Was seen by EP, is pending ILR placement to assess for AF recurrence. Thinks her hair loss has stabilized; she decided to remain on Eliquis instead of switching to Xarelto. \par belinda Brings home BP log, BP runs in 130s. When asked about lisinopril, she states she developed lip and face swelling in 01/2020. She was also started on nitrofurantoin for UTI around that time, had been on lisinopril for years previously with no issues. \par

## 2020-06-04 LAB
25(OH)D3 SERPL-MCNC: 35 NG/ML
ALBUMIN SERPL ELPH-MCNC: 4.6 G/DL
ALP BLD-CCNC: 51 U/L
ALT SERPL-CCNC: 15 U/L
ANION GAP SERPL CALC-SCNC: 12 MMOL/L
APPEARANCE: CLEAR
AST SERPL-CCNC: 20 U/L
BASOPHILS # BLD AUTO: 0.04 K/UL
BASOPHILS NFR BLD AUTO: 1.3 %
BILIRUB SERPL-MCNC: 0.3 MG/DL
BILIRUBIN URINE: NEGATIVE
BLOOD URINE: NEGATIVE
BUN SERPL-MCNC: 9 MG/DL
CALCIUM SERPL-MCNC: 9.5 MG/DL
CHLORIDE SERPL-SCNC: 99 MMOL/L
CHOLEST SERPL-MCNC: 183 MG/DL
CHOLEST/HDLC SERPL: 2.7 RATIO
CO2 SERPL-SCNC: 26 MMOL/L
COLOR: COLORLESS
CREAT SERPL-MCNC: 0.48 MG/DL
EOSINOPHIL # BLD AUTO: 0.11 K/UL
EOSINOPHIL NFR BLD AUTO: 3.6 %
ESTIMATED AVERAGE GLUCOSE: 143 MG/DL
FRUCTOSAMINE SERPL-MCNC: 306 UMOL/L
GLUCOSE QUALITATIVE U: NEGATIVE
GLUCOSE SERPL-MCNC: 91 MG/DL
HBA1C MFR BLD HPLC: 6.6 %
HCT VFR BLD CALC: 42.8 %
HDLC SERPL-MCNC: 68 MG/DL
HGB BLD-MCNC: 13.4 G/DL
IMM GRANULOCYTES NFR BLD AUTO: 0.3 %
KETONES URINE: NEGATIVE
LDLC SERPL CALC-MCNC: 104 MG/DL
LEUKOCYTE ESTERASE URINE: NEGATIVE
LYMPHOCYTES # BLD AUTO: 1.14 K/UL
LYMPHOCYTES NFR BLD AUTO: 37.6 %
MAN DIFF?: NORMAL
MCHC RBC-ENTMCNC: 29.3 PG
MCHC RBC-ENTMCNC: 31.3 GM/DL
MCV RBC AUTO: 93.4 FL
MONOCYTES # BLD AUTO: 0.3 K/UL
MONOCYTES NFR BLD AUTO: 9.9 %
NEUTROPHILS # BLD AUTO: 1.43 K/UL
NEUTROPHILS NFR BLD AUTO: 47.3 %
NITRITE URINE: NEGATIVE
PH URINE: 6.5
PLATELET # BLD AUTO: 433 K/UL
POTASSIUM SERPL-SCNC: 4.2 MMOL/L
PROT SERPL-MCNC: 7.4 G/DL
PROTEIN URINE: NEGATIVE
RBC # BLD: 4.58 M/UL
RBC # FLD: 14.1 %
SARS-COV-2 IGG SERPL IA-ACNC: 15.4 INDEX
SARS-COV-2 IGG SERPL QL IA: POSITIVE
SODIUM SERPL-SCNC: 138 MMOL/L
SPECIFIC GRAVITY URINE: 1
T4 FREE SERPL-MCNC: 1.7 NG/DL
T4 SERPL-MCNC: 9.8 UG/DL
THYROGLOB AB SERPL-ACNC: <20 IU/ML
THYROGLOB SERPL-MCNC: <0.2 NG/ML
TRIGL SERPL-MCNC: 54 MG/DL
TSH SERPL-ACNC: 0.33 UIU/ML
UROBILINOGEN URINE: NORMAL
WBC # FLD AUTO: 3.03 K/UL

## 2020-06-04 RX ORDER — PREDNISONE 20 MG/1
20 TABLET ORAL
Qty: 10 | Refills: 0 | Status: COMPLETED | COMMUNITY
Start: 2020-01-09

## 2020-06-04 RX ORDER — EPINEPHRINE 0.3 MG/.3ML
0.3 INJECTION INTRAMUSCULAR
Qty: 2 | Refills: 0 | Status: ACTIVE | COMMUNITY
Start: 2020-01-09

## 2020-06-04 RX ORDER — PHENAZOPYRIDINE HYDROCHLORIDE 200 MG/1
200 TABLET ORAL
Qty: 6 | Refills: 0 | Status: COMPLETED | COMMUNITY
Start: 2019-12-27

## 2020-06-04 RX ORDER — LEVOTHYROXINE SODIUM 200 UG/1
200 TABLET ORAL DAILY
Qty: 30 | Refills: 0 | Status: COMPLETED | COMMUNITY
End: 2020-06-04

## 2020-06-04 RX ORDER — NITROFURANTOIN (MONOHYDRATE/MACROCRYSTALS) 25; 75 MG/1; MG/1
100 CAPSULE ORAL
Qty: 14 | Refills: 0 | Status: COMPLETED | COMMUNITY
Start: 2019-12-27

## 2020-06-10 ENCOUNTER — TRANSCRIPTION ENCOUNTER (OUTPATIENT)
Age: 55
End: 2020-06-10

## 2020-06-18 ENCOUNTER — APPOINTMENT (OUTPATIENT)
Dept: ELECTROPHYSIOLOGY | Facility: CLINIC | Age: 55
End: 2020-06-18

## 2020-06-20 ENCOUNTER — APPOINTMENT (OUTPATIENT)
Dept: DISASTER EMERGENCY | Facility: CLINIC | Age: 55
End: 2020-06-20

## 2020-06-20 LAB — SARS-COV-2 N GENE NPH QL NAA+PROBE: NOT DETECTED

## 2020-06-22 ENCOUNTER — OUTPATIENT (OUTPATIENT)
Dept: OUTPATIENT SERVICES | Facility: HOSPITAL | Age: 55
LOS: 1 days | End: 2020-06-22
Payer: COMMERCIAL

## 2020-06-22 ENCOUNTER — RESULT REVIEW (OUTPATIENT)
Age: 55
End: 2020-06-22

## 2020-06-22 ENCOUNTER — APPOINTMENT (OUTPATIENT)
Dept: MAMMOGRAPHY | Facility: IMAGING CENTER | Age: 55
End: 2020-06-22
Payer: COMMERCIAL

## 2020-06-22 DIAGNOSIS — Z00.8 ENCOUNTER FOR OTHER GENERAL EXAMINATION: ICD-10-CM

## 2020-06-22 DIAGNOSIS — E89.0 POSTPROCEDURAL HYPOTHYROIDISM: Chronic | ICD-10-CM

## 2020-06-22 PROCEDURE — 77063 BREAST TOMOSYNTHESIS BI: CPT | Mod: 26

## 2020-06-22 PROCEDURE — 77067 SCR MAMMO BI INCL CAD: CPT | Mod: 26

## 2020-06-22 PROCEDURE — 77063 BREAST TOMOSYNTHESIS BI: CPT

## 2020-06-22 PROCEDURE — 77067 SCR MAMMO BI INCL CAD: CPT

## 2020-06-23 ENCOUNTER — OUTPATIENT (OUTPATIENT)
Dept: OUTPATIENT SERVICES | Facility: HOSPITAL | Age: 55
LOS: 1 days | Discharge: HOME | End: 2020-06-23
Payer: COMMERCIAL

## 2020-06-23 DIAGNOSIS — I48.91 UNSPECIFIED ATRIAL FIBRILLATION: ICD-10-CM

## 2020-06-23 DIAGNOSIS — E89.0 POSTPROCEDURAL HYPOTHYROIDISM: Chronic | ICD-10-CM

## 2020-06-23 LAB
GLUCOSE BLDC GLUCOMTR-MCNC: 214 MG/DL — HIGH (ref 70–99)
HCG UR QL: NEGATIVE — SIGNIFICANT CHANGE UP

## 2020-06-23 PROCEDURE — 33285 INSJ SUBQ CAR RHYTHM MNTR: CPT

## 2020-06-23 NOTE — H&P CARDIOLOGY - PMH
COVID-19  4/2020  Diabetes type 1, controlled    HTN (hypertension)    Hypothyroid    Hypothyroid    Paroxysmal atrial fibrillation  with DCCV

## 2020-06-23 NOTE — H&P CARDIOLOGY - HISTORY OF PRESENT ILLNESS
54 year old female with HTN, hypothyroid, asthma (never intubated), GERD, type I DM with insulin pump, COVID+ in 4/2020, paroxysmal atrial fibrillation (on Eliquis) with DCCV with intermittent palpitations who presents for loop recorder implant for long term monitoring of suspected recurrent afib.   Denies chest pain, SOB, syncope. 54 year old female with HTN, hypothyroid s/p thyroidectomy for thyroid CA and radiation, asthma (never intubated), GERD, type I DM with insulin pump, COVID+ in 4/2020, paroxysmal atrial fibrillation (on Eliquis) with DCCV 4/2020 for palpitations and has since been feeling well. Presents for loop recorder implant for long term monitoring of suspected recurrent afib.   Denies chest pain, SOB, syncope.     Denies fever, cough, chills, headache, flu like symptoms, sick contact or recent travel  COVID PCR not detected on 6/20/2020

## 2020-07-08 ENCOUNTER — APPOINTMENT (OUTPATIENT)
Dept: ELECTROPHYSIOLOGY | Facility: CLINIC | Age: 55
End: 2020-07-08
Payer: COMMERCIAL

## 2020-07-08 PROCEDURE — 93285 PRGRMG DEV EVAL SCRMS IP: CPT

## 2020-07-08 RX ORDER — ATORVASTATIN CALCIUM 10 MG/1
10 TABLET, FILM COATED ORAL
Qty: 30 | Refills: 5 | Status: DISCONTINUED | COMMUNITY
Start: 2020-06-03 | End: 2020-07-08

## 2020-07-13 PROBLEM — E03.9 HYPOTHYROIDISM, UNSPECIFIED: Chronic | Status: ACTIVE | Noted: 2020-06-23

## 2020-07-13 PROBLEM — I48.0 PAROXYSMAL ATRIAL FIBRILLATION: Chronic | Status: ACTIVE | Noted: 2020-06-23

## 2020-07-13 PROBLEM — I10 ESSENTIAL (PRIMARY) HYPERTENSION: Chronic | Status: ACTIVE | Noted: 2020-06-23

## 2020-07-13 PROBLEM — U07.1 COVID-19: Chronic | Status: ACTIVE | Noted: 2020-06-23

## 2020-08-10 ENCOUNTER — APPOINTMENT (OUTPATIENT)
Dept: ELECTROPHYSIOLOGY | Facility: CLINIC | Age: 55
End: 2020-08-10
Payer: COMMERCIAL

## 2020-08-10 PROCEDURE — 93298 REM INTERROG DEV EVAL SCRMS: CPT

## 2020-08-10 PROCEDURE — G2066: CPT

## 2020-09-03 RX ORDER — APIXABAN 5 MG/1
5 TABLET, FILM COATED ORAL
Qty: 180 | Refills: 3 | Status: DISCONTINUED | COMMUNITY
Start: 2020-05-14 | End: 2020-09-03

## 2020-09-09 ENCOUNTER — APPOINTMENT (OUTPATIENT)
Dept: INTERNAL MEDICINE | Facility: CLINIC | Age: 55
End: 2020-09-09
Payer: COMMERCIAL

## 2020-09-09 ENCOUNTER — LABORATORY RESULT (OUTPATIENT)
Age: 55
End: 2020-09-09

## 2020-09-09 VITALS
HEIGHT: 67 IN | WEIGHT: 190 LBS | OXYGEN SATURATION: 99 % | DIASTOLIC BLOOD PRESSURE: 87 MMHG | SYSTOLIC BLOOD PRESSURE: 126 MMHG | TEMPERATURE: 97.7 F | HEART RATE: 87 BPM | BODY MASS INDEX: 29.82 KG/M2

## 2020-09-09 DIAGNOSIS — R10.84 GENERALIZED ABDOMINAL PAIN: ICD-10-CM

## 2020-09-09 DIAGNOSIS — L85.3 XEROSIS CUTIS: ICD-10-CM

## 2020-09-09 DIAGNOSIS — Z23 ENCOUNTER FOR IMMUNIZATION: ICD-10-CM

## 2020-09-09 PROCEDURE — G0008: CPT

## 2020-09-09 PROCEDURE — 99214 OFFICE O/P EST MOD 30 MIN: CPT | Mod: 25

## 2020-09-09 PROCEDURE — 90686 IIV4 VACC NO PRSV 0.5 ML IM: CPT

## 2020-09-09 RX ORDER — SUCRALFATE 1 G/10ML
1 SUSPENSION ORAL
Qty: 1 | Refills: 1 | Status: COMPLETED | COMMUNITY
Start: 2020-05-14 | End: 2020-09-09

## 2020-09-09 RX ORDER — AMMONIUM LACTATE 12 %
12 CREAM (GRAM) TOPICAL TWICE DAILY
Qty: 1 | Refills: 3 | Status: ACTIVE | COMMUNITY
Start: 2020-09-09 | End: 1900-01-01

## 2020-09-09 NOTE — PHYSICAL EXAM
[PERRL] : pupils equal round and reactive to light [Normal Oropharynx] : the oropharynx was normal [Supple] : supple [No Lymphadenopathy] : no lymphadenopathy [No Edema] : there was no peripheral edema [No Extremity Clubbing/Cyanosis] : no extremity clubbing/cyanosis [Normal Anterior Cervical Nodes] : no anterior cervical lymphadenopathy [Normal Posterior Cervical Nodes] : no posterior cervical lymphadenopathy [Comprehensive Foot Exam Normal] : Right and left foot were examined and both feet are normal. No ulcers in either foot. Toes are normal and with full ROM.  Normal tactile sensation with monofilament testing throughout both feet [Normal] : affect was normal and insight and judgment were intact

## 2020-09-09 NOTE — END OF VISIT
[>50% of the face to face encounter time was spent on counseling and/or coordination of care for ___] : Greater than 50% of the face to face encounter time was spent on counseling and/or coordination of care for [unfilled] [FreeTextEntry3] : resident was present

## 2020-09-09 NOTE — COUNSELING
[Fall prevention counseling provided] : Fall prevention counseling provided [Adequate lighting] : Adequate lighting [No throw rugs] : No throw rugs [Use proper foot wear] : Use proper foot wear [Engage in a relaxing activity] : Engage in a relaxing activity [Sleep ___ hours/day] : Sleep [unfilled] hours/day [Use recommended devices] : Use recommended devices [Plan in advance] : Plan in advance [Potential consequences of obesity discussed] : Potential consequences of obesity discussed [Benefits of weight loss discussed] : Benefits of weight loss discussed [Structured Weight Management Program suggested:] : Structured weight management program suggested [Encouraged to use exercise tracking device] : Encouraged to use exercise tracking device [Encouraged to increase physical activity] : Encouraged to increase physical activity [Encouraged to maintain food diary] : Encouraged to maintain food diary [____ min/wk Activity] : [unfilled] min/wk activity [FreeTextEntry1] : diabetic low fat

## 2020-09-09 NOTE — ASSESSMENT
[FreeTextEntry1] : 1 dm- ---The following has been discussed:---\par -Targets for weight and HgA1c have been discussed with patient \par -FS goals have been reviewed with the patient in detail:\par AM <130 post meal<160-180\par -Diet and weight goals have been discussed with the patient in detail.\par -The importance of exercise in the treatment of diabetes has been discussed \par with the patient in detail.\par -Extensive dietary advice provided to patient and the need to avoid concentrated \par sweets/simple carbohydrates and to ensure to consume protein with each meal. \par -Patient instructed to limit carbohydrates to 60 gms per meal and 15 gms per \par snacks. \par -Patient to keep a blood sugar log to check fasting and before meals\par -Patient instructed on importance of daily feet inspection and to reports any \par open lesions to physician promptly\par \par 2.  hdl- her ldl was not optimal and will likely need to start a low dose crestor  I discussed low fat diet and will repeat if her ldl is not 70  will start medication,. High levels of lipids (fats) in the blood, including cholesterol and triglycerides, is also called "hyperlipidemia." Hyperlipidemia can significantly increase a person's risk of heart attacks, strokes, and other serious problems. To lower these risks, doctors often recommend that people with hyperlipidemia try to lower their cholesterol levels through a combination of dietary changes, exercise, and medication. Most cholesterol-lowering therapies are aimed at reducing low-density lipoprotein (LDL) or "bad" cholesterol. High levels of LDL can cause atherosclerosis (buildup of fatty deposits in the blood vessels), which is the major cause of cardiovascular events (heart attacks, strokes, and lower extremity or peripheral artery disease).\par This article will discuss the relationship between hyperlipidemia and cardiovascular disease, the different types of lipids, and expert recommendations for lipid screening. Treatment options for high cholesterol are discussed separately. HYPERLIPIDEMIA AND CARDIOVASCULAR DISEASE Hyperlipidemia can significantly increase a person's risk of developing cardiovascular disease, including disease of blood vessels supplying the heart (coronary artery disease), brain (cerebrovascular disease), and limbs (peripheral artery disease). These conditions happen when the blood vessels get clogged with fatty deposits, restricting blood flow. When this happens, it can lead to heart attacks, strokes, and other serious problems such as narrowing of the arteries that deliver blood to most organs.\par Other risk factors for cardiovascular disease — In addition to hyperlipidemia, there are a number of other factors that increase a person's risk of cardiovascular disease:\par ?Diabetes ?High blood pressure (hypertension)?Chronic kidney disease ?Cigarette smoking\par ?Having a parent or sibling who developed cardiovascular disease at a young age (<55 years for men or <65 years for women)\par Regardless of whether any of these factors are present, a person's risk for developing cardiovascular disease increases with age. Men have a higher risk than women at any age.\par Calculating your risk of cardiovascular disease — There are various online calculators that allow you to input information about yourself in order to estimate your risk of developing cardiovascular disease. Different calculators can give different scores depending on the variables they use in calculating a person's risk. Examples include:\par ?[place] 2008 data for men ?[place] 2008 data for women?American College of Cardiology/American Heart Association 2013 data for men and women Your health care provider can help you understand how to use the available calculators to better understand your risk and interpret the results.\par TYPES OF LIPIDSThe term "lipids" includes cholesterol and triglycerides, although there are other types of lipids, too. Standard lipid blood tests include a measurement of total cholesterol, low-density lipoprotein (LDL) and high-density lipoprotein (HDL) cholesterol, and triglycerides.\par Total cholesterol — A high total cholesterol level can increase your risk of cardiovascular disease. However, decisions about when to treat high cholesterol are usually based upon the level of LDL or HDL cholesterol rather than the level of total cholesterol . In general:\par ?A total cholesterol level of less than 200 mg/dL (5.17 mmol/L) is normal.\par ?A total cholesterol level of 200 to 239 mg/dL (5.17 to 6.18 mmol/L) is borderline high.\par ?A total cholesterol level of 240 mg/dL (6.21 mmol/L) or greater is high.\par The total cholesterol level can be measured any time of day. It is not necessary to fast (ie, avoid eating) before testing.\par LDL cholesterol — This is sometimes called "bad" cholesterol, as high LDL levels raise your risk of cardiovascular disease. Some health care providers make decisions about how to treat hyperlipidemia based on the LDL cholesterol level. Your goal LDL cholesterol depends on your overall risk for a cardiovascular event (heart attack or stroke). Several factors affect your personal risk, including whether you have a history of cardiovascular disease and your risk of developing cardiovascular disease in the future (based on your age, sex, and other major risk factors)People at higher risk are often given a lower LDL cholesterol goal.\par If your health care provider plans to measure your LDL cholesterol level, he or she may ask you to fast (avoid eating) for nine hours or longer in order to obtain an accurate result. A fasting test is more important if you have elevated triglycerides (>200 mg/dL) (see ) or when your health care provider plans to measure your fasting blood sugar (glucose). However, in many cases, your LDL cholesterol can be measured even after you have eaten recently.\par HDL cholesterol — Not all cholesterol is bad. High levels of HDL ("good") cholesterol actually lower your risk of cardiovascular disease. A level of 60 mg/dL (1.55 mmol/L) or higher is excellent, while levels of HDL cholesterol less than 40 mg/dL (1.03 mmol/L) are considered lower than desirable. There is no treatment that lowers your risk for a cardiovascular event by raising HDL cholesterol. \par As with total cholesterol, the HDL cholesterol can be measured with a blood test at any time, regardless of whether you have been fasting.\par Non-HDL cholesterol — "Non-HDL" cholesterol includes LDL cholesterol as well as other types of plaque-forming lipids that do not fall into these categories. Non-HDL cholesterol accounts for the cholesterol carried by very low density lipoproteins (VLDL), intermediate density lipoproteins (IDL), and lipoprotein (a). It can be calculated by subtracting HDL cholesterol from total cholesterol. Since total cholesterol and HDL cholesterol can be measured accurately without fasting, so can non-HDL cholesterol. Non-HDL cholesterol is generally considered a better predictor of cardiovascular risk than LDL cholesterol.\par An appropriate non-HDL cholesterol goal can be calculated by adding 30 mg/dL (0.78 mmol/L) to your LDL cholesterol goal. As discussed, the LDL cholesterol goal depends on a number of factors. \par 3.  hpn well controlled continue present management.  \par DIETARY SALT (SODIUM); DASH DIET AND BLOOD PRESSURE:\par To decrease the sodium in your diet: \par · Use fresh vegetables and foods as much as possible.\par · Avoid canned and processed foods. Cured meats such as sinah, ham, and sausages are high in salt.\par · Try using different herbs and spices in your cooking instead of salt.\par · In restaurants, avoid foods with sauces, cheese, and cured meats. Ask for low-sodium choices.\par To get more potassium in your diet, eat:\par · Bananas, fresh or dried apricots, peaches, citrus fruits, melons\par · Cauliflower, broccoli, tomatoes, carrots, raw spinach, beet greens, potatoes\par To get more magnesium in your diet, eat:\par · Whole grain foods, leafy green vegetables, dried fruits\par • Fish and seafood, poultry \par To get more calcium in your diet, eat:\par · Nonfat milk, yogurt, and low-fat cheeses \par · [place] and sardines\par · Cooked dried beans\par · Broccoli, kale, and bok jose r\par · Tofu or soybean curd\par DASH stands for "dietary approaches to stop hypertension." The DASH diet is low in total and saturated fat. It is rich in fruits, vegetables, and low-fat dairy foods. The diet allows you to get natural fiber, calcium, and magnesium from food. It prevents or lowers high blood pressure. It can also help lower cholesterol in your blood. \par Don't change how you eat all at once. It's much more likely that you'll succeed if you make only one or two small changes at a time. Wait until those changes are a habit, then make a couple more changes. Some good starting steps include: \par Add one serving of vegetables to your meals at lunch and dinner. This is an easy way to help you get more vegetables in your diet. \par Have a piece of fruit as an afternoon or after-dinner snack. One glass of juice at breakfast is not enough fruit in your diet. \par Use half your usual amount of butter, margarine, or salad dressing. \par Buy nonfat salad dressing or nonfat sour cream.\par Follow this guide to select your menu of meals. The number of calories we want you to eat each day will tell you how many servings you can choose from each food group.\par Calories: 1600 2100 2600 3100 Servings Grains 6 7 ½ 10 ½ 12 ½ Vegetables 	 4 4 ½ 5 6 Fruit 4 5 5 6 Dairy (low-fat) 2 ½ 3 3 3 ½ Meat, poultry, fish ½ 1 ½ 2 2 ½ Nuts, seeds ½ ½ ½ 1 Fats and sweets 1 ½ 2 ½ 3 4\par Grains and grain products like breads and cereals provide energy, fiber and vitamins. Whole grains have more of these nutrients. One serving equals one of the following:\par Bagel, 1/2 medium; Barley, cooked 1/2 cup; Biscuit, country style 1 medium; Bread, whole wheat, white 1 slice; Cereals, cold or cooked, 1/2 cup; Cornbread, 1 medium piece; Crackers, kasi, 2; Crackers, saltine, 4; Dinner roll, 1medium; English muffin, ½; Hamburger bun, ½; Muffin, 1 medium; Pancake, 1 medium; Pasta, 1/2 cup cooked; Alicia, 1/2 large or 1 small; Popcorn, 1 cup popped; Pretzels, 1 ounce; Rice, white, brown, or wild, 1/2 cup cooked; Tortillas, corn or flour, 1 medium; Waffle, 1 medium; Wheat germ, 1/4 cup; \par Vegetables are rich sources of potassium, magnesium, and fiber. One serving is 1/2 cup of any of the following cooked vegetables:\par Asparagus, Beans (green, yellow), Beets, Broccoli, [place] Sprouts, Carrots, Cauliflower, [place], chicory, mustard and turnip (and other) greens, Corn, Kale, Lima beans, Mixed vegetables, Okra, Parsnips, Peas, green, Potatoes (1/2 medium or 1/2 cup mashed), Pumpkin, [place], Spaghetti or tomato sauce, Spinach, Squash (zucchini or yellow), Stewed tomatoes, [place], Sweet potatoes, Turnips, Yam \par Raw vegetables: Carrots,1/2 cup; Celery, 1/2 cup; Lettuce (mana, loose-leaf, green-leaf), 1 cup; Peppers, 1/2 cup; Spinach, 1 cup; Tomato, 1/2\par Fruits and fruit juices are important sources of potassium and magnesium. Fruits also contain fiber and are low in sodium and fat. One serving equals:\par Any fruit juice, # cup (6 ounces); Canned or frozen fruit, ½ cup (includes applesauce); Dried fruit, ¼ cup; \par Fresh fruit:\par Apple, 1 medium; Apricots, 2 medium; Banana, 1 medium; Berries, 1/2 cup; Melon, 1 wedge, or 1/2 cup; Cherries, 10; Grapefruit, 1/2; Grapes, 15; Kiwi, 1 medium; Luis, 1/2 small; Nectarine, 1 medium; Orange, 1 medium; Peach, 1 medium; Pear, 1 medium; Pineapple, 1/2 cup; Plums, 2 medium; Tangerine, 1 large\par Dairy foods provide protein and calcium. Use low-fat or nonfat dairy products to cut down on fat. One serving equals:\par Skim milk, 1 cup (8 ounces); 1% low fat milk, 1 cup (8 ounces); 2% low fat milk, 1 cup (8 ounces) nonfat dry milk powder (1/3 cup); Low-fat cottage cheese, 1 cup (8 ounces); Parmesan cheese, 1 tablespoon; Mozzarella cheese, part skim, 1/4 cup (1 ounce); Low-fat cheddar cheese, 11/2 ounces; Ricotta cheese, part skim milk or nonfat, 1/4 cup (11/2 ounces); Other low fat or nonfat cheeses (11/2 oz.); Low-fat or nonfat yogurt, fruit-flavored or plain, 1 cup (8 ounces)\par Low-fat or nonfat frozen yogurt, 1/2 cup (4 ounces); Note: People who can't digest dairy products can try taking lactase enzyme pills or drops (available at drug and grocery stores) when they eat dairy. There is also milk available with the enzyme already added. Or you can buy lactose-free milk.\par Meat, poultry, and fish are good sources of protein and magnesium. One serving equals:\par Lean meat including beef, veal, or pork, 3 ounces cooked; Skinless, white meat poultry including turkey, chicken, 3 ounces; Fish and shellfish, 3 ounces cooked; Low-fat luncheon meats, 1 ounce; Egg, 1 medium; Tofu, 3 ounces\par Note: Three ounces of cooked meat is about the size of a deck of cards.\par Nuts, seeds, and legumes are rich sources of energy, magnesium, potassium, protein and fiber. Nuts and seeds are also high in fat, so portions should be small.\par Almonds, 1/3 cup; Beans such as kidney, wilson, and navy, 1/2 cup cooked; Chickpeas and lentils, 1/2 cup cooked; Cashews, 1/3 cup; Filberts, 1/3 cup; Mixed nuts, 1/3 cup; Peanut butter, 2 tablespoons; Peanuts, 1/3 cup; Sesame seeds, 2 tablespoons; Sunflower seeds, 2 tablespoons; Tofu, regular, 3 ounces; Walnuts, 1/3 cup \par Following the above diet will give you about 27% fat in your diet. The goal is to have 30% or less of the calories you eat each day be from fat. To meet that goal, do not eat more than 2-3 servings daily of added fat. Also try to limit sweets. One serving equals:\par Butter or margarine, 1 teaspoon; Mayonnaise, 1 teaspoon; Low-fat mayonnaise, 1 tablespoon; Salad dressing, 1 tablespoon; Low-fat salad dressing, 2 tablespoons; Oil, 1 teaspoon (use olive, canola, safflower, or other vegetable oils); Candy, hard, 3 pieces; Jelly or jam, 1 tablespoon); Jell-O, 1/2 cup; Jelly beans, 1/2 ounce; Maple syrup, 1 tablespoon; Popsicle, 1; Sherbet or nonfat or low-fat frozen yogurt, 1/2 cup; Sugar, 1 tablespoon; Sugared lemonade or fruit punch, 1 cup (8 ounces); Note: Try diet fruit-flavored gelatin or frozen, canned, or fresh fruit for dessert.\par \par Small amounts of alcohol may have benefits to the heart and blood pressure. However, excess use of alcohol can cause damage to the brain, liver and other organs. It can lead to high blood pressure. Drinking more than two drinks (15 ml) every day can raise your blood pressure. 15 ml of alcohol equals: \par • one 12-ounce bottle of beer \par • a half glass (5 ounces) of wine \par • 1 ounce (one shot) of 100 proof hard liquor She was started on cozarr and although she had angioedema with lisnopril so far she has not had any symptoms .  \par \par \par 4. papillary thyroid cancer  She is not hyperthyroid  which is not optimal for her but with recent atrial fibrillation I am concerned about  increasing her medication to obtain a hyperthyroid state . She is seeing her endocrinologist  .\par 5 atrial fib  I am unsure of etiology but likely due to her covid infection.  Patient's CHADS2-VASc score is 3 (HTN, diabetes, female ), corresponding to ~3% annual risk of stroke if off systemic anticoagulation.\par -On Eliquis, no bleeding issues\par 6 flu vaccine needed  hand out given to pt  siode effects discussed.

## 2020-09-09 NOTE — HISTORY OF PRESENT ILLNESS
[FreeTextEntry1] : atrial fib hypertension diabetes.   [de-identified] : Pt is a  54 yr old woman who has thyroid cancer and  is not hyperthyroid and is concerning also with her atrial fibrillation recently developing  .   she has recently had implant ILR and no atrial fibrillation was detected and pt was taken off eliquis. presently she is feeling good and has hot flashes .  she has achy feeling in her heels but overall she is is feeling well. She is exercising regularly and walking and going to Pilates.

## 2020-09-09 NOTE — HEALTH RISK ASSESSMENT
[No] : No [0] : 1) Little interest or pleasure doing things: Not at all (0) [No falls in past year] : Patient reported no falls in the past year [] : No [de-identified] : walks Pilates  [de-identified] : diabetic diet.

## 2020-09-11 LAB
25(OH)D3 SERPL-MCNC: 35.2 NG/ML
ALBUMIN SERPL ELPH-MCNC: 4.4 G/DL
ALP BLD-CCNC: 52 U/L
ALT SERPL-CCNC: 18 U/L
ANION GAP SERPL CALC-SCNC: 13 MMOL/L
APPEARANCE: CLEAR
AST SERPL-CCNC: 19 U/L
BASOPHILS # BLD AUTO: 0.05 K/UL
BASOPHILS NFR BLD AUTO: 1.3 %
BILIRUB SERPL-MCNC: 0.3 MG/DL
BILIRUBIN URINE: NEGATIVE
BLOOD URINE: NEGATIVE
BUN SERPL-MCNC: 12 MG/DL
CALCIUM SERPL-MCNC: 9.4 MG/DL
CHLORIDE SERPL-SCNC: 99 MMOL/L
CHOLEST SERPL-MCNC: 181 MG/DL
CHOLEST/HDLC SERPL: 2.5 RATIO
CO2 SERPL-SCNC: 25 MMOL/L
COLOR: YELLOW
CREAT SERPL-MCNC: 0.51 MG/DL
CREAT SPEC-SCNC: 48 MG/DL
EOSINOPHIL # BLD AUTO: 0.1 K/UL
EOSINOPHIL NFR BLD AUTO: 2.7 %
ESTIMATED AVERAGE GLUCOSE: 148 MG/DL
FRUCTOSAMINE SERPL-MCNC: 316 UMOL/L
GLUCOSE QUALITATIVE U: NEGATIVE
GLUCOSE SERPL-MCNC: 187 MG/DL
HBA1C MFR BLD HPLC: 6.8 %
HCT VFR BLD CALC: 44.9 %
HDLC SERPL-MCNC: 71 MG/DL
HGB BLD-MCNC: 13.7 G/DL
IMM GRANULOCYTES NFR BLD AUTO: 0.3 %
KETONES URINE: NEGATIVE
LDLC SERPL CALC-MCNC: 100 MG/DL
LEUKOCYTE ESTERASE URINE: NEGATIVE
LYMPHOCYTES # BLD AUTO: 1.32 K/UL
LYMPHOCYTES NFR BLD AUTO: 35.2 %
MAN DIFF?: NORMAL
MCHC RBC-ENTMCNC: 29.1 PG
MCHC RBC-ENTMCNC: 30.5 GM/DL
MCV RBC AUTO: 95.5 FL
MICROALBUMIN 24H UR DL<=1MG/L-MCNC: <1.2 MG/DL
MICROALBUMIN/CREAT 24H UR-RTO: NORMAL MG/G
MONOCYTES # BLD AUTO: 0.28 K/UL
MONOCYTES NFR BLD AUTO: 7.5 %
NEUTROPHILS # BLD AUTO: 1.99 K/UL
NEUTROPHILS NFR BLD AUTO: 53 %
NITRITE URINE: NEGATIVE
PH URINE: 7
PLATELET # BLD AUTO: 344 K/UL
POTASSIUM SERPL-SCNC: 4.5 MMOL/L
PROT SERPL-MCNC: 7.2 G/DL
PROTEIN URINE: NEGATIVE
RBC # BLD: 4.7 M/UL
RBC # FLD: 13.4 %
SODIUM SERPL-SCNC: 137 MMOL/L
SPECIFIC GRAVITY URINE: 1.01
TRIGL SERPL-MCNC: 45 MG/DL
TSH SERPL-ACNC: 0.14 UIU/ML
UROBILINOGEN URINE: NORMAL
WBC # FLD AUTO: 3.75 K/UL

## 2020-09-14 ENCOUNTER — APPOINTMENT (OUTPATIENT)
Dept: ELECTROPHYSIOLOGY | Facility: CLINIC | Age: 55
End: 2020-09-14
Payer: COMMERCIAL

## 2020-09-14 PROCEDURE — 93298 REM INTERROG DEV EVAL SCRMS: CPT

## 2020-09-14 PROCEDURE — G2066: CPT

## 2020-10-01 ENCOUNTER — APPOINTMENT (OUTPATIENT)
Dept: ELECTROPHYSIOLOGY | Facility: CLINIC | Age: 55
End: 2020-10-01

## 2020-10-19 ENCOUNTER — APPOINTMENT (OUTPATIENT)
Dept: ELECTROPHYSIOLOGY | Facility: CLINIC | Age: 55
End: 2020-10-19
Payer: COMMERCIAL

## 2020-10-19 PROCEDURE — G2066: CPT

## 2020-10-19 PROCEDURE — 93298 REM INTERROG DEV EVAL SCRMS: CPT

## 2020-10-29 ENCOUNTER — RESULT REVIEW (OUTPATIENT)
Age: 55
End: 2020-10-29

## 2020-10-29 ENCOUNTER — APPOINTMENT (OUTPATIENT)
Dept: CT IMAGING | Facility: IMAGING CENTER | Age: 55
End: 2020-10-29
Payer: COMMERCIAL

## 2020-10-29 ENCOUNTER — OUTPATIENT (OUTPATIENT)
Dept: OUTPATIENT SERVICES | Facility: HOSPITAL | Age: 55
LOS: 1 days | End: 2020-10-29
Payer: COMMERCIAL

## 2020-10-29 DIAGNOSIS — R91.1 SOLITARY PULMONARY NODULE: ICD-10-CM

## 2020-10-29 DIAGNOSIS — E89.0 POSTPROCEDURAL HYPOTHYROIDISM: Chronic | ICD-10-CM

## 2020-10-29 PROCEDURE — 71250 CT THORAX DX C-: CPT | Mod: 26

## 2020-10-29 PROCEDURE — 71250 CT THORAX DX C-: CPT

## 2020-11-23 ENCOUNTER — APPOINTMENT (OUTPATIENT)
Dept: ELECTROPHYSIOLOGY | Facility: CLINIC | Age: 55
End: 2020-11-23
Payer: COMMERCIAL

## 2020-11-23 PROCEDURE — G2066: CPT

## 2020-11-23 PROCEDURE — 93298 REM INTERROG DEV EVAL SCRMS: CPT

## 2020-12-14 ENCOUNTER — APPOINTMENT (OUTPATIENT)
Dept: CARDIOLOGY | Facility: CLINIC | Age: 55
End: 2020-12-14
Payer: COMMERCIAL

## 2020-12-14 VITALS
HEART RATE: 95 BPM | RESPIRATION RATE: 16 BRPM | TEMPERATURE: 97.7 F | HEIGHT: 67 IN | BODY MASS INDEX: 31.39 KG/M2 | DIASTOLIC BLOOD PRESSURE: 83 MMHG | OXYGEN SATURATION: 99 % | SYSTOLIC BLOOD PRESSURE: 143 MMHG | WEIGHT: 200 LBS

## 2020-12-14 VITALS — SYSTOLIC BLOOD PRESSURE: 124 MMHG | DIASTOLIC BLOOD PRESSURE: 75 MMHG

## 2020-12-14 PROCEDURE — 99072 ADDL SUPL MATRL&STAF TM PHE: CPT

## 2020-12-14 PROCEDURE — 99214 OFFICE O/P EST MOD 30 MIN: CPT

## 2020-12-14 NOTE — ASSESSMENT
[FreeTextEntry1] : 54 yo F with DM, HTN, COVID-19 with pAF s/p DCCV and ILR placement, with EF 55% by echo 04/2020, who presents for follow-up visit. \par \par 1. A fib: Patient reverted back to NSR, had ILR placed showing no further AF episodes for the past 6 months since device placement. \par -We discussed that AFib is a progressive disease and it can recur; patient may need to be back on Eliquis in the future\par -She will continue to follow with EP \par \par 2. HTN: appears well controlled on amlodipine and losartan \par  \par 3. HLD: Patient's 10-year risk of atherosclerotic cardiovascular disease (ASCVD) as estimated by the pooled cohort equations is 1.5%. While this does not usually need a statin, the presence of diabetes and LDL > 70 has a class IA indication for moderate intensity statin (i.e. atorvastatin 10mg PO QHS). Patient states she will discuss this with her PMD tomorrow, pending repeat lipid panel.\par -RX placed just in case, R/B/A discussed\par \par FUV 6 months\par

## 2020-12-14 NOTE — HISTORY OF PRESENT ILLNESS
[FreeTextEntry1] : 56 yo F with HTN, HLD, T1DM, thyroid CA s/p resection and PEDRO treatment, (-) NST 12/2017, COVID-19 infection in April during which she developed A fib with RVR s/p DCCV, with echocardiogram 04/2020 showing preserved EF, who presents for follow-up visit. \par \par Since her last visit, patient underwent loop recorder placement 06/2020, with no evidence of AF recurrence, and was taken off Eliquis by EP. Brings home BP log, BP runs in 130s. No issues with losartan. Still did not take atorvastatin, pending repeat lipid panel after PMD appointment tomorrow. Denies any chest pain, dyspnea, palpitations, lightheadedness, or syncope. Denies LE edema, orthopnea, or PND.\par \par

## 2020-12-15 ENCOUNTER — APPOINTMENT (OUTPATIENT)
Dept: INTERNAL MEDICINE | Facility: CLINIC | Age: 55
End: 2020-12-15
Payer: COMMERCIAL

## 2020-12-15 VITALS
WEIGHT: 200 LBS | SYSTOLIC BLOOD PRESSURE: 126 MMHG | BODY MASS INDEX: 31.39 KG/M2 | OXYGEN SATURATION: 98 % | DIASTOLIC BLOOD PRESSURE: 84 MMHG | HEIGHT: 67 IN | HEART RATE: 90 BPM | TEMPERATURE: 97.9 F

## 2020-12-15 DIAGNOSIS — J04.0 ACUTE LARYNGITIS: ICD-10-CM

## 2020-12-15 DIAGNOSIS — Z87.898 PERSONAL HISTORY OF OTHER SPECIFIED CONDITIONS: ICD-10-CM

## 2020-12-15 DIAGNOSIS — R06.02 SHORTNESS OF BREATH: ICD-10-CM

## 2020-12-15 DIAGNOSIS — K21.9 ACUTE LARYNGITIS: ICD-10-CM

## 2020-12-15 DIAGNOSIS — R10.13 EPIGASTRIC PAIN: ICD-10-CM

## 2020-12-15 DIAGNOSIS — T78.40XA ALLERGY, UNSPECIFIED, INITIAL ENCOUNTER: ICD-10-CM

## 2020-12-15 PROCEDURE — 99214 OFFICE O/P EST MOD 30 MIN: CPT

## 2020-12-15 PROCEDURE — 99072 ADDL SUPL MATRL&STAF TM PHE: CPT

## 2020-12-15 NOTE — ASSESSMENT
[FreeTextEntry1] : hpn \par DIETARY SALT (SODIUM); DASH DIET AND BLOOD PRESSURE:\par To decrease the sodium in your diet: \par · Use fresh vegetables and foods as much as possible.\par · Avoid canned and processed foods. Cured meats such as sinha, ham, and sausages are high in salt.\par · Try using different herbs and spices in your cooking instead of salt.\par · In restaurants, avoid foods with sauces, cheese, and cured meats. Ask for low-sodium choices.\par To get more potassium in your diet, eat:\par · Bananas, fresh or dried apricots, peaches, citrus fruits, melons\par · Cauliflower, broccoli, tomatoes, carrots, raw spinach, beet greens, potatoes\par To get more magnesium in your diet, eat:\par · Whole grain foods, leafy green vegetables, dried fruits\par • Fish and seafood, poultry \par To get more calcium in your diet, eat:\par · Nonfat milk, yogurt, and low-fat cheeses \par · [place] and sardines\par · Cooked dried beans\par · Broccoli, kale, and bok jose r\par · Tofu or soybean curd\par DASH stands for "dietary approaches to stop hypertension." The DASH diet is low in total and saturated fat. It is rich in fruits, vegetables, and low-fat dairy foods. The diet allows you to get natural fiber, calcium, and magnesium from food. It prevents or lowers high blood pressure. It can also help lower cholesterol in your blood. \par Don't change how you eat all at once. It's much more likely that you'll succeed if you make only one or two small changes at a time. Wait until those changes are a habit, then make a couple more changes. Some good starting steps include: \par Add one serving of vegetables to your meals at lunch and dinner. This is an easy way to help you get more vegetables in your diet. \par Have a piece of fruit as an afternoon or after-dinner snack. One glass of juice at breakfast is not enough fruit in your diet. \par Use half your usual amount of butter, margarine, or salad dressing. \par Buy nonfat salad dressing or nonfat sour cream.\par Follow this guide to select your menu of meals. The number of calories we want you to eat each day will tell you how many servings you can choose from each food group.\par Calories: 1600 2100 2600 3100 Servings Grains 6 7 ½ 10 ½ 12 ½ Vegetables 	 4 4 ½ 5 6 Fruit 4 5 5 6 Dairy (low-fat) 2 ½ 3 3 3 ½ Meat, poultry, fish ½ 1 ½ 2 2 ½ Nuts, seeds ½ ½ ½ 1 Fats and sweets 1 ½ 2 ½ 3 4\par Grains and grain products like breads and cereals provide energy, fiber and vitamins. Whole grains have more of these nutrients. One serving equals one of the following:\par Bagel, 1/2 medium; Barley, cooked 1/2 cup; Biscuit, country style 1 medium; Bread, whole wheat, white 1 slice; Cereals, cold or cooked, 1/2 cup; Cornbread, 1 medium piece; Crackers, kasi, 2; Crackers, saltine, 4; Dinner roll, 1medium; English muffin, ½; Hamburger bun, ½; Muffin, 1 medium; Pancake, 1 medium; Pasta, 1/2 cup cooked; Alicia, 1/2 large or 1 small; Popcorn, 1 cup popped; Pretzels, 1 ounce; Rice, white, brown, or wild, 1/2 cup cooked; Tortillas, corn or flour, 1 medium; Waffle, 1 medium; Wheat germ, 1/4 cup; \par Vegetables are rich sources of potassium, magnesium, and fiber. One serving is 1/2 cup of any of the following cooked vegetables:\par Asparagus, Beans (green, yellow), Beets, Broccoli, [place] Sprouts, Carrots, Cauliflower, [place], chicory, mustard and turnip (and other) greens, Corn, Kale, Lima beans, Mixed vegetables, Okra, Parsnips, Peas, green, Potatoes (1/2 medium or 1/2 cup mashed), Pumpkin, Rutabaga, Spaghetti or tomato sauce, Spinach, Squash (zucchini or yellow), Stewed tomatoes, Succotash, Sweet potatoes, Turnips, Yam \par Raw vegetables: Carrots,1/2 cup; Celery, 1/2 cup; Lettuce (mana, loose-leaf, green-leaf), 1 cup; Peppers, 1/2 cup; Spinach, 1 cup; Tomato, 1/2\par Fruits and fruit juices are important sources of potassium and magnesium. Fruits also contain fiber and are low in sodium and fat. One serving equals:\par Any fruit juice, # cup (6 ounces); Canned or frozen fruit, ½ cup (includes applesauce); Dried fruit, ¼ cup; \par Fresh fruit:\par Apple, 1 medium; Apricots, 2 medium; Banana, 1 medium; Berries, 1/2 cup; Melon, 1 wedge, or 1/2 cup; Cherries, 10; Grapefruit, 1/2; Grapes, 15; Kiwi, 1 medium; Luis, 1/2 small; Nectarine, 1 medium; Orange, 1 medium; Peach, 1 medium; Pear, 1 medium; Pineapple, 1/2 cup; Plums, 2 medium; Tangerine, 1 large\par Dairy foods provide protein and calcium. Use low-fat or nonfat dairy products to cut down on fat. One serving equals:\par Skim milk, 1 cup (8 ounces); 1% low fat milk, 1 cup (8 ounces); 2% low fat milk, 1 cup (8 ounces) nonfat dry milk powder (1/3 cup); Low-fat cottage cheese, 1 cup (8 ounces); Parmesan cheese, 1 tablespoon; Mozzarella cheese, part skim, 1/4 cup (1 ounce); Low-fat cheddar cheese, 11/2 ounces; Ricotta cheese, part skim milk or nonfat, 1/4 cup (11/2 ounces); Other low fat or nonfat cheeses (11/2 oz.); Low-fat or nonfat yogurt, fruit-flavored or plain, 1 cup (8 ounces)\par Low-fat or nonfat frozen yogurt, 1/2 cup (4 ounces); Note: People who can't digest dairy products can try taking lactase enzyme pills or drops (available at drug and grocery stores) when they eat dairy. There is also milk available with the enzyme already added. Or you can buy lactose-free milk.\par Meat, poultry, and fish are good sources of protein and magnesium. One serving equals:\par Lean meat including beef, veal, or pork, 3 ounces cooked; Skinless, white meat poultry including turkey, chicken, 3 ounces; Fish and shellfish, 3 ounces cooked; Low-fat luncheon meats, 1 ounce; Egg, 1 medium; Tofu, 3 ounces\par Note: Three ounces of cooked meat is about the size of a deck of cards.\par Nuts, seeds, and legumes are rich sources of energy, magnesium, potassium, protein and fiber. Nuts and seeds are also high in fat, so portions should be small.\par Almonds, 1/3 cup; Beans such as kidney, wilson, and navy, 1/2 cup cooked; Chickpeas and lentils, 1/2 cup cooked; Cashews, 1/3 cup; Filberts, 1/3 cup; Mixed nuts, 1/3 cup; Peanut butter, 2 tablespoons; Peanuts, 1/3 cup; Sesame seeds, 2 tablespoons; Sunflower seeds, 2 tablespoons; Tofu, regular, 3 ounces; Walnuts, 1/3 cup \par Following the above diet will give you about 27% fat in your diet. The goal is to have 30% or less of the calories you eat each day be from fat. To meet that goal, do not eat more than 2-3 servings daily of added fat. Also try to limit sweets. One serving equals:\par Butter or margarine, 1 teaspoon; Mayonnaise, 1 teaspoon; Low-fat mayonnaise, 1 tablespoon; Salad dressing, 1 tablespoon; Low-fat salad dressing, 2 tablespoons; Oil, 1 teaspoon (use olive, canola, safflower, or other vegetable oils); Candy, hard, 3 pieces; Jelly or jam, 1 tablespoon); Jell-O, 1/2 cup; Jelly beans, 1/2 ounce; Maple syrup, 1 tablespoon; Popsicle, 1; Sherbet or nonfat or low-fat frozen yogurt, 1/2 cup; Sugar, 1 tablespoon; Sugared lemonade or fruit punch, 1 cup (8 ounces); Note: Try diet fruit-flavored gelatin or frozen, canned, or fresh fruit for dessert.\par \par Small amounts of alcohol may have benefits to the heart and blood pressure. However, excess use of alcohol can cause damage to the brain, liver and other organs. It can lead to high blood pressure. Drinking more than two drinks (15 ml) every day can raise your blood pressure. 15 ml of alcohol equals: \par • one 12-ounce bottle of beer \par • a half glass (5 ounces) of wine \par • 1 ounce (one shot) of 100 proof hard liquor\par 2 dm - ---The following has been discussed:---\par -Targets for weight and HgA1c have been discussed with patient \par -FS goals have been reviewed with the patient in detail:\par AM <130 post meal<160-180\par -Diet and weight goals have been discussed with the patient in detail.\par -The importance of exercise in the treatment of diabetes has been discussed \par with the patient in detail.\par -Extensive dietary advice provided to patient and the need to avoid concentrated \par sweets/simple carbohydrates and to ensure to consume protein with each meal. \par -Patient instructed to limit carbohydrates to 60 gms per meal and 15 gms per \par snacks. \par -Patient to keep a blood sugar log to check fasting and before meals\par -Patient instructed on importance of daily feet inspection and to reports any \par open lesions to physician promptly\par \par 3. thyroid cancer fu tsh t4 thyroglobulin \par 4.  atrial fib  thus far it appears it was related to her covid infection. \par 5 hdl  Pt had seen Dr Jackson and has elevated hdl but ldl was 100 and felt with her atrial fib and diabetes she might benefit from starting lipid lowering medication and was given atorvastatin. \par \par

## 2020-12-15 NOTE — HISTORY OF PRESENT ILLNESS
[FreeTextEntry1] : fu  [de-identified] : Pt has seen by cardiologist and has loop reorder  with no evidence of af recurrence and is taken off  eliqiuis. she is feeling well and her blood sugars have been better controlled .She has cut out carbs and has been exercising regularly.   Pt had fu lung ct and has clear lungs.   No sob or chest pain no longer has dyspepsia and rash and  pruritis

## 2020-12-15 NOTE — HEALTH RISK ASSESSMENT
[No] : No [No falls in past year] : Patient reported no falls in the past year [0] : 2) Feeling down, depressed, or hopeless: Not at all (0) [] : No [de-identified] : Dr Jackson cardiology  [de-identified] : exercising regularly pilates  [de-identified] : daibetic

## 2020-12-15 NOTE — PHYSICAL EXAM
[Normal Sclera/Conjunctiva] : normal sclera/conjunctiva [PERRL] : pupils equal round and reactive to light [Normal Oropharynx] : the oropharynx was normal [No Lymphadenopathy] : no lymphadenopathy [Supple] : supple [Normal Rate] : normal rate  [Regular Rhythm] : with a regular rhythm [Normal S1, S2] : normal S1 and S2 [No Edema] : there was no peripheral edema [No Extremity Clubbing/Cyanosis] : no extremity clubbing/cyanosis [Normal Posterior Cervical Nodes] : no posterior cervical lymphadenopathy [Normal Anterior Cervical Nodes] : no anterior cervical lymphadenopathy [Normal] : affect was normal and insight and judgment were intact [Comprehensive Foot Exam Normal] : Right and left foot were examined and both feet are normal. No ulcers in either foot. Toes are normal and with full ROM.  Normal tactile sensation with monofilament testing throughout both feet

## 2020-12-16 LAB
ALBUMIN SERPL ELPH-MCNC: 4.5 G/DL
ALP BLD-CCNC: 63 U/L
ALT SERPL-CCNC: 64 U/L
ANION GAP SERPL CALC-SCNC: 9 MMOL/L
AST SERPL-CCNC: 48 U/L
BILIRUB SERPL-MCNC: 0.3 MG/DL
BUN SERPL-MCNC: 12 MG/DL
CALCIUM SERPL-MCNC: 9.8 MG/DL
CHLORIDE SERPL-SCNC: 102 MMOL/L
CHOLEST SERPL-MCNC: 187 MG/DL
CO2 SERPL-SCNC: 28 MMOL/L
CREAT SERPL-MCNC: 0.67 MG/DL
CREAT SPEC-SCNC: 50 MG/DL
ESTIMATED AVERAGE GLUCOSE: 148 MG/DL
FRUCTOSAMINE SERPL-MCNC: 313 UMOL/L
GLUCOSE SERPL-MCNC: 107 MG/DL
HBA1C MFR BLD HPLC: 6.8 %
HDLC SERPL-MCNC: 77 MG/DL
LDLC SERPL CALC-MCNC: 99 MG/DL
MICROALBUMIN 24H UR DL<=1MG/L-MCNC: <1.2 MG/DL
MICROALBUMIN/CREAT 24H UR-RTO: NORMAL MG/G
NONHDLC SERPL-MCNC: 110 MG/DL
POTASSIUM SERPL-SCNC: 4.6 MMOL/L
PROT SERPL-MCNC: 7.5 G/DL
SARS-COV-2 IGG SERPL IA-ACNC: 209 INDEX
SARS-COV-2 IGG SERPL QL IA: POSITIVE
SODIUM SERPL-SCNC: 139 MMOL/L
T4 FREE SERPL-MCNC: 1.7 NG/DL
T4 SERPL-MCNC: 9.4 UG/DL
THYROGLOB AB SERPL-ACNC: <20 IU/ML
THYROGLOB SERPL-MCNC: <0.2 NG/ML
TRIGL SERPL-MCNC: 54 MG/DL
TSH SERPL-ACNC: 0.25 UIU/ML

## 2020-12-28 ENCOUNTER — APPOINTMENT (OUTPATIENT)
Dept: ELECTROPHYSIOLOGY | Facility: CLINIC | Age: 55
End: 2020-12-28
Payer: COMMERCIAL

## 2020-12-28 PROCEDURE — G2066: CPT

## 2020-12-28 PROCEDURE — 93298 REM INTERROG DEV EVAL SCRMS: CPT

## 2021-01-12 ENCOUNTER — NON-APPOINTMENT (OUTPATIENT)
Age: 56
End: 2021-01-12

## 2021-01-26 ENCOUNTER — APPOINTMENT (OUTPATIENT)
Dept: OTOLARYNGOLOGY | Facility: CLINIC | Age: 56
End: 2021-01-26
Payer: COMMERCIAL

## 2021-01-26 VITALS
DIASTOLIC BLOOD PRESSURE: 82 MMHG | HEART RATE: 94 BPM | OXYGEN SATURATION: 97 % | SYSTOLIC BLOOD PRESSURE: 126 MMHG | TEMPERATURE: 97.8 F

## 2021-01-26 PROCEDURE — 31575 DIAGNOSTIC LARYNGOSCOPY: CPT

## 2021-01-26 PROCEDURE — 99072 ADDL SUPL MATRL&STAF TM PHE: CPT

## 2021-01-26 PROCEDURE — 99214 OFFICE O/P EST MOD 30 MIN: CPT | Mod: 25

## 2021-01-26 NOTE — REASON FOR VISIT
[FreeTextEntry2] : followup thyroidectomy with completion in October 2004 and a PND with frequent throat clearing and dry cough after vacation and air travel last week. [FreeTextEntry1] : Endocrinologist and referrer is Dulce Maria Rodriguez MD,  PCP is Kevin Yi MD,  [facility] [address]. [place]

## 2021-01-26 NOTE — PROCEDURE
[Image(s) Captured] : image(s) captured and filed [Gag Reflex] : gag reflex preventing mirror examination [Topical Lidocaine] : topical lidocaine [Flexible Endoscope] : examined with the flexible endoscope [Serial Number: ___] : Serial Number: [unfilled] [Unable to Cooperate with Mirror] : patient unable to cooperate with mirror [Oxymetazoline HCl] : oxymetazoline HCl [de-identified] : The nasal septum is minimally deviated to the left. There are no masses or polyps and the nasal mucosa and secretions are normal. The choanae and posterior nasopharynx are normal without masses or drainage. The Eustachian tube orifices appear patent. The pharynx, including the posterior and lateral pharyngeal walls, the vallecula and base of tongue are normal without ulcerations, lesions or masses. The hypopharynx including the pyriform sinuses open well without pooling of secretions, mucosal lesions or masses. The supraglottic larynx including the epiglottis, petiole, glossoepiglottic folds and pharyngoepiglottic folds are normal without mucosal lesions, ulcerations or masses. The glottis reveals normal false vocal folds. The true vocal folds are glistening white, tense and of equal length, without paralysis, having symmetric mobility on adduction and abduction. There are no mucosal lesions and normal mucosa but with tiny stable mid anterior bilateral vocal fold swellings in the moore's node position.  The posterior cricoid area has healthy pink mucosa in the interarytenoid area and esophageal inlet. There is mild thickening/edema and tiger striping of the interarytenoid mucosa but no erythema. The trachea is clear without narrowing, deviation or lesions. \par  [de-identified] : papillary thyroid carcinoma and cervical lymph nodes being monitored.

## 2021-01-26 NOTE — CONSULT LETTER
[Dear  ___] : Dear  [unfilled], [Consult Letter:] : I had the pleasure of evaluating your patient, [unfilled]. [Please see my note below.] : Please see my note below. [Consult Closing:] : Thank you very much for allowing me to participate in the care of this patient.  If you have any questions, please do not hesitate to contact me. [Sincerely,] : Sincerely, [Jorge Louis M.D., FACS, [facility]] : Jorge Louis M.D., FACS, [facility] [Director, [facility]] : Director, [facility] [[facility]] : [facility] [[facility]] : [facility]  [Certified in Neck Ultrasound/ ECNU & AIUM] : Certified in Neck Ultrasound/ ECNU & AIUM [] :  [Otolaryngology/Head & Neck Surgery] : Otolaryngology/Head & Neck Surgery [[facility]] : [facility] [FreeTextEntry3] : \par Jorge Louis M.D., FACS, ECNU\par Director [facility]\par [facility]\par Certified in Thyroid/Parathyroid/Neck Ultrasound, ECNU/ AIUM\par \par , Department of Otolaryngology\par [facility]\par

## 2021-01-26 NOTE — HISTORY OF PRESENT ILLNESS
[de-identified] : Janae is a 47 Y/O female who had undergone a partial thyroidectomy and subsequent completion thyroidectomy in Oct 2004 and PEDRO remnant ablation.  She had been followed by Dulce Maria Rodriguez, her endocrinologist with [facility]. Her weight has been stable, no voice changes other than mild raspiness, and denied SOB or new neck mass. On her last US imaging of the neck, a round, hypoechoic,  level III node was identified without a fatty hilum and suspicious for metastatic cancer but measured only  7 x 6 x 5mm. Her thyroglobulin has remained low and Tg antibodies are negative. \par  [FreeTextEntry1] : Janae is being monitored after a completion total thyroidectomy and I -131 remnant ablation in 2004.  She had a borderline rounded left level III node in 2013 that did not change on subsequent US exams and there were no cystic changes, or microcalcifications. On the report dated 03/15/18 there were no abnormal lymph nodes.  Again noted is a left level III lymph node that is rounded in appearance but stable when compared with the last prior ultrasound.  She is currently on Synthroid 200 mcg x 3 days and 175 x 4 days. Her last blood testing revealed a suppressed TSH (0.25), undetectable thyroglobulin at less than 0.20, thyroglobulin antibody at less than 20.  She is being followed Dulce Maria Rodriguez MD.  Her weight has increased slightly but had COVID -19 infection with mild symptoms in March and developed A Fib in April and started Eliquis x 3 months and now has a loop implant and monitored off anticoagulation since September.  She has no new palpable masses in her neck, she denies dysphagia, shortness of breath, neck pain or pressure.  Her voice has not changed.  She was treated for severe GERD and better now off antacids. Her last neck ultrasound was done on 2/26/2019.  This showed no evidence of recurrent thyroid cancer or change in the previously described non-enlarged and morphologically normal lymph nodes.

## 2021-02-01 ENCOUNTER — APPOINTMENT (OUTPATIENT)
Dept: ELECTROPHYSIOLOGY | Facility: CLINIC | Age: 56
End: 2021-02-01
Payer: COMMERCIAL

## 2021-02-01 PROCEDURE — 93298 REM INTERROG DEV EVAL SCRMS: CPT

## 2021-02-01 PROCEDURE — G2066: CPT

## 2021-02-11 NOTE — ED PROVIDER NOTE - PMH
Diabetes type 1, controlled    Hypothyroid
Grossly 3+/5 throughout/no strength deficits were identified

## 2021-02-21 ENCOUNTER — NON-APPOINTMENT (OUTPATIENT)
Age: 56
End: 2021-02-21

## 2021-03-08 ENCOUNTER — NON-APPOINTMENT (OUTPATIENT)
Age: 56
End: 2021-03-08

## 2021-03-08 ENCOUNTER — APPOINTMENT (OUTPATIENT)
Dept: ELECTROPHYSIOLOGY | Facility: CLINIC | Age: 56
End: 2021-03-08
Payer: COMMERCIAL

## 2021-03-08 PROCEDURE — 93298 REM INTERROG DEV EVAL SCRMS: CPT

## 2021-03-08 PROCEDURE — G2066: CPT

## 2021-03-19 ENCOUNTER — EMERGENCY (EMERGENCY)
Facility: HOSPITAL | Age: 56
LOS: 1 days | Discharge: HOME | End: 2021-03-19
Attending: EMERGENCY MEDICINE | Admitting: EMERGENCY MEDICINE
Payer: COMMERCIAL

## 2021-03-19 VITALS
RESPIRATION RATE: 18 BRPM | SYSTOLIC BLOOD PRESSURE: 130 MMHG | HEART RATE: 96 BPM | HEIGHT: 67 IN | DIASTOLIC BLOOD PRESSURE: 83 MMHG | OXYGEN SATURATION: 98 % | TEMPERATURE: 98 F

## 2021-03-19 VITALS
OXYGEN SATURATION: 98 % | HEART RATE: 82 BPM | SYSTOLIC BLOOD PRESSURE: 123 MMHG | DIASTOLIC BLOOD PRESSURE: 74 MMHG | RESPIRATION RATE: 18 BRPM

## 2021-03-19 DIAGNOSIS — E89.0 POSTPROCEDURAL HYPOTHYROIDISM: Chronic | ICD-10-CM

## 2021-03-19 LAB
ALBUMIN SERPL ELPH-MCNC: 4.1 G/DL — SIGNIFICANT CHANGE UP (ref 3.3–5)
ALP SERPL-CCNC: 52 U/L — SIGNIFICANT CHANGE UP (ref 40–120)
ALT FLD-CCNC: 32 U/L — SIGNIFICANT CHANGE UP (ref 4–33)
ANION GAP SERPL CALC-SCNC: 11 MMOL/L — SIGNIFICANT CHANGE UP (ref 7–14)
AST SERPL-CCNC: 32 U/L — SIGNIFICANT CHANGE UP (ref 4–32)
BASOPHILS # BLD AUTO: 0.04 K/UL — SIGNIFICANT CHANGE UP (ref 0–0.2)
BASOPHILS NFR BLD AUTO: 0.7 % — SIGNIFICANT CHANGE UP (ref 0–2)
BILIRUB SERPL-MCNC: <0.2 MG/DL — SIGNIFICANT CHANGE UP (ref 0.2–1.2)
BUN SERPL-MCNC: 10 MG/DL — SIGNIFICANT CHANGE UP (ref 7–23)
CALCIUM SERPL-MCNC: 8.9 MG/DL — SIGNIFICANT CHANGE UP (ref 8.4–10.5)
CHLORIDE SERPL-SCNC: 102 MMOL/L — SIGNIFICANT CHANGE UP (ref 98–107)
CO2 SERPL-SCNC: 24 MMOL/L — SIGNIFICANT CHANGE UP (ref 22–31)
CREAT SERPL-MCNC: 0.5 MG/DL — SIGNIFICANT CHANGE UP (ref 0.5–1.3)
EOSINOPHIL # BLD AUTO: 0.19 K/UL — SIGNIFICANT CHANGE UP (ref 0–0.5)
EOSINOPHIL NFR BLD AUTO: 3.3 % — SIGNIFICANT CHANGE UP (ref 0–6)
GLUCOSE SERPL-MCNC: 147 MG/DL — HIGH (ref 70–99)
HCT VFR BLD CALC: 40.2 % — SIGNIFICANT CHANGE UP (ref 34.5–45)
HGB BLD-MCNC: 12.8 G/DL — SIGNIFICANT CHANGE UP (ref 11.5–15.5)
IANC: 2.81 K/UL — SIGNIFICANT CHANGE UP (ref 1.5–8.5)
IMM GRANULOCYTES NFR BLD AUTO: 0.4 % — SIGNIFICANT CHANGE UP (ref 0–1.5)
LYMPHOCYTES # BLD AUTO: 2.27 K/UL — SIGNIFICANT CHANGE UP (ref 1–3.3)
LYMPHOCYTES # BLD AUTO: 40 % — SIGNIFICANT CHANGE UP (ref 13–44)
MCHC RBC-ENTMCNC: 28.4 PG — SIGNIFICANT CHANGE UP (ref 27–34)
MCHC RBC-ENTMCNC: 31.8 GM/DL — LOW (ref 32–36)
MCV RBC AUTO: 89.3 FL — SIGNIFICANT CHANGE UP (ref 80–100)
MONOCYTES # BLD AUTO: 0.35 K/UL — SIGNIFICANT CHANGE UP (ref 0–0.9)
MONOCYTES NFR BLD AUTO: 6.2 % — SIGNIFICANT CHANGE UP (ref 2–14)
NEUTROPHILS # BLD AUTO: 2.81 K/UL — SIGNIFICANT CHANGE UP (ref 1.8–7.4)
NEUTROPHILS NFR BLD AUTO: 49.4 % — SIGNIFICANT CHANGE UP (ref 43–77)
NRBC # BLD: 0 /100 WBCS — SIGNIFICANT CHANGE UP
NRBC # FLD: 0 K/UL — SIGNIFICANT CHANGE UP
PLATELET # BLD AUTO: 386 K/UL — SIGNIFICANT CHANGE UP (ref 150–400)
POTASSIUM SERPL-MCNC: 3.7 MMOL/L — SIGNIFICANT CHANGE UP (ref 3.5–5.3)
POTASSIUM SERPL-SCNC: 3.7 MMOL/L — SIGNIFICANT CHANGE UP (ref 3.5–5.3)
PROT SERPL-MCNC: 6.9 G/DL — SIGNIFICANT CHANGE UP (ref 6–8.3)
RBC # BLD: 4.5 M/UL — SIGNIFICANT CHANGE UP (ref 3.8–5.2)
RBC # FLD: 13.3 % — SIGNIFICANT CHANGE UP (ref 10.3–14.5)
SODIUM SERPL-SCNC: 137 MMOL/L — SIGNIFICANT CHANGE UP (ref 135–145)
TROPONIN T, HIGH SENSITIVITY RESULT: <6 NG/L — SIGNIFICANT CHANGE UP
WBC # BLD: 5.68 K/UL — SIGNIFICANT CHANGE UP (ref 3.8–10.5)
WBC # FLD AUTO: 5.68 K/UL — SIGNIFICANT CHANGE UP (ref 3.8–10.5)

## 2021-03-19 PROCEDURE — 99285 EMERGENCY DEPT VISIT HI MDM: CPT | Mod: 25

## 2021-03-19 PROCEDURE — 93010 ELECTROCARDIOGRAM REPORT: CPT

## 2021-03-19 PROCEDURE — 71046 X-RAY EXAM CHEST 2 VIEWS: CPT | Mod: 26

## 2021-03-19 RX ORDER — ACETAMINOPHEN 500 MG
650 TABLET ORAL ONCE
Refills: 0 | Status: COMPLETED | OUTPATIENT
Start: 2021-03-19 | End: 2021-03-19

## 2021-03-19 RX ADMIN — Medication 650 MILLIGRAM(S): at 20:26

## 2021-03-19 NOTE — ED PROVIDER NOTE - PATIENT PORTAL LINK FT
You can access the FollowMyHealth Patient Portal offered by [facility] by registering at the following website: [email or web address]. By joining [employer]’s FollowMyHealth portal, you will also be able to view your health information using other applications (apps) compatible with our system.

## 2021-03-19 NOTE — ED PROVIDER NOTE - CLINICAL SUMMARY MEDICAL DECISION MAKING FREE TEXT BOX
56 Y/O F w/ PMH of Covid, HTN, DM and paroxysmal afib s/p DCCV presents to ER for chest pain and headache.  Covid allergy labs  ACS evaluation  Re-evaluate 54 Y/O F w/ PMH of Covid, HTN, DM and paroxysmal afib s/p DCCV presents to ER for chest pain and headache.  ACS evaluation  Re-evaluate    Lab results within acceptable limits.  Will refer to allergy for further evaluation  Return precautions provided.
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
## 2021-03-19 NOTE — ED ADULT NURSE NOTE - NSIMPLEMENTINTERV_GEN_ALL_ED
Implemented All Universal Safety Interventions:  [place] to call system. Call bell, personal items and telephone within reach. Instruct patient to call for assistance. Room bathroom lighting operational. Non-slip footwear when patient is off stretcher. Physically safe environment: no spills, clutter or unnecessary equipment. Stretcher in lowest position, wheels locked, appropriate side rails in place.

## 2021-03-19 NOTE — ED PROVIDER NOTE - OBJECTIVE STATEMENT
54 Y/O F w/ PMH of Covid, HTN, DM and paroxysmal afib s/p DCCV presents to ER for chest pain and headache. Received Pfizer vaccine on 3/9 a few days later developed LT sided chest tightness. Describes as constant w/ waxing and waning intensity. Took Aspirin for headache w/ relief. Evaluated by UC who recommended ER follow up. Denies headache, nausea/vomiting, dizziness, SOB, palpitations, syncope, abdominal pain, dysuria or hematuria. 54 Y/O F w/ PMH of Covid, HTN, DM and paroxysmal afib s/p DCCV presents to ER for chest pain and headache. Received Pfizer vaccine on 3/9 a few days later developed LT sided chest tightness. Describes as constant w/ waxing and waning intensity. Took Aspirin for headache w/ relief. Evaluated by  who recommended ER follow up. Denies headache, nausea/vomiting, dizziness, SOB, palpitations, syncope, abdominal pain, dysuria or hematuria.    Attendiny[age] yo female presents with chest pain and headache.  has been ongoing since she got covid vaccine on 3/9.  no shortness of breath.  tolerating po well.

## 2021-03-19 NOTE — ED PROVIDER NOTE - NSFOLLOWUPCLINICS_GEN_ALL_ED_FT
[facility] Allergy and Immunology  Allergy  [address] [place] [address]  Phone: [phone]  Fax:   Follow Up Time: 4-6 Days

## 2021-03-19 NOTE — ED ADULT TRIAGE NOTE - CHIEF COMPLAINT QUOTE
Pt states she had her first covid vaccine dose on 3/9, developed a headache 2 days after, and left sided chest pain 4 days later. Pain radiates to left arm. Denies SOB. Pt states the symptoms are identical to when she had COVID in March of last year.

## 2021-03-19 NOTE — ED ADULT NURSE NOTE - OBJECTIVE STATEMENT
Pt arrives to intake complaining of chest tightness and headache that began around March 13th. Pt reports she received the first dose of the Pfizer vaccine on 3/9/21, and feels like her symptoms are "from the vaccine". Pt rates chest discomfort as 3/10. Pt a&ox3, skin intact, respirations even and unlabored, 20G placed in left arm, labs drawn and sent. Pt denies dizziness, fever, chills, cough or any other symptoms. Will continue to monitor.

## 2021-03-19 NOTE — ED PROVIDER NOTE - NS ED ROS FT
Constitutional: (-) fever   Head: Normal cephalic, Atraumatic  Eyes/ENT: (-) vision changes  Cardiovascular: (+) chest pain, (-) wheezing  Respiratory: (-) cough, (-) shortness of breath  Gastrointestinal: (-) vomiting, (-) diarrhea, (-) abdominal pain  : (-) dysuria   Musculoskeletal: (-) back pain  Integumentary: (-) rash, (-) edema  Neurological: (-)loc (+) headache  Allergic/Immunologic: (-) pruritus

## 2021-03-19 NOTE — ED PROVIDER NOTE - FAMILY HISTORY
Father  Still living? Unknown  Family history of atrial fibrillation, Age at diagnosis: Age Unknown

## 2021-03-22 ENCOUNTER — APPOINTMENT (OUTPATIENT)
Dept: INTERNAL MEDICINE | Facility: CLINIC | Age: 56
End: 2021-03-22
Payer: COMMERCIAL

## 2021-03-22 LAB — TOTAL HEM COMP BLD-ACNC: 31 U/ML — LOW (ref 42–95)

## 2021-03-22 PROCEDURE — 99441: CPT

## 2021-04-12 ENCOUNTER — NON-APPOINTMENT (OUTPATIENT)
Age: 56
End: 2021-04-12

## 2021-04-12 ENCOUNTER — APPOINTMENT (OUTPATIENT)
Dept: ELECTROPHYSIOLOGY | Facility: CLINIC | Age: 56
End: 2021-04-12
Payer: COMMERCIAL

## 2021-04-12 PROCEDURE — 93298 REM INTERROG DEV EVAL SCRMS: CPT

## 2021-04-12 PROCEDURE — G2066: CPT

## 2021-04-13 ENCOUNTER — APPOINTMENT (OUTPATIENT)
Dept: INTERNAL MEDICINE | Facility: CLINIC | Age: 56
End: 2021-04-13
Payer: COMMERCIAL

## 2021-04-13 VITALS
DIASTOLIC BLOOD PRESSURE: 81 MMHG | BODY MASS INDEX: 32.49 KG/M2 | HEART RATE: 86 BPM | HEIGHT: 67 IN | WEIGHT: 207 LBS | TEMPERATURE: 98.2 F | SYSTOLIC BLOOD PRESSURE: 120 MMHG

## 2021-04-13 PROCEDURE — 99072 ADDL SUPL MATRL&STAF TM PHE: CPT

## 2021-04-13 PROCEDURE — 99213 OFFICE O/P EST LOW 20 MIN: CPT

## 2021-04-13 RX ORDER — ETODOLAC 500 MG/1
500 TABLET, FILM COATED, EXTENDED RELEASE ORAL
Qty: 30 | Refills: 0 | Status: COMPLETED | COMMUNITY
Start: 2020-10-16 | End: 2021-04-13

## 2021-04-13 RX ORDER — ATORVASTATIN CALCIUM 10 MG/1
10 TABLET, FILM COATED ORAL
Qty: 90 | Refills: 3 | Status: DISCONTINUED | COMMUNITY
Start: 2020-12-14 | End: 2021-04-13

## 2021-04-13 NOTE — ASSESSMENT
[FreeTextEntry1] : dm - pt has seen eye doctor and  podiatrist  and dentist.  ---The following has been discussed:---\par -Targets for weight and HgA1c have been discussed with patient \par -FS goals have been reviewed with the patient in detail:\par AM <130 post meal<160-180\par -Diet and weight goals have been discussed with the patient in detail.\par -The importance of exercise in the treatment of diabetes has been discussed \par with the patient in detail.\par -Extensive dietary advice provided to patient and the need to avoid concentrated \par sweets/simple carbohydrates and to ensure to consume protein with each meal. \par -Patient instructed to limit carbohydrates to 60 gms per meal and 15 gms per \par snacks. \par -Patient to keep a blood sugar log to check fasting and before meals\par -Patient instructed on importance of daily feet inspection and to reports any \par open lesions to physician promptly\par \par 2.  Hpn well cotnrolled  continue medications.  \par DIETARY SALT (SODIUM); DASH DIET AND BLOOD PRESSURE:\par To decrease the sodium in your diet: \par · Use fresh vegetables and foods as much as possible.\par · Avoid canned and processed foods. Cured meats such as sinha, ham, and sausages are high in salt.\par · Try using different herbs and spices in your cooking instead of salt.\par · In restaurants, avoid foods with sauces, cheese, and cured meats. Ask for low-sodium choices.\par To get more potassium in your diet, eat:\par · Bananas, fresh or dried apricots, peaches, citrus fruits, melons\par · Cauliflower, broccoli, tomatoes, carrots, raw spinach, beet greens, potatoes\par To get more magnesium in your diet, eat:\par · Whole grain foods, leafy green vegetables, dried fruits\par • Fish and seafood, poultry \par To get more calcium in your diet, eat:\par · Nonfat milk, yogurt, and low-fat cheeses \par · [place] and sardines\par · Cooked dried beans\par · Broccoli, kale, and bok jose r\par · Tofu or soybean curd\par DASH stands for "dietary approaches to stop hypertension." The DASH diet is low in total and saturated fat. It is rich in fruits, vegetables, and low-fat dairy foods. The diet allows you to get natural fiber, calcium, and magnesium from food. It prevents or lowers high blood pressure. It can also help lower cholesterol in your blood. \par Don't change how you eat all at once. It's much more likely that you'll succeed if you make only one or two small changes at a time. Wait until those changes are a habit, then make a couple more changes. Some good starting steps include: \par Add one serving of vegetables to your meals at lunch and dinner. This is an easy way to help you get more vegetables in your diet. \par Have a piece of fruit as an afternoon or after-dinner snack. One glass of juice at breakfast is not enough fruit in your diet. \par Use half your usual amount of butter, margarine, or salad dressing. \par Buy nonfat salad dressing or nonfat sour cream.\par Follow this guide to select your menu of meals. The number of calories we want you to eat each day will tell you how many servings you can choose from each food group.\par Calories: 1600 2100 2600 3100 Servings Grains 6 7 ½ 10 ½ 12 ½ Vegetables 	 4 4 ½ 5 6 Fruit 4 5 5 6 Dairy (low-fat) 2 ½ 3 3 3 ½ Meat, poultry, fish ½ 1 ½ 2 2 ½ Nuts, seeds ½ ½ ½ 1 Fats and sweets 1 ½ 2 ½ 3 4\par Grains and grain products like breads and cereals provide energy, fiber and vitamins. Whole grains have more of these nutrients. One serving equals one of the following:\par Bagel, 1/2 medium; Barley, cooked 1/2 cup; Biscuit, country style 1 medium; Bread, whole wheat, white 1 slice; Cereals, cold or cooked, 1/2 cup; Cornbread, 1 medium piece; Crackers, kasi, 2; Crackers, saltine, 4; Dinner roll, 1medium; English muffin, ½; Hamburger bun, ½; Muffin, 1 medium; Pancake, 1 medium; Pasta, 1/2 cup cooked; Alicia, 1/2 large or 1 small; Popcorn, 1 cup popped; Pretzels, 1 ounce; Rice, white, brown, or wild, 1/2 cup cooked; Tortillas, corn or flour, 1 medium; Waffle, 1 medium; Wheat germ, 1/4 cup; \par Vegetables are rich sources of potassium, magnesium, and fiber. One serving is 1/2 cup of any of the following cooked vegetables:\par Asparagus, Beans (green, yellow), Beets, Broccoli, [place] Sprouts, Carrots, Cauliflower, [place], chicory, mustard and turnip (and other) greens, Corn, Kale, Lima beans, Mixed vegetables, Okra, Parsnips, Peas, green, Potatoes (1/2 medium or 1/2 cup mashed), Pumpkin, Rut[place], Spaghetti or tomato sauce, Spinach, Squash (zucchini or yellow), Stewed tomatoes, [place], Sweet potatoes, Turnips, Yam \par Raw vegetables: Carrots,1/2 cup; Celery, 1/2 cup; Lettuce (mana, loose-leaf, green-leaf), 1 cup; Peppers, 1/2 cup; Spinach, 1 cup; Tomato, 1/2\par Fruits and fruit juices are important sources of potassium and magnesium. Fruits also contain fiber and are low in sodium and fat. One serving equals:\par Any fruit juice, # cup (6 ounces); Canned or frozen fruit, ½ cup (includes applesauce); Dried fruit, ¼ cup; \par Fresh fruit:\par Apple, 1 medium; Apricots, 2 medium; Banana, 1 medium; Berries, 1/2 cup; Melon, 1 wedge, or 1/2 cup; Cherries, 10; Grapefruit, 1/2; Grapes, 15; Kiwi, 1 medium; [place], 1/2 small; Nectarine, 1 medium; Orange, 1 medium; Peach, 1 medium; Pear, 1 medium; Pineapple, 1/2 cup; Plums, 2 medium; Tangerine, 1 large\par Dairy foods provide protein and calcium. Use low-fat or nonfat dairy products to cut down on fat. One serving equals:\par Skim milk, 1 cup (8 ounces); 1% low fat milk, 1 cup (8 ounces); 2% low fat milk, 1 cup (8 ounces) nonfat dry milk powder (1/3 cup); Low-fat cottage cheese, 1 cup (8 ounces); Parmesan cheese, 1 tablespoon; Mozzarella cheese, part skim, 1/4 cup (1 ounce); Low-fat cheddar cheese, 11/2 ounces; Ricotta cheese, part skim milk or nonfat, 1/4 cup (11/2 ounces); Other low fat or nonfat cheeses (11/2 oz.); Low-fat or nonfat yogurt, fruit-flavored or plain, 1 cup (8 ounces)\par Low-fat or nonfat frozen yogurt, 1/2 cup (4 ounces); Note: People who can't digest dairy products can try taking lactase enzyme pills or drops (available at drug and grocery stores) when they eat dairy. There is also milk available with the enzyme already added. Or you can buy lactose-free milk.\par Meat, poultry, and fish are good sources of protein and magnesium. One serving equals:\par Lean meat including beef, veal, or pork, 3 ounces cooked; Skinless, white meat poultry including turkey, chicken, 3 ounces; Fish and shellfish, 3 ounces cooked; Low-fat luncheon meats, 1 ounce; Egg, 1 medium; Tofu, 3 ounces\par Note: Three ounces of cooked meat is about the size of a deck of cards.\par Nuts, seeds, and legumes are rich sources of energy, magnesium, potassium, protein and fiber. Nuts and seeds are also high in fat, so portions should be small.\par Almonds, 1/3 cup; Beans such as kidney, wilson, and navy, 1/2 cup cooked; Chickpeas and lentils, 1/2 cup cooked; Cashews, 1/3 cup; Filberts, 1/3 cup; Mixed nuts, 1/3 cup; Peanut butter, 2 tablespoons; Peanuts, 1/3 cup; Sesame seeds, 2 tablespoons; Sunflower seeds, 2 tablespoons; Tofu, regular, 3 ounces; Walnuts, 1/3 cup \par Following the above diet will give you about 27% fat in your diet. The goal is to have 30% or less of the calories you eat each day be from fat. To meet that goal, do not eat more than 2-3 servings daily of added fat. Also try to limit sweets. One serving equals:\par Butter or margarine, 1 teaspoon; Mayonnaise, 1 teaspoon; Low-fat mayonnaise, 1 tablespoon; Salad dressing, 1 tablespoon; Low-fat salad dressing, 2 tablespoons; Oil, 1 teaspoon (use olive, canola, safflower, or other vegetable oils); Candy, hard, 3 pieces; Jelly or jam, 1 tablespoon); Jell-O, 1/2 cup; Jelly beans, 1/2 ounce; Maple syrup, 1 tablespoon; Popsicle, 1; Sherbet or nonfat or low-fat frozen yogurt, 1/2 cup; Sugar, 1 tablespoon; Sugared lemonade or fruit punch, 1 cup (8 ounces); Note: Try diet fruit-flavored gelatin or frozen, canned, or fresh fruit for dessert.\par \par Small amounts of alcohol may have benefits to the heart and blood pressure. However, excess use of alcohol can cause damage to the brain, liver and other organs. It can lead to high blood pressure. Drinking more than two drinks (15 ml) every day can raise your blood pressure. 15 ml of alcohol equals: \par • one 12-ounce bottle of beer \par • a half glass (5 ounces) of wine \par • 1 ounce (one shot) of 100 proof hard liquor\par \par 3.  thyroid cancer  fu with endocrinologist thyroglobulin <1 tsh 0.354\par 4.  atrial fib stable and stopped atorvastatin since she was having tendonitis and thought it was related but  even after she stopped she continued to have the pain in both feet  chol 162 hdl 65 ldl 81 and triglycerides 85\par

## 2021-04-13 NOTE — PHYSICAL EXAM
[No Acute Distress] : no acute distress [Well Nourished] : well nourished [Well Developed] : well developed [Well-Appearing] : well-appearing [Normal Sclera/Conjunctiva] : normal sclera/conjunctiva [PERRL] : pupils equal round and reactive to light [EOMI] : extraocular movements intact [Normal Outer Ear/Nose] : the outer ears and nose were normal in appearance [Normal Oropharynx] : the oropharynx was normal [No JVD] : no jugular venous distention [No Lymphadenopathy] : no lymphadenopathy [Supple] : supple [Thyroid Normal, No Nodules] : the thyroid was normal and there were no nodules present [No Respiratory Distress] : no respiratory distress  [No Accessory Muscle Use] : no accessory muscle use [Clear to Auscultation] : lungs were clear to auscultation bilaterally [Normal Rate] : normal rate  [Regular Rhythm] : with a regular rhythm [Normal S1, S2] : normal S1 and S2 [No Varicosities] : no varicosities [Pedal Pulses Present] : the pedal pulses are present [No Edema] : there was no peripheral edema [No Extremity Clubbing/Cyanosis] : no extremity clubbing/cyanosis [Soft] : abdomen soft [Non Tender] : non-tender [Non-distended] : non-distended [No Masses] : no abdominal mass palpated [No HSM] : no HSM [Normal Bowel Sounds] : normal bowel sounds [Normal Posterior Cervical Nodes] : no posterior cervical lymphadenopathy [Normal Anterior Cervical Nodes] : no anterior cervical lymphadenopathy [No CVA Tenderness] : no CVA  tenderness [No Spinal Tenderness] : no spinal tenderness [No Joint Swelling] : no joint swelling [Grossly Normal Strength/Tone] : grossly normal strength/tone [No Rash] : no rash [Coordination Grossly Intact] : coordination grossly intact [No Focal Deficits] : no focal deficits [Normal Gait] : normal gait [Deep Tendon Reflexes (DTR)] : deep tendon reflexes were 2+ and symmetric [Normal Affect] : the affect was normal [Normal Insight/Judgement] : insight and judgment were intact [Comprehensive Foot Exam Normal] : Right and left foot were examined and both feet are normal. No ulcers in either foot. Toes are normal and with full ROM.  Normal tactile sensation with monofilament testing throughout both feet

## 2021-04-13 NOTE — COUNSELING
[Potential consequences of obesity discussed] : Potential consequences of obesity discussed [Benefits of weight loss discussed] : Benefits of weight loss discussed [Structured Weight Management Program suggested:] : Structured weight management program suggested [Encouraged to maintain food diary] : Encouraged to maintain food diary [Encouraged to increase physical activity] : Encouraged to increase physical activity [Encouraged to use exercise tracking device] : Encouraged to use exercise tracking device [Weigh Self Weekly] : weigh self weekly [Decrease Portions] : decrease portions [____ min/wk Activity] : [unfilled] min/wk activity [Keep Food Diary] : keep food diary [FreeTextEntry1] : low [place]

## 2021-04-13 NOTE — HEALTH RISK ASSESSMENT
[No] : No [No falls in past year] : Patient reported no falls in the past year [0] : 2) Feeling down, depressed, or hopeless: Not at all (0) [] : No [de-identified] : ent  [de-identified] : regular  [de-identified] : diabetic

## 2021-04-13 NOTE — HISTORY OF PRESENT ILLNESS
[FreeTextEntry1] : fu  [de-identified] : Pt has chest discomfort and occurs after eating after she had the injection of covid vaccine .  She has completed her vaccines. She states her hgbA1c 6.8 % and is being managed by her  endocrinolgoist  -denies any headaches, nausea, vomiting, fever, chills, sweats, chest pain, chest pressure, diarrhea, constipation, dysphagia, sour taste in the mouth, dizziness, leg swelling, leg pain, myalgias, arthralgias, itchy eyes, itchy ears, heartburn, or reflux.\par \par \par

## 2021-05-08 ENCOUNTER — RX RENEWAL (OUTPATIENT)
Age: 56
End: 2021-05-08

## 2021-05-17 ENCOUNTER — APPOINTMENT (OUTPATIENT)
Dept: ELECTROPHYSIOLOGY | Facility: CLINIC | Age: 56
End: 2021-05-17
Payer: COMMERCIAL

## 2021-05-17 ENCOUNTER — NON-APPOINTMENT (OUTPATIENT)
Age: 56
End: 2021-05-17

## 2021-05-17 PROCEDURE — 93298 REM INTERROG DEV EVAL SCRMS: CPT

## 2021-05-17 PROCEDURE — G2066: CPT

## 2021-06-14 ENCOUNTER — APPOINTMENT (OUTPATIENT)
Dept: CARDIOLOGY | Facility: CLINIC | Age: 56
End: 2021-06-14
Payer: COMMERCIAL

## 2021-06-14 VITALS
HEIGHT: 67 IN | BODY MASS INDEX: 32.02 KG/M2 | DIASTOLIC BLOOD PRESSURE: 76 MMHG | SYSTOLIC BLOOD PRESSURE: 115 MMHG | TEMPERATURE: 98.7 F | OXYGEN SATURATION: 98 % | WEIGHT: 204 LBS | RESPIRATION RATE: 16 BRPM | HEART RATE: 87 BPM

## 2021-06-14 PROCEDURE — 99214 OFFICE O/P EST MOD 30 MIN: CPT

## 2021-06-14 PROCEDURE — 99072 ADDL SUPL MATRL&STAF TM PHE: CPT

## 2021-06-14 NOTE — ASSESSMENT
[FreeTextEntry1] : 56 yo F with DM, HTN, COVID-19 with pAF s/p DCCV and ILR placement 06/2020, with EF 55% by echo 04/2020, who presents for follow-up visit. \par \par 1. A fib: Patient reverted back to NSR, had ILR placed showing no further AF episodes for the past year since device placement. \par -We discussed that AFib is a progressive disease and it can recur; patient may need to be back on Eliquis in the future\par -She will continue to follow with EP \par \par 2. HTN: appears well controlled on amlodipine and losartan \par  \par 3. HLD: Patient's 10-year risk of atherosclerotic cardiovascular disease (ASCVD) as estimated by the pooled cohort equations is < 5%. Lipid panel shows LDL 81; this is borderline enough that patient wishes to remain off statin for now and focus on lifestyle modifications. \par \par FUV 6 months\par

## 2021-06-14 NOTE — HISTORY OF PRESENT ILLNESS
[FreeTextEntry1] : 56 yo F with HTN, HLD, T1DM, thyroid CA s/p resection and PEDRO treatment, (-) NST 12/2017, COVID-19 infection in 04/2020 during which she developed A fib with RVR s/p DCCV, s/p ILR placement 06/2020, with echocardiogram 04/2020 showing preserved EF, who presents for follow-up visit. \par \par Since her last visit, patient reports she has been doing well. Completed COVID vaccination series. Denies any chest pain, dyspnea, palpitations, lightheadedness, or syncope. Denies LE edema, orthopnea, or PND. Patient reports compliance with all medications, denies adverse effects.\par \par

## 2021-06-21 ENCOUNTER — APPOINTMENT (OUTPATIENT)
Dept: ELECTROPHYSIOLOGY | Facility: CLINIC | Age: 56
End: 2021-06-21
Payer: COMMERCIAL

## 2021-06-21 ENCOUNTER — NON-APPOINTMENT (OUTPATIENT)
Age: 56
End: 2021-06-21

## 2021-06-21 PROCEDURE — G2066: CPT

## 2021-06-21 PROCEDURE — 93298 REM INTERROG DEV EVAL SCRMS: CPT

## 2021-07-08 ENCOUNTER — APPOINTMENT (OUTPATIENT)
Dept: INTERNAL MEDICINE | Facility: CLINIC | Age: 56
End: 2021-07-08

## 2021-07-09 ENCOUNTER — APPOINTMENT (OUTPATIENT)
Dept: ELECTROPHYSIOLOGY | Facility: CLINIC | Age: 56
End: 2021-07-09
Payer: COMMERCIAL

## 2021-07-09 VITALS — DIASTOLIC BLOOD PRESSURE: 79 MMHG | RESPIRATION RATE: 14 BRPM | HEART RATE: 88 BPM | SYSTOLIC BLOOD PRESSURE: 123 MMHG

## 2021-07-09 PROCEDURE — 93285 PRGRMG DEV EVAL SCRMS IP: CPT

## 2021-07-09 PROCEDURE — 99072 ADDL SUPL MATRL&STAF TM PHE: CPT

## 2021-07-09 RX ORDER — GLUCAGON 3 MG/1
3 POWDER NASAL
Qty: 1 | Refills: 0 | Status: DISCONTINUED | COMMUNITY
Start: 2020-10-08 | End: 2021-07-09

## 2021-08-09 ENCOUNTER — APPOINTMENT (OUTPATIENT)
Dept: ELECTROPHYSIOLOGY | Facility: CLINIC | Age: 56
End: 2021-08-09
Payer: COMMERCIAL

## 2021-08-09 ENCOUNTER — NON-APPOINTMENT (OUTPATIENT)
Age: 56
End: 2021-08-09

## 2021-08-09 PROCEDURE — G2066: CPT

## 2021-08-09 PROCEDURE — 93298 REM INTERROG DEV EVAL SCRMS: CPT

## 2021-09-10 ENCOUNTER — APPOINTMENT (OUTPATIENT)
Dept: OTOLARYNGOLOGY | Facility: CLINIC | Age: 56
End: 2021-09-10
Payer: COMMERCIAL

## 2021-09-10 VITALS
RESPIRATION RATE: 15 BRPM | SYSTOLIC BLOOD PRESSURE: 104 MMHG | HEIGHT: 67 IN | OXYGEN SATURATION: 97 % | WEIGHT: 200 LBS | DIASTOLIC BLOOD PRESSURE: 71 MMHG | TEMPERATURE: 98.2 F | HEART RATE: 86 BPM | BODY MASS INDEX: 31.39 KG/M2

## 2021-09-10 DIAGNOSIS — Z86.79 PERSONAL HISTORY OF OTHER DISEASES OF THE CIRCULATORY SYSTEM: ICD-10-CM

## 2021-09-10 DIAGNOSIS — R06.5 DISTURBANCES OF SALIVARY SECRETION: ICD-10-CM

## 2021-09-10 DIAGNOSIS — K11.7 DISTURBANCES OF SALIVARY SECRETION: ICD-10-CM

## 2021-09-10 DIAGNOSIS — Z85.850 PERSONAL HISTORY OF MALIGNANT NEOPLASM OF THYROID: ICD-10-CM

## 2021-09-10 DIAGNOSIS — Z83.49 FAMILY HISTORY OF OTHER ENDOCRINE, NUTRITIONAL AND METABOLIC DISEASES: ICD-10-CM

## 2021-09-10 PROCEDURE — 31575 DIAGNOSTIC LARYNGOSCOPY: CPT

## 2021-09-10 PROCEDURE — 99214 OFFICE O/P EST MOD 30 MIN: CPT | Mod: 25

## 2021-09-10 RX ORDER — INSULIN ASPART 100 [IU]/ML
100 INJECTION, SOLUTION INTRAVENOUS; SUBCUTANEOUS
Refills: 0 | Status: ACTIVE | COMMUNITY

## 2021-09-10 NOTE — HISTORY OF PRESENT ILLNESS
[de-identified] : Janae is a 47 Y/O female who had undergone a partial thyroidectomy and subsequent completion thyroidectomy in Oct 2004 and PEDRO remnant ablation.  She had been followed by Dulce Maria Rodriguez, her endocrinologist with [facility]. Her weight has been stable, no voice changes other than mild raspiness, and denied SOB or new neck mass. On her last US imaging of the neck, a round, hypoechoic,  level III node was identified without a fatty hilum and suspicious for metastatic cancer but measured only  7 x 6 x 5mm. Her thyroglobulin has remained low and Tg antibodies are negative. \par  [FreeTextEntry1] : Janae is being monitored after a completion total thyroidectomy and I -131 remnant ablation in 2004.  She had a borderline rounded left level III node in 2013 that did not change on subsequent US exams and there were no cystic changes, or microcalcifications. On the report dated 03/15/18 there were no abnormal lymph nodes.  Again noted is a left level III lymph node that is rounded in appearance but stable when compared with the last prior ultrasound.  She is currently on Synthroid 200 mcg x 3 days and 175 x 4 days. Her TSH was suppressed TSH (0.25), undetectable thyroglobulin at less than 0.20, thyroglobulin antibody at less than 20. She had a COVID infection in March 2020 and a brief period of A Fib so her LT4 dose lowered by Dr. Rodriguez, 175 mcg x 4 days, 200 mcgs x 3 days.  She is being followed Dulce Maria Rodriguez MD.  Her weight has increased slightly after COVID -19 infection with mild symptoms in March and developed A Fib in April and started Eliquis x 3 months and now has a loop implant and monitored off anticoagulation since last September.  She is now on Amlodipine and Losartan for HTN.  She has no new palpable masses in her neck, she denies dysphagia, shortness of breath, neck pain or pressure.  Her voice has not changed.  She was treated for severe GERD and better now off antacids. Her last neck ultrasound was done on 01/13/2021.  This showed no evidence of recurrent thyroid cancer or change in the previously described non-enlarged and morphologically normal lymph nodes.  She now complains of dry mouth and thick saliva for the past 5 weeks not thought to be related to starting Losartan 6 months ago. She is not dehydrated. She denies dry eyes.  Her dryness is worse in the am.  She snores but not aware if she is a mouth breather.  She denies excessive daytime sleepiness.  She is not using any inhalers. She denies fever, body aches, cough, cyanosis, chest burning, anosmia or recent known COVID exposures.  All family members at home are well. She is vaccinated.

## 2021-09-10 NOTE — REASON FOR VISIT
[FreeTextEntry2] : followup thyroidectomy with completion in October 2004 and a new complaint of dry mouth and snoring for 5 weeks.  [FreeTextEntry1] : Endocrinologist and referrer is Dulce Maria Rodriguez MD,  PCP is Kevin Yi MD,  [facility] [place] [address] [place]

## 2021-09-10 NOTE — PROCEDURE
[Image(s) Captured] : image(s) captured and filed [Unable to Cooperate with Mirror] : patient unable to cooperate with mirror [Gag Reflex] : gag reflex preventing mirror examination [Topical Lidocaine] : topical lidocaine [Oxymetazoline HCl] : oxymetazoline HCl [Flexible Endoscope] : examined with the flexible endoscope [Serial Number: ___] : Serial Number: [unfilled] [de-identified] : The nasal septum is minimally deviated to the left. There are no masses or polyps and the nasal mucosa and secretions are normal. The choanae and posterior nasopharynx are normal without masses or drainage. The Eustachian tube orifices appear patent. The pharynx, including the posterior and lateral pharyngeal walls, the vallecula and base of tongue are normal without ulcerations, lesions or masses. The hypopharynx including the pyriform sinuses open well without pooling of secretions, mucosal lesions or masses. The supraglottic larynx including the epiglottis, petiole, glossoepiglottic folds and pharyngoepiglottic folds are normal without mucosal lesions, ulcerations or masses. The glottis reveals normal false vocal folds. The true vocal folds are glistening white, tense and of equal length, without paralysis, having symmetric mobility on adduction and abduction. There are no mucosal lesions and normal mucosa but with tiny stable mid anterior bilateral vocal fold swellings in the moore's node position.  The posterior cricoid area has healthy pink mucosa in the interarytenoid area and esophageal inlet. There is mild thickening/edema and tiger striping of the interarytenoid mucosa but no erythema. The trachea is clear without narrowing, deviation or lesions. \par  [de-identified] : papillary thyroid carcinoma and cervical lymph nodes being monitored.  Dry mouth and snoring.

## 2021-09-10 NOTE — CONSULT LETTER
[Dear  ___] : Dear  [unfilled], [Consult Letter:] : I had the pleasure of evaluating your patient, [unfilled]. [Please see my note below.] : Please see my note below. [Consult Closing:] : Thank you very much for allowing me to participate in the care of this patient.  If you have any questions, please do not hesitate to contact me. [Sincerely,] : Sincerely, [Jorge Louis M.D., FACS, [facility]] : Jorge Louis M.D., FACS, [facility] [Director, [facility]] : Director, [facility] [[facility]] : [facility] [[facility]] : [facility]  [Certified in Neck Ultrasound/ ECNU & AIUM] : Certified in Neck Ultrasound/ ECNU & AIUM [] :  [Otolaryngology/Head & Neck Surgery] : Otolaryngology/Head & Neck Surgery [[facility]] : [facility] [FreeTextEntry3] : \par Jorge Louis M.D., FACS, ECNU\par Director [facility]\par [facility]\par Certified in Thyroid/Parathyroid/Neck Ultrasound, ECNU/ AIUM\par \par , Department of Otolaryngology\par [facility]\par

## 2021-09-13 ENCOUNTER — APPOINTMENT (OUTPATIENT)
Dept: ELECTROPHYSIOLOGY | Facility: CLINIC | Age: 56
End: 2021-09-13
Payer: COMMERCIAL

## 2021-09-13 ENCOUNTER — NON-APPOINTMENT (OUTPATIENT)
Age: 56
End: 2021-09-13

## 2021-09-13 PROCEDURE — G2066: CPT

## 2021-09-13 PROCEDURE — 93298 REM INTERROG DEV EVAL SCRMS: CPT

## 2021-10-18 ENCOUNTER — APPOINTMENT (OUTPATIENT)
Dept: ELECTROPHYSIOLOGY | Facility: CLINIC | Age: 56
End: 2021-10-18
Payer: COMMERCIAL

## 2021-10-18 ENCOUNTER — NON-APPOINTMENT (OUTPATIENT)
Age: 56
End: 2021-10-18

## 2021-10-18 PROCEDURE — G2066: CPT | Mod: NC

## 2021-10-18 PROCEDURE — 93298 REM INTERROG DEV EVAL SCRMS: CPT

## 2021-11-22 ENCOUNTER — NON-APPOINTMENT (OUTPATIENT)
Age: 56
End: 2021-11-22

## 2021-11-22 ENCOUNTER — APPOINTMENT (OUTPATIENT)
Dept: ELECTROPHYSIOLOGY | Facility: CLINIC | Age: 56
End: 2021-11-22
Payer: COMMERCIAL

## 2021-11-22 PROCEDURE — 93298 REM INTERROG DEV EVAL SCRMS: CPT | Mod: NC

## 2021-11-22 PROCEDURE — G2066: CPT | Mod: NC

## 2021-11-30 ENCOUNTER — APPOINTMENT (OUTPATIENT)
Dept: INTERNAL MEDICINE | Facility: CLINIC | Age: 56
End: 2021-11-30
Payer: COMMERCIAL

## 2021-11-30 VITALS
OXYGEN SATURATION: 98 % | WEIGHT: 202 LBS | DIASTOLIC BLOOD PRESSURE: 77 MMHG | SYSTOLIC BLOOD PRESSURE: 119 MMHG | BODY MASS INDEX: 32.47 KG/M2 | HEIGHT: 66 IN | HEART RATE: 92 BPM | TEMPERATURE: 97 F

## 2021-11-30 DIAGNOSIS — R51.9 HEADACHE, UNSPECIFIED: ICD-10-CM

## 2021-11-30 DIAGNOSIS — J12.82 COVID-19: ICD-10-CM

## 2021-11-30 DIAGNOSIS — U07.1 COVID-19: ICD-10-CM

## 2021-11-30 DIAGNOSIS — Z87.898 PERSONAL HISTORY OF OTHER SPECIFIED CONDITIONS: ICD-10-CM

## 2021-11-30 DIAGNOSIS — R07.89 OTHER CHEST PAIN: ICD-10-CM

## 2021-11-30 DIAGNOSIS — E89.0 POSTPROCEDURAL HYPOTHYROIDISM: ICD-10-CM

## 2021-11-30 DIAGNOSIS — R59.9 ENLARGED LYMPH NODES, UNSPECIFIED: ICD-10-CM

## 2021-11-30 DIAGNOSIS — R91.1 SOLITARY PULMONARY NODULE: ICD-10-CM

## 2021-11-30 PROCEDURE — 93000 ELECTROCARDIOGRAM COMPLETE: CPT

## 2021-11-30 PROCEDURE — 99396 PREV VISIT EST AGE 40-64: CPT | Mod: 25

## 2021-11-30 RX ORDER — LEVOTHYROXINE SODIUM 175 UG/1
175 TABLET ORAL
Refills: 0 | Status: ACTIVE | COMMUNITY
Start: 2021-11-30

## 2021-11-30 RX ORDER — LEVOTHYROXINE SODIUM 200 UG/1
200 TABLET ORAL
Qty: 90 | Refills: 0 | Status: ACTIVE | COMMUNITY

## 2021-11-30 NOTE — HISTORY OF PRESENT ILLNESS
[de-identified] : Pt is a 56 yrold woman with hx of diabetes type 1, hld, paroxysmal atrial ca , thyroid cancer, reflux , and hypertension who is here for her cpe.

## 2021-11-30 NOTE — PHYSICAL EXAM
[Well Developed] : well developed [Well Nourished] : well nourished [Conjunctiva] : the conjunctiva were normal in both eyes [PERRL] : pupils were equal in size, round, and reactive to light [EOM Intact] : extraocular movements were intact [Normal Outer Ear/Nose] : the outer ears and nose were normal in appearance [Normal Oropharynx] : the oropharynx was normal [Normal TMs] : both tympanic membranes were normal [Normal Nasal Mucosa] : the nasal mucosa was normal [Normal Appearance] : was normal in appearance [Neck Supple] : was supple [Absent] : was absent [Rate ___] : at [unfilled] breaths per minute [Normal Rhythm/Effort] : normal respiratory rhythm and effort [Clear Bilaterally] : the lungs were clear to auscultation bilaterally [Normal to Percussion] : the lungs were normal to percussion [5th Left ICS - MCL] : palpated at the 5th LICS in the midclavicular line [Heart Rate ___] : [unfilled] bpm [Normal Rate] : normal [Normal S1] : normal S1 [Normal S2] : normal S2 [No Murmur] : no murmurs heard [No Pitting Edema] : no pitting edema present [2+] : left 2+ [No Abnormalities] : the abdominal aorta was not enlarged and no bruit was heard [Examination Of The Breasts] : a normal appearance [No Discharge] : no discharge [Soft, Nontender] : the abdomen was soft and nontender [No Mass] : no masses were palpated [No HSM] : no hepatosplenomegaly noted [Abdomen Hernia Umbilical] : an umbilical hernia was present [] : which was reducible [No Lymphangitis] : no lymphangitis observed [Normal Kyphosis] : normal kyphosis [No Visual Abnormalities] : no visible abnormalities [Normal Lordosis] : normal lordosis [No Scoliosis] : no scoliosis [No Tenderness to Palpation] : no spine tenderness on palpation [No Masses] : no masses [Full ROM] : full ROM [No Pain with ROM] : no pain with motion in any direction [Intact] : all reflexes within normal limits bilaterally [Normal Station and Gait] : the gait and station were normal [Normal Motor Tone] : the muscle tone was normal [Involuntary Movements] : no involuntary movements were seen [Normal Scalp] : inspection of the scalp showed no abnormalities [Examination Of The Hair] : texture and distribution of hair was normal [Complexion Medium] : medium complexion [Normal] : the deep tendon reflexes were normal [Normal Mental Status] : the patient's orientation, memory, attention, language and fund of knowledge were normal [Appropriate] : appropriate [Comprehensive Foot Exam Normal] : Right and left foot were examined and both feet are normal. No ulcers in either foot. Toes are normal and with full ROM.  Normal tactile sensation with monofilament testing throughout both feet [Normal Verbal Skills] : a deficiency in verbal communication skills was noted [Normal Nonverbal Skills] : deficient nonverbal communication skills were noted [Enlarged Diffusely] : was not enlarged [JVP Elevated ___cm] : the JVP was not elevated [S3] : no S3 [S4] : no S4 [Rt] : no varicose veins of the right leg [Lt] : no varicose veins of the left leg [Right Carotid Bruit] : no bruit heard over the right carotid [Left Carotid Bruit] : no bruit heard over the left carotid [Right Femoral Bruit] : no bruit heard over the right femoral artery [Left Femoral Bruit] : no bruit heard over the left femoral artery [Bruit] : no bruit heard [Postauricular Lymph Nodes Enlarged Bilaterally] : nodes not enlarged [Preauricular Lymph Nodes Enlarged Bilaterally] : nodes not enlarged [Submandibular Lymph Nodes Enlarged Bilaterally] : nodes not enlarged [Suboccipital Lymph Nodes Enlarged Bilaterally] : nodes not enlarged [Submental Lymph Nodes Enlarged] : nodes not enlarged [Cervical Lymph Nodes Enlarged Posterior Bilaterally] : nodes not enlarged [Cervical Lymph Nodes Enlarged Anterior Bilaterally] : nodes not enlarged [Supraclavicular Lymph Nodes Enlarged Bilaterally] : nodes not enlarged [Axillary Lymph Nodes Enlarged Bilaterally] : nodes not enlarged [Epitrochlear Lymph Nodes Enlarged Bilaterally] : nodes not enlarged [Femoral Lymph Nodes Enlarged Bilaterally] : nodes not enlarged [Inguinal Lymph Nodes Enlarged Bilaterally] : nodes not enlarged [Tattoo - Single] : no tattoos observed [Impaired judgment] : intact judgment [Impaired Insight] : intact insight [de-identified] : tongue normal  teeth in good repair

## 2021-11-30 NOTE — ASSESSMENT
[FreeTextEntry1] : health   she is up to d ate with gyn mammogram bone density  and vaccines  She needs colon cancer screening since she has exposure to wtc.  she is up to date with eye and dental exams \par 2 bmi 32  risks of obesity and need for diet and eating healthy Obesity is a complex disorder involving an excessive amount of body fat. Obesity isn't just a cosmetic concern. It increases your risk of diseases and health problems, such as heart disease, diabetes and high blood pressure.\par \par Being extremely obese means you are especially likely to have health problems related to your weight.\par \par \par The good news is that even modest weight loss can improve or prevent the health problems associated with obesity. Dietary changes, increased physical activity and behavior changes can help you lose weight. Prescription medications and weight-loss surgery are additional options for treating obesity.\par \par \par \par \par Symptoms\par \par Obesity is diagnosed when your body mass index (BMI) is 30 or higher. Your body mass index is calculated by dividing your weight in kilograms (kg) by your height in meters (m) squared. \par \par \par BMI\par \par Weight status\par \par \par Below 18.5 Underweight \par 18.5-24.9 Normal \par 25.0-29.9 Overweight \par 30.0-34.9 Obese (Class I) \par 35.0-39.9 Obese (Class II) \par 40.0 and higher Extreme obesity (Class III) \par \par For most people, BMI provides a reasonable estimate of body fat. However, BMI doesn't directly measure body fat, so some people, such as muscular athletes, may have a BMI in the obese category even though they don't have excess body fat. Ask your doctor if your BMI is a problem. \par \par When to see a doctor\par \par If you think you may be obese, and especially if you're concerned about weight-related health problems, see your doctor or health care provider. You and your provider can evaluate your health risks and discuss your weight-loss options. \par \par \par \par Causes\par \par Although there are genetic, behavioral and hormonal influences on body weight, obesity occurs when you take in more calories than you burn through exercise and normal daily activities. Your body stores these excess calories as fat.\par \par Obesity can sometimes be traced to a medical cause, such as Prader-Willi syndrome, Cushing's syndrome, and other diseases and conditions. However, these disorders are rare and, in general, the principal causes of obesity are:\par •Inactivity. If you're not very active, you don't burn as many calories. With a sedentary lifestyle, you can easily take in more calories every day than you use through exercise and normal daily activities.\par •Unhealthy diet and eating habits. Weight gain is inevitable if you regularly eat more calories than you burn. And most Americans' diets are too high in calories and are full of fast food and high-calorie beverages.\par \par Risk factors\par \par Obesity usually results from a combination of causes and contributing factors, including:\par •Genetics. Your genes may affect the amount of body fat you store, and where that fat is distributed. Genetics may also play a role in how efficiently your body converts food into energy and how your body burns calories during exercise.\par •Family lifestyle. Obesity tends to run in families. If one or both of your parents are obese, your risk of being obese is increased. That's not just because of genetics. Family members tend to share similar eating and activity habits.\par •Inactivity. If you're not very active, you don't burn as many calories. With a sedentary lifestyle, you can easily take in more calories every day than you burn through exercise and routine daily activities. Having medical problems, such as arthritis, can lead to decreased activity, which contributes to weight gain.\par •Unhealthy diet. A diet that's high in calories, lacking in fruits and vegetables, full of fast food, and laden with high-calorie beverages and oversized portions contributes to weight gain.\par •Medical problems. In some people, obesity can be traced to a medical cause, such as Prader-Willi syndrome, Cushing's syndrome and other conditions. Medical problems, such as arthritis, also can lead to decreased activity, which may result in weight gain.\par •Certain medications. Some medications can lead to weight gain if you don't compensate through diet or activity. These medications include some antidepressants, anti-seizure medications, diabetes medications, antipsychotic medications, steroids and beta blockers.\par •Social and economic issues. Research has linked social and economic factors to obesity. Avoiding obesity is difficult if you don't have safe areas to exercise. Similarly, you may not have been taught healthy ways of cooking, or you may not have money to buy healthier foods. In addition, the people you spend time with may influence your weight — you're more likely to become obese if you have obese friends or relatives.\par •Age. Obesity can occur at any age, even in young children. But as you age, hormonal changes and a less active lifestyle increase your risk of obesity. In addition, the amount of muscle in your body tends to decrease with age. This lower muscle mass leads to a decrease in metabolism. These changes also reduce calorie needs, and can make it harder to keep off excess weight. If you don't consciously control what you eat and become more physically active as you age, you'll likely gain weight.\par •Pregnancy. During pregnancy, a woman's weight necessarily increases. Some women find this weight difficult to lose after the baby is born. This weight gain may contribute to the development of obesity in women.\par •Quitting smoking. Quitting smoking is often associated with weight gain. And for some, it can lead to enough weight gain that the person becomes obese. In the long run, however, quitting smoking is still a greater benefit to your health than continuing to smoke.\par •Lack of sleep. Not getting enough sleep or getting too much sleep can cause changes in hormones that increase your appetite. You may also crave foods high in calories and carbohydrates, which can contribute to weight gain.\par \par Even if you have one or more of these risk factors, it doesn't mean that you're destined to become obese. You can counteract most risk factors through diet, physical activity and exercise, and behavior changes.\par \par Complications\par \par If you're obese, you're more likely to develop a number of potentially serious health problems, including:\par •High triglycerides and low high-density lipoprotein (HDL) cholesterol\par •Type 2 diabetes\par •High blood pressure\par •Metabolic syndrome — a combination of high blood sugar, high blood pressure, high triglycerides and low HDL cholesterol\par •Heart disease\par •Stroke\par •Cancer, including cancer of the uterus, cervix, endometrium, ovaries, breast, colon, rectum, esophagus, liver, gallbladder, pancreas, kidney and prostate\par •Breathing disorders, including sleep apnea, a potentially serious sleep disorder in which breathing repeatedly stops and starts\par •Gallbladder disease\par •Gynecological problems, such as infertility and irregular periods\par •Erectile dysfunction and sexual health issues\par •Nonalcoholic fatty liver disease, a condition in which fat builds up in the liver and can cause inflammation or scarring\par •Osteoarthritis\par \par Quality of life\par \par When you're obese, your overall quality of life may be diminished. You may not be able to do things you used to do, such as participating in enjoyable activities. You may avoid public places. Obese people may even encounter discrimination.\par \par Other weight-related issues that may affect your quality of life include:\par •Depression\par •Disability\par •Sexual problems\par •Shame and guilt\par •Social isolation\par •Lower work achievement\par \par Prevention\par \par Whether you're at risk of becoming obese, currently overweight or at a healthy weight, you can take steps to prevent unhealthy weight gain and related health problems. Not surprisingly, the steps to prevent weight gain are the same as the steps to lose weight: daily exercise, a healthy diet, and a long-term commitment to watch what you eat and drink.\par •Exercise regularly. You need to get 150 to 300 minutes of moderate-intensity activity a week to prevent weight gain. Moderately intense physical activities include fast walking and swimming.\par •Follow a healthy eating plan. Focus on low-calorie, nutrient-dense foods, such as fruits, veg\par 3.  dm  She is being fu with endocrinology and has insulin pump.   ---The following has been discussed:---\par -Targets for weight and HgA1c have been discussed with patient \par -FS goals have been reviewed with the patient in detail:\par AM <130 post meal<160-180\par -Diet and weight goals have been discussed with the patient in detail.\par -The importance of exercise in the treatment of diabetes has been discussed \par with the patient in detail.\par -Extensive dietary advice provided to patient and the need to avoid concentrated \par sweets/simple carbohydrates and to ensure to consume protein with each meal. \par -Patient instructed to limit carbohydrates to 60 gms per meal and 15 gms per \par snacks. \par -Patient to keep a blood sugar log to check fasting and before meals\par -Patient instructed on importance of daily feet inspection and to reports any \par open lesions to physician promptly\par 4.  htn well controlled continue present management and  \par DIETARY SALT (SODIUM); DASH DIET AND BLOOD PRESSURE:\par To decrease the sodium in your diet: \par · Use fresh vegetables and foods as much as possible.\par · Avoid canned and processed foods. Cured meats such as sinha, ham, and sausages are high in salt.\par · Try using different herbs and spices in your cooking instead of salt.\par · In restaurants, avoid foods with sauces, cheese, and cured meats. Ask for low-sodium choices.\par To get more potassium in your diet, eat:\par · Bananas, fresh or dried apricots, peaches, citrus fruits, melons\par · Cauliflower, broccoli, tomatoes, carrots, raw spinach, beet greens, potatoes\par To get more magnesium in your diet, eat:\par · Whole grain foods, leafy green vegetables, dried fruits\par • Fish and seafood, poultry \par To get more calcium in your diet, eat:\par · Nonfat milk, yogurt, and low-fat cheeses \par · Marathon and sardines\par · Cooked dried beans\par · Broccoli, kale, and bok jose r\par · Tofu or soybean curd\par DASH stands for "dietary approaches to stop hypertension." The DASH diet is low in total and saturated fat. It is rich in fruits, vegetables, and low-fat dairy foods. The diet allows you to get natural fiber, calcium, and magnesium from food. It prevents or lowers high blood pressure. It can also help lower cholesterol in your blood. \par Don't change how you eat all at once. It's much more likely that you'll succeed if you make only one or two small changes at a time. Wait until those changes are a habit, then make a couple more changes. Some good starting steps include: \par Add one serving of vegetables to your meals at lunch and dinner. This is an easy way to help you get more vegetables in your diet. \par Have a piece of fruit as an afternoon or after-dinner snack. One glass of juice at breakfast is not enough fruit in your diet. \par Use half your usual amount of butter, margarine, or salad dressing. \par Buy nonfat salad dressing or nonfat sour cream.\par Follow this guide to select your menu of meals. The number of calories we want you to eat each day will tell you how many servings you can choose from each food group.\par Calories: 1600 2100 2600 3100 Servings Grains 6 7 ½ 10 ½ 12 ½ Vegetables 	 4 4 ½ 5 6 Fruit 4 5 5 6 Dairy (low-fat) 2 ½ 3 3 3 ½ Meat, poultry, fish ½ 1 ½ 2 2 ½ Nuts, seeds ½ ½ ½ 1 Fats and sweets 1 ½ 2 ½ 3 4\par Grains and grain products like breads and cereals provide energy, fiber and vitamins. Whole grains have more of these nutrients. One serving equals one of the following:\par Bagel, 1/2 medium; Barley, cooked 1/2 cup; Biscuit, country style 1 medium; Bread, whole wheat, white 1 slice; Cereals, cold or cooked, 1/2 cup; Cornbread, 1 medium piece; Crackers, kasi, 2; Crackers, saltine, 4; Dinner roll, 1medium; English muffin, ½; Hamburger bun, ½; Muffin, 1 medium; Pancake, 1 medium; Pasta, 1/2 cup cooked; Alicia, 1/2 large or 1 small; Popcorn, 1 cup popped; Pretzels, 1 ounce; Rice, white, brown, or wild, 1/2 cup cooked; Tortillas, corn or flour, 1 medium; Waffle, 1 medium; Wheat germ, 1/4 cup; \par Vegetables are rich sources of potassium, magnesium, and fiber. One serving is 1/2 cup of any of the following cooked vegetables:\par Asparagus, Beans (green, yellow), Beets, Broccoli, [place] Sprouts, Carrots, Cauliflower, [place], chicory, mustard and turnip (and other) greens, Corn, Kale, Lima beans, Mixed vegetables, Okra, Parsnips, Peas, green, Potatoes (1/2 medium or 1/2 cup mashed), Pumpkin, Rutabaga, Spaghetti or tomato sauce, Spinach, Squash (zucchini or yellow), Stewed tomatoes, Succotash, Sweet potatoes, Turnips, Yam \par Raw vegetables: Carrots,1/2 cup; Celery, 1/2 cup; Lettuce (mana, loose-leaf, green-leaf), 1 cup; Peppers, 1/2 cup; Spinach, 1 cup; Tomato, 1/2\par Fruits and fruit juices are important sources of potassium and magnesium. Fruits also contain fiber and are low in sodium and fat. One serving equals:\par Any fruit juice, # cup (6 ounces); Canned or frozen fruit, ½ cup (includes applesauce); Dried fruit, ¼ cup; \par Fresh fruit:\par Apple, 1 medium; Apricots, 2 medium; Banana, 1 medium; Berries, 1/2 cup; Melon, 1 wedge, or 1/2 cup; Cherries, 10; Grapefruit, 1/2; Grapes, 15; Kiwi, 1 medium; Luis, 1/2 small; Nectarine, 1 medium; Orange, 1 medium; Peach, 1 medium; Pear, 1 medium; Pineapple, 1/2 cup; Plums, 2 medium; Tangerine, 1 large\par Dairy foods provide protein and calcium. Use low-fat or nonfat dairy products to cut down on fat. One serving equals:\par Skim milk, 1 cup (8 ounces); 1% low fat milk, 1 cup (8 ounces); 2% low fat milk, 1 cup (8 ounces) nonfat dry milk powder (1/3 cup); Low-fat cottage cheese, 1 cup (8 ounces); Parmesan cheese, 1 tablespoon; Mozzarella cheese, part skim, 1/4 cup (1 ounce); Low-fat cheddar cheese, 11/2 ounces; Ricotta cheese, part skim milk or nonfat, 1/4 cup (11/2 ounces); Other low fat or nonfat cheeses (11/2 oz.); Low-fat or nonfat yogurt, fruit-flavored or plain, 1 cup (8 ounces)\par Low-fat or nonfat frozen yogurt, 1/2 cup (4 ounces); Note: People who can't digest dairy products can try taking lactase enzyme pills or drops (available at drug and grocery stores) when they eat dairy. There is also milk available with the enzyme already added. Or you can buy lactose-free milk.\par Meat, poultry, and fish are good sources of protein and magnesium. One serving equals:\par Lean meat including beef, veal, or pork, 3 ounces cooked; Skinless, white meat poultry including turkey, chicken, 3 ounces; Fish and shellfish, 3 ounces cooked; Low-fat luncheon meats, 1 ounce; Egg, 1 medium; Tofu, 3 ounces\par Note: Three ounces of cooked meat is about the size of a deck of cards.\par Nuts, seeds, and legumes are rich sources of energy, magnesium, potassium, protein and fiber. Nuts and seeds are also high in fat, so portions should be small.\par Almonds, 1/3 cup; Beans such as kidney, wilson, and navy, 1/2 cup cooked; Chickpeas and lentils, 1/2 cup cooked; Cashews, 1/3 cup; Filberts, 1/3 cup; Mixed nuts, 1/3 cup; Peanut butter, 2 tablespoons; Peanuts, 1/3 cup; Sesame seeds, 2 tablespoons; Sunflower seeds, 2 tablespoons; Tofu, regular, 3 ounces; Walnuts, 1/3 cup \par Following the above diet will give you about 27% fat in your diet. The goal is to have 30% or less of the calories you eat each day be from fat. To meet that goal, do not eat more than 2-3 servings daily of added fat. Also try to limit sweets. One serving equals:\par Butter or margarine, 1 teaspoon; Mayonnaise, 1 teaspoon; Low-fat mayonnaise, 1 tablespoon; Salad dressing, 1 tablespoon; Low-fat salad dressing, 2 tablespoons; Oil, 1 teaspoon (use olive, canola, safflower, or other vegetable oils); Candy, hard, 3 pieces; Jelly or jam, 1 tablespoon); Jell-O, 1/2 cup; Jelly beans, 1/2 ounce; Maple syrup, 1 tablespoon; Popsicle, 1; Sherbet or nonfat or low-fat frozen yogurt, 1/2 cup; Sugar, 1 tablespoon; Sugared lemonade or fruit punch, 1 cup (8 ounces); Note: Try diet fruit-flavored gelatin or frozen, canned, or fresh fruit for dessert.\par \par Small amounts of alcohol may have benefits to the heart and blood pressure. However, excess use of alcohol can cause damage to the brain, liver and other organs. It can lead to high blood pressure. Drinking more than two drinks (15 ml) every day can raise your blood pressure. 15 ml of alcohol equals: \par • one 12-ounce bottle of beer \par • a half glass (5 ounces) of wine \par • 1 ounce (one shot) of 100 proof hard liquor\par \par \par 5. thyroid cancer is fu by ent and endocrinologist recent change in thyroid meds needs fu  in end of dec.  \par 6.  colon cancer screening -  WE discussed procedure and will return prior for blood testing and instruction.  Colon and rectal cancer screening is a way in which doctors check the colon and rectum for signs of cancer or growths (called polyps) that might become cancer. It is done in people who have no symptoms and no reason to think they have cancer. The goal is to find and remove polyps before they become cancer, or to find cancer early, before it grows, spreads, or causes problems.\par \par The colon and rectum are the last part of the digestive tract (figure 1). When doctors talk about colon and rectal cancer screening, they use the term "colorectal." That is just a shorter way of saying "colon and rectal." It's also possible to say just colon cancer screening.\par \par Studies show that having colon cancer screening lowers the chance of dying from colon cancer. There are several different types of screening test that can do this.\par \par What are the different screening tests for colon cancer? — They include:\par \par ?Colonoscopy – Colonoscopy allows the doctor to see directly inside the entire colon. Before you can have a colonoscopy, you must clean out your colon. You do this at home by drinking a special liquid that causes watery diarrhea for several hours. On the day of the test, you get medicine to help you relax. Then a doctor puts a thin tube into your anus and advances it into your colon (figure 2). The tube has a tiny camera attached to it, so the doctor can see inside your colon. The tube also has tiny tools on the end, so the doctor can remove pieces of tissue or polyps if they are there. After polyps or pieces of tissue are removed, they are sent to a lab to be checked for cancer.\par \par \par •Advantages of this test – Colonoscopy finds most small polyps and almost all large polyps and cancers. If found, polyps can be removed right away. This test gives the most accurate results. If any other screening tests are done first and come back positive, a colonoscopy will need to be done for follow-up. If you have a colonoscopy as your first test, you will probably not need a second follow-up test soon after.\par \par \par •Drawbacks to this test – Colonoscopy has some small risks. It can cause bleeding or tear the inside of the colon, but this only happens in 1 out of 1,000 people. Also, cleaning out the bowel beforehand can be unpleasant. Plus, people usually cannot work or drive for the rest of the day after the test, because of the relaxation medicine they must take during the test.\par \par \par Sigmoidoscopy – A sigmoidoscopy is similar to a colonoscopy. The difference is that this test looks only at the last part of the colon, and a colonoscopy looks at the whole colon. Before you have a sigmoidoscopy, you must clean out the lower part of your colon using an enema. This bowel cleaning is not as thorough or unpleasant as the one for colonoscopy. For this test, you do not need to take medicines to help you relax, so you can drive and work afterward if you want.\par \par \par •Advantages of this test – Sigmoidoscopy can find polyps and cancers in the rectum and the last part of the colon. If polyps are found, they can be removed right away.\par \par \par •Drawbacks to this test – In about 2 out of 10,000 people, sigmoidoscopy tears the inside of the colon. The test also can't find polyps or cancers that are in the part of the colon the test does not view (figure 3). If doctors find polyps or cancer during a sigmoidoscopy, they usually follow up with a colonoscopy.\par \par \par CT colonography (also known as virtual colonoscopy or CTC) – CTC looks for cancer and polyps using a special X-ray called a "CT scan." For most CTC tests, the preparation is the same as it is for colonoscopy.\par \par \par •Advantages of this test – CTC can find polyps and cancers in the whole colon without the need for medicines to relax.\par \par \par •Drawbacks to this test – If doctors find polyps or cancer with CTC, they usually follow up with a colonoscopy. CTC sometimes finds areas that look abnormal but that turn out to be healthy. This means that CTC can lead to tests and procedures you did not need. Plus, CTC exposes you to radiation. In most cases, the preparation needed to clean the bowel is the same as the one needed for a colonoscopy. The test is expensive, and some insurance companies might not cover this test for screening.\par \par \par Stool test for blood – "Stool" is another word for bowel movements. Stool tests most commonly check for blood in samples of stool. Cancers and polyps can bleed, and if they bleed around the time you do the stool test, then blood will show up on the test. The test can find even small amounts of blood that you can't see in your stool. Other less serious conditions can also cause small amounts of blood in the stool, and that will show up in this test. You will have to collect small samples from your bowel movements, which you will put in a special container you get from your doctor or nurse. Then you follow the instructions to mail the container out for the testing.\par \par \par •Advantages of this test – This test does not involve cleaning out the colon or having any procedures.\par \par \par •Drawbacks to this test – Stool tests are less likely to find polyps than other screening tests. These tests also often come up abnormal even in people who do not have cancer. If a stool test shows something abnormal, doctors usually follow up with a colonoscopy.\par \par \par Stool DNA test – The stool DNA test checks for genetic markers of cancer, as well as for signs of blood. For this test, you get a special kit in order to collect a whole bowel movement. Then you follow the instructions about how and where to ship it.\par \par \par •Advantages of this test – This test does not involve cleaning out the colon or having any procedures. When cancer is not present, it is less likely to be falsely abnormal than a stool test for blood. That means it leads to fewer unnecessary colonoscopies.\par \par \par •Drawbacks to this test – It might be unpleasant to collect and ship a whole bowel movement. If a DNA test shows something abnormal, doctors usually follow up with a colonoscopy.\par \par \par There is no blood test that most experts think is accurate enough to use for screening.\par \par How do I choose which test to have? — Work with your doctor or nurse to decide which test is best for you. Some doctors might choose to combine screening tests, for example, sigmoidoscopy plus stool testing for blood. Being screened–no matter how–is more important than which test you choose.\par \par Who should be screened for colon cancer? — Doctors recommend that most people begin having colon cancer screening at age 50. People who have an increased risk of getting colon cancer sometimes begin screening at a younger age. That might include people with a strong family history of colon cancer, and people with diseases of the colon called "Crohn's disease" and "ulcerative colitis."\par \par Most people can stop being screened around the age of 75, or at the latest 85.\par \par How often should I be screened? — That depends on your risk of colon cancer and which test you have. People who have a high risk of colon cancer often need to be tested more often and should have a colonoscopy.\par \par Most people are not at high risk, so they can choose one of these schedules:\par \par Colonoscopy every 10 years\par \par CT colonography (CTC) every 5 years\par \par Sigmoidoscopy every 5 to 10 years\par \par Stool testing for blood once a year\par \par Stool DNA testing every 3 years (but doctors are not yet sure of the best time frame)\par \par 9 atrial fibrillation  she has loop recorder which is being removed  next month.  \par \par

## 2021-11-30 NOTE — HEALTH RISK ASSESSMENT
[Very Good] : ~his/her~  mood as very good [No] : In the past 12 months have you used drugs other than those required for medical reasons? No [No falls in past year] : Patient reported no falls in the past year [0] : 2) Feeling down, depressed, or hopeless: Not at all (0) [PHQ-2 Negative - No further assessment needed] : PHQ-2 Negative - No further assessment needed [Patient reported mammogram was normal] : Patient reported mammogram was normal [Patient reported PAP Smear was normal] : Patient reported PAP Smear was normal [Patient reported bone density results were normal] : Patient reported bone density results were normal [Patient reported colonoscopy was normal] : Patient reported colonoscopy was normal [HIV test declined] : HIV test declined [Hepatitis C test offered] : Hepatitis C test offered [None] : None [# of Members in Household ___] :  household currently consist of [unfilled] member(s) [Unemployed] : unemployed [College] : College [] :  [# Of Children ___] : has [unfilled] children [Sexually Active] : sexually active [Feels Safe at Home] : Feels safe at home [Fully functional (bathing, dressing, toileting, transferring, walking, feeding)] : Fully functional (bathing, dressing, toileting, transferring, walking, feeding) [Fully functional (using the telephone, shopping, preparing meals, housekeeping, doing laundry, using] : Fully functional and needs no help or supervision to perform IADLs (using the telephone, shopping, preparing meals, housekeeping, doing laundry, using transportation, managing medications and managing finances) [Smoke Detector] : smoke detector [Carbon Monoxide Detector] : carbon monoxide detector [Safety elements used in home] : safety elements used in home [Seat Belt] :  uses seat belt [Sunscreen] : uses sunscreen [FreeTextEntry1] : none  [] : No [de-identified] : ent dr Louis  [de-identified] : Pilates and exercises regularly  [de-identified] : diabetic diet  [[id number]] : 0 [Change in mental status noted] : No change in mental status noted [Language] : denies difficulty with language [Behavior] : denies difficulty with behavior [Learning/Retaining New Information] : denies difficulty learning/retaining new information [Handling Complex Tasks] : denies difficulty handling complex tasks [Reasoning] : denies difficulty with reasoning [Spatial Ability and Orientation] : denies difficulty with spatial ability and orientation [Reports changes in hearing] : Reports no changes in hearing [Reports changes in vision] : Reports no changes in vision [Reports changes in dental health] : Reports no changes in dental health [Guns at Home] : no guns at home [Travel to Developing Areas] : does not  travel to developing areas [TB Exposure] : is not being exposed to tuberculosis [MammogramDate] : 6/30/21 [PapSmearDate] : 4months ago  [BoneDensityDate] : 6./2021 [ColonoscopyDate] : 2015 [de-identified] : last eye exam 6months ago  [de-identified] : last week  [AdvancecareDate] : 11/21

## 2021-11-30 NOTE — COUNSELING
[Fall prevention counseling provided] : Fall prevention counseling provided [Adequate lighting] : Adequate lighting [No throw rugs] : No throw rugs [Use proper foot wear] : Use proper foot wear [Use recommended devices] : Use recommended devices [Sleep ___ hours/day] : Sleep [unfilled] hours/day [Engage in a relaxing activity] : Engage in a relaxing activity [Plan in advance] : Plan in advance [Potential consequences of obesity discussed] : Potential consequences of obesity discussed [Benefits of weight loss discussed] : Benefits of weight loss discussed [Structured Weight Management Program suggested:] : Structured weight management program suggested [Encouraged to maintain food diary] : Encouraged to maintain food diary [Encouraged to increase physical activity] : Encouraged to increase physical activity [Encouraged to use exercise tracking device] : Encouraged to use exercise tracking device [Weigh Self Weekly] : weigh self weekly [Decrease Portions] : decrease portions [____ min/wk Activity] : [unfilled] min/wk activity [Keep Food Diary] : keep food diary [Target Wt Loss Goal ___] : Weight Loss Goals: Target weight loss goal [unfilled] lbs [FreeTextEntry1] : diabetic  [FreeTextEntry2] : ideal  134- 154  she is 89  bmi 32

## 2021-12-01 LAB
25(OH)D3 SERPL-MCNC: 43.9 NG/ML
ALBUMIN SERPL ELPH-MCNC: 4.2 G/DL
ALP BLD-CCNC: 54 U/L
ALT SERPL-CCNC: 17 U/L
ANION GAP SERPL CALC-SCNC: 13 MMOL/L
APPEARANCE: CLEAR
AST SERPL-CCNC: 18 U/L
BASOPHILS # BLD AUTO: 0.04 K/UL
BASOPHILS NFR BLD AUTO: 0.9 %
BILIRUB SERPL-MCNC: 0.4 MG/DL
BILIRUBIN URINE: NEGATIVE
BLOOD URINE: NEGATIVE
BUN SERPL-MCNC: 11 MG/DL
CALCIUM SERPL-MCNC: 9.2 MG/DL
CHLORIDE SERPL-SCNC: 101 MMOL/L
CHOLEST SERPL-MCNC: 180 MG/DL
CO2 SERPL-SCNC: 23 MMOL/L
COLOR: NORMAL
CREAT SERPL-MCNC: 0.54 MG/DL
CREAT SPEC-SCNC: 18 MG/DL
EOSINOPHIL # BLD AUTO: 0.17 K/UL
EOSINOPHIL NFR BLD AUTO: 3.7 %
ESTIMATED AVERAGE GLUCOSE: 160 MG/DL
FRUCTOSAMINE SERPL-MCNC: 306 UMOL/L
GLUCOSE QUALITATIVE U: NEGATIVE
GLUCOSE SERPL-MCNC: 95 MG/DL
HBA1C MFR BLD HPLC: 7.2 %
HCT VFR BLD CALC: 41.8 %
HDLC SERPL-MCNC: 62 MG/DL
HGB BLD-MCNC: 13.4 G/DL
IMM GRANULOCYTES NFR BLD AUTO: 0.2 %
KETONES URINE: NEGATIVE
LDLC SERPL CALC-MCNC: 109 MG/DL
LEUKOCYTE ESTERASE URINE: NEGATIVE
LYMPHOCYTES # BLD AUTO: 1.47 K/UL
LYMPHOCYTES NFR BLD AUTO: 32.3 %
MAN DIFF?: NORMAL
MCHC RBC-ENTMCNC: 29.9 PG
MCHC RBC-ENTMCNC: 32.1 GM/DL
MCV RBC AUTO: 93.3 FL
MICROALBUMIN 24H UR DL<=1MG/L-MCNC: <1.2 MG/DL
MICROALBUMIN/CREAT 24H UR-RTO: NORMAL MG/G
MONOCYTES # BLD AUTO: 0.41 K/UL
MONOCYTES NFR BLD AUTO: 9 %
NEUTROPHILS # BLD AUTO: 2.45 K/UL
NEUTROPHILS NFR BLD AUTO: 53.9 %
NITRITE URINE: NEGATIVE
NONHDLC SERPL-MCNC: 118 MG/DL
PH URINE: 6.5
PLATELET # BLD AUTO: 384 K/UL
POTASSIUM SERPL-SCNC: 4.1 MMOL/L
PROT SERPL-MCNC: 7.2 G/DL
PROTEIN URINE: NEGATIVE
RBC # BLD: 4.48 M/UL
RBC # FLD: 13.7 %
SODIUM SERPL-SCNC: 137 MMOL/L
SPECIFIC GRAVITY URINE: 1
TRIGL SERPL-MCNC: 43 MG/DL
UROBILINOGEN URINE: NORMAL
WBC # FLD AUTO: 4.55 K/UL

## 2021-12-01 RX ORDER — AZITHROMYCIN 250 MG/1
250 TABLET, FILM COATED ORAL
Qty: 6 | Refills: 0 | Status: COMPLETED | COMMUNITY
Start: 2021-08-30

## 2021-12-07 ENCOUNTER — OUTPATIENT (OUTPATIENT)
Dept: OUTPATIENT SERVICES | Facility: HOSPITAL | Age: 56
LOS: 1 days | Discharge: HOME | End: 2021-12-07
Payer: COMMERCIAL

## 2021-12-07 DIAGNOSIS — Z95.818 PRESENCE OF OTHER CARDIAC IMPLANTS AND GRAFTS: Chronic | ICD-10-CM

## 2021-12-07 DIAGNOSIS — E89.0 POSTPROCEDURAL HYPOTHYROIDISM: Chronic | ICD-10-CM

## 2021-12-07 LAB — GLUCOSE BLDC GLUCOMTR-MCNC: 134 MG/DL — HIGH (ref 70–99)

## 2021-12-07 PROCEDURE — 33285 INSJ SUBQ CAR RHYTHM MNTR: CPT

## 2021-12-07 RX ORDER — LEVOTHYROXINE SODIUM 125 MCG
1 TABLET ORAL
Qty: 0 | Refills: 0 | DISCHARGE

## 2021-12-07 RX ORDER — AMLODIPINE BESYLATE 2.5 MG/1
1 TABLET ORAL
Qty: 0 | Refills: 0 | DISCHARGE

## 2021-12-07 RX ORDER — LOSARTAN POTASSIUM 100 MG/1
1 TABLET, FILM COATED ORAL
Qty: 0 | Refills: 0 | DISCHARGE

## 2021-12-07 RX ORDER — INSULIN ASPART 100 [IU]/ML
0 INJECTION, SOLUTION SUBCUTANEOUS
Qty: 0 | Refills: 0 | DISCHARGE

## 2021-12-07 RX ORDER — INSULIN LISPRO 100/ML
0 VIAL (ML) SUBCUTANEOUS
Qty: 0 | Refills: 0 | DISCHARGE

## 2021-12-07 NOTE — H&P CARDIOLOGY - NSICDXPASTSURGICALHX_GEN_ALL_CORE_FT
PAST SURGICAL HISTORY:  H/O thyroidectomy     Status post placement of implantable loop recorder 7/2020

## 2021-12-07 NOTE — H&P CARDIOLOGY - NSICDXPASTMEDICALHX_GEN_ALL_CORE_FT
PAST MEDICAL HISTORY:  COVID-19 4/2020    Diabetes type 1, controlled     HTN (hypertension)     Hypothyroid     Hypothyroid     Paroxysmal atrial fibrillation with DCCV

## 2021-12-07 NOTE — H&P CARDIOLOGY - REVIEW OF SYSTEMS
Patient denies SOB, palpitations, dizziness, presyncope, syncope,  headache, visual disturbances, CVA, PE, DVT, abdominal pain, N/V/D/C, hematochezia, melena, dysuria, hematuria, fever, chills.

## 2021-12-07 NOTE — H&P CARDIOLOGY - NSICDXFAMILYHX_GEN_ALL_CORE_FT
FAMILY HISTORY:  Father  Still living? Unknown  Family history of atrial fibrillation, Age at diagnosis: Age Unknown

## 2021-12-07 NOTE — H&P CARDIOLOGY - HISTORY OF PRESENT ILLNESS
56 year old female with HTN, hypothyroid s/p thyroidectomy for thyroid CA and radiation, asthma (never intubated), GERD, type I DM with insulin pump, COVID+ in 4/2020, paroxysmal atrial fibrillation,  with DCCV 4/2020, s/p ILR implantation on 7/23/20 for long term monitoring of suspected recurrent afib, no evidence of A Fib on loop recorder and  no longer on Eliquis since  presented today for ILR explant.    The process of the procedures along with the risk and benefits for the procedure were explained in detail which included but not limited to bleeding, infection. Patient expressed understanding an all questions were answered.   Patient denies SOB, palpitations, dizziness, presyncope, syncope,  headache, visual disturbances, CVA, PE, DVT, abdominal pain, N/V/D/C, hematochezia, melena, dysuria, hematuria, fever, chills.

## 2021-12-08 ENCOUNTER — APPOINTMENT (OUTPATIENT)
Dept: INTERNAL MEDICINE | Facility: CLINIC | Age: 56
End: 2021-12-08
Payer: COMMERCIAL

## 2021-12-08 VITALS
OXYGEN SATURATION: 98 % | WEIGHT: 202 LBS | TEMPERATURE: 97.5 F | DIASTOLIC BLOOD PRESSURE: 80 MMHG | HEART RATE: 84 BPM | HEIGHT: 66 IN | SYSTOLIC BLOOD PRESSURE: 125 MMHG | BODY MASS INDEX: 32.47 KG/M2

## 2021-12-08 DIAGNOSIS — Z91.89 OTHER SPECIFIED PERSONAL RISK FACTORS, NOT ELSEWHERE CLASSIFIED: ICD-10-CM

## 2021-12-08 PROCEDURE — 99213 OFFICE O/P EST LOW 20 MIN: CPT

## 2021-12-08 RX ORDER — ALBUTEROL SULFATE 90 UG/1
108 (90 BASE) INHALANT RESPIRATORY (INHALATION)
Qty: 1 | Refills: 2 | Status: COMPLETED | COMMUNITY
Start: 2020-04-21 | End: 2021-12-08

## 2021-12-08 NOTE — HISTORY OF PRESENT ILLNESS
[Heartburn] : denies heartburn [Nausea] : denies nausea [Vomiting] : denies vomiting [Diarrhea] : denies diarrhea [Constipation] : denies constipation [Yellow Skin Or Eyes (Jaundice)] : denies jaundice [Abdominal Pain] : denies abdominal pain [Abdominal Swelling] : denies abdominal swelling [Rectal Pain] : denies rectal pain [Malignancy] : malignancy [Abdominal Surgery] : abdominal surgery [Wt Gain ___ Lbs] : no recent weight gain [Wt Loss ___ Lbs] : no recent weight loss [GERD] : no gastroesophageal reflux disease [Hiatus Hernia] : no hiatus hernia [Peptic Ulcer Disease] : no peptic ulcer disease [Pancreatitis] : no pancreatitis [Cholelithiasis] : no cholelithiasis [Kidney Stone] : no kidney stone [Inflammatory Bowel Disease] : no inflammatory bowel disease [Irritable Bowel Syndrome] : no irritable bowel syndrome [Diverticulitis] : no diverticulitis [Alcohol Abuse] : no alcohol abuse [Appendectomy] : no appendectomy [Cholecystectomy] : no cholecystectomy [de-identified] : Pt is a 56 yrold woman with hx of diabetes type 1, hld, paroxysmal atrial ca , thyroid cancer, reflux , and hypertension who is here for her preop and colon cancer screening  She is scheduled for a colonoscopy on Dec 17 . Pt states her mother h ad polyps and pt is at [facility] for colon cancer .  She has normal bm and no rectal bleeding or abdominal pain.  she had a loop recorder and it was removed.    She -denies any headaches, nausea, vomiting, fever, chills, sweats, chest pain, chest pressure, diarrhea, constipation, dysphagia, sour taste in the mouth, dizziness, leg swelling, leg pain, myalgias, arthralgias, itchy eyes, itchy ears, heartburn, or reflux.\par She has occ acid reflux  and takes Pepcid ac if needed.  No black stools \par \par \par

## 2021-12-08 NOTE — ASSESSMENT
[FreeTextEntry1] : 1 colon cancer screening  Colon and rectal cancer screening is a way in which doctors check the colon and rectum for signs of cancer or growths (called polyps) that might become cancer. It is done in people who have no symptoms and no reason to think they have cancer. The goal is to find and remove polyps before they become cancer, or to find cancer early, before it grows, spreads, or causes problems.\par \par The colon and rectum are the last part of the digestive tract (figure 1). When doctors talk about colon and rectal cancer screening, they use the term "colorectal." That is just a shorter way of saying "colon and rectal." It's also possible to say just colon cancer screening.\par \par Studies show that having colon cancer screening lowers the chance of dying from colon cancer. There are several different types of screening test that can do this.\par \par What are the different screening tests for colon cancer? — They include:\par \par ?Colonoscopy – Colonoscopy allows the doctor to see directly inside the entire colon. Before you can have a colonoscopy, you must clean out your colon. You do this at home by drinking a special liquid that causes watery diarrhea for several hours. On the day of the test, you get medicine to help you relax. Then a doctor puts a thin tube into your anus and advances it into your colon (figure 2). The tube has a tiny camera attached to it, so the doctor can see inside your colon. The tube also has tiny tools on the end, so the doctor can remove pieces of tissue or polyps if they are there. After polyps or pieces of tissue are removed, they are sent to a lab to be checked for cancer.\par \par \par •Advantages of this test – Colonoscopy finds most small polyps and almost all large polyps and cancers. If found, polyps can be removed right away. This test gives the most accurate results. If any other screening tests are done first and come back positive, a colonoscopy will need to be done for follow-up. If you have a colonoscopy as your first test, you will probably not need a second follow-up test soon after.\par \par \par •Drawbacks to this test – Colonoscopy has some small risks. It can cause bleeding or tear the inside of the colon, but this only happens in 1 out of 1,000 people. Also, cleaning out the bowel beforehand can be unpleasant. Plus, people usually cannot work or drive for the rest of the day after the test, because of the relaxation medicine they must take during the test.\par \par \par ?Sigmoidoscopy – A sigmoidoscopy is similar to a colonoscopy. The difference is that this test looks only at the last part of the colon, and a colonoscopy looks at the whole colon. Before you have a sigmoidoscopy, you must clean out the lower part of your colon using an enema. This bowel cleaning is not as thorough or unpleasant as the one for colonoscopy. For this test, you do not need to take medicines to help you relax, so you can drive and work afterward if you want.\par \par \par •Advantages of this test – Sigmoidoscopy can find polyps and cancers in the rectum and the last part of the colon. If polyps are found, they can be removed right away.\par \par \par •Drawbacks to this test – In about 2 out of 10,000 people, sigmoidoscopy tears the inside of the colon. The test also can't find polyps or cancers that are in the part of the colon the test does not view (figure 3). If doctors find polyps or cancer during a sigmoidoscopy, they usually follow up with a colonoscopy.\par \par \par ?CT colonography (also known as virtual colonoscopy or CTC) – CTC looks for cancer and polyps using a special X-ray called a "CT scan." For most CTC tests, the preparation is the same as it is for colonoscopy.\par \par \par •Advantages of this test – CTC can find polyps and cancers in the whole colon without the need for medicines to relax.\par \par \par •Drawbacks to this test – If doctors find polyps or cancer with CTC, they usually follow up with a colonoscopy. CTC sometimes finds areas that look abnormal but that turn out to be healthy. This means that CTC can lead to tests and procedures you did not need. Plus, CTC exposes you to radiation. In most cases, the preparation needed to clean the bowel is the same as the one needed for a colonoscopy. The test is expensive, and some insurance companies might not cover this test for screening.\par \par \par ?Stool test for blood – "Stool" is another word for bowel movements. Stool tests most commonly check for blood in samples of stool. Cancers and polyps can bleed, and if they bleed around the time you do the stool test, then blood will show up on the test. The test can find even small amounts of blood that you can't see in your stool. Other less serious conditions can also cause small amounts of blood in the stool, and that will show up in this test. You will have to collect small samples from your bowel movements, which you will put in a special container you get from your doctor or nurse. Then you follow the instructions to mail the container out for the testing.\par \par \par •Advantages of this test – This test does not involve cleaning out the colon or having any procedures.\par \par \par •Drawbacks to this test – Stool tests are less likely to find polyps than other screening tests. These tests also often come up abnormal even in people who do not have cancer. If a stool test shows something abnormal, doctors usually follow up with a colonoscopy.\par \par \par ?Stool DNA test – The stool DNA test checks for genetic markers of cancer, as well as for signs of blood. For this test, you get a special kit in order to collect a whole bowel movement. Then you follow the instructions about how and where to ship it.\par \par \par •Advantages of this test – This test does not involve cleaning out the colon or having any procedures. When cancer is not present, it is less likely to be falsely abnormal than a stool test for blood. That means it leads to fewer unnecessary colonoscopies.\par \par \par •Drawbacks to this test – It might be unpleasant to collect and ship a whole bowel movement. If a DNA test shows something abnormal, doctors usually follow up with a colonoscopy.\par \par \par There is no blood test that most experts think is accurate enough to use for screening.\par \par How do I choose which test to have? — Work with your doctor or nurse to decide which test is best for you. Some doctors might choose to combine screening tests, for example, sigmoidoscopy plus stool testing for blood. Being screened–no matter how–is more important than which test you choose.\par \par Who should be screened for colon cancer? — Doctors recommend that most people begin having colon cancer screening at age 50. People who have an increased risk of getting colon cancer sometimes begin screening at a younger age. That might include people with a strong family history of colon cancer, and people with diseases of the colon called "Crohn's disease" and "ulcerative colitis."\par \par Most people can stop being screened around the age of 75, or at the latest 85.\par \par How often should I be screened? — That depends on your risk of colon cancer and which test you have. People who have a high risk of colon cancer often need to be tested more often and should have a colonoscopy.\par \par Most people are not at high risk, so they can choose one of these schedules:\par \par ?Colonoscopy every 10 years\par \par ?CT colonography (CTC) every 5 years\par \par ?Sigmoidoscopy every 5 to 10 years\par \par ?Stool testing for blood once a year\par \par ?Stool DNA testing every 3 years (but doctors are not yet sure of the best time frame)\par 2.  preop Pt is having a low risk procedure and is intermediate risks for cardiac adverse outcome and cri is score  .  she was explained the procedures  colonoscopy, anesthesia   risks and complications alternatives , prep and post procedure care.  I have answered all her questions.  she will need hcg poct on wed or Thursday and covid testing on Tuesday.  I reviewed her labs and she can proceed with procedure  if the above is negative     ekg  rate88.min  Qrs 84ms qt 350/423ms pr 128ms p 104 ms   normal ecg  \par   medications    on day of procedure take with sip of water 3hrs prior  amlodipine losartan Synthroid   she will disconnect her insulin pump before procedure is to start  and will adjust her insulin on Thursday according to her blood sugar    The risks, benefits, and alternatives were explained in detail to the patient. Risks including but not limited to bleeding, perforation, adverse reaction to medications, cardiopulmonary compromise and their attendant sequalae explained. Sequelae including but not limited to need for surgery, colostomy, prolonged hospital stay, placement of drainage tubes, blood transfusions, disability, morbidity and mortality was explained. \par It has been made clear to the patient that although colonoscopy is still considered the best test to screen for colon cancer and polyps, no test is 100% accurate. He/she indicated understanding of the above and wishes to proceed. \par All questions and concerns have been addressed. \par \par  \par

## 2021-12-08 NOTE — PHYSICAL EXAM
[Well Developed] : well developed [Well Nourished] : well nourished [Conjunctiva] : the conjunctiva were normal in both eyes [PERRL] : pupils were equal in size, round, and reactive to light [EOM Intact] : extraocular movements were intact [Outer Ear] : the ears and nose were normal in appearance [Oropharynx] : the oropharynx was normal [Rate ___] : at [unfilled] breaths per minute [Normal Rhythm/Effort] : normal respiratory rhythm and effort [Clear Bilaterally] : the lungs were clear to auscultation bilaterally [Normal to Percussion] : the lungs were normal to percussion [5th Left ICS - MCL] : palpated at the 5th LICS in the midclavicular line [Normal Rate] : normal [Heart Rate ___] : [unfilled] bpm [Rhythm Regular] : regular [Normal S1] : normal S1 [Normal S2] : normal S2 [No Murmur] : no murmurs heard [No Pitting Edema] : no pitting edema present [2+] : left 2+ [No Abnormalities] : the abdominal aorta was not enlarged and no bruit was heard [Soft, Nontender] : the abdomen was soft and nontender [No Mass] : no masses were palpated [No HSM] : no hepatosplenomegaly noted [Cervical Lymph Nodes Enlarged Posterior Bilaterally] : posterior cervical [Cervical Lymph Nodes Enlarged Anterior Bilaterally] : anterior cervical [Supraclavicular Lymph Nodes Enlarged Bilaterally] : supraclavicular [Axillary Lymph Nodes Enlarged Bilaterally] : axillary [Femoral Lymph Nodes Enlarged Bilaterally] : femoral [Inguinal Lymph Nodes Enlarged Bilaterally] : inguinal [No CVA Tenderness] : no ~M costovertebral angle tenderness [No Spinal Tenderness] : no spinal tenderness [Normal Station and Gait] : the gait and station were normal [Normal] : motor strength was normal in all muscle groups [Normal Motor Tone] : the muscle tone was normal [Involuntary Movements] : no involuntary movements were seen [Skin Color & Pigmentation] : normal skin color and pigmentation [Skin Turgor] : normal skin turgor [] : no rash [Deep Tendon Reflexes (DTR)] : deep tendon reflexes were 2+ and symmetric [Sensation] : the sensory exam was normal to light touch and pinprick [No Focal Deficits] : no focal deficits [Oriented To Time, Place, And Person] : oriented to person, place, and time [Impaired Insight] : insight and judgment were intact [Affect] : the affect was normal [S3] : no S3 [S4] : no S4 [Right Carotid Bruit] : no bruit heard over the right carotid [Left Carotid Bruit] : no bruit heard over the left carotid [Right Femoral Bruit] : no bruit heard over the right femoral artery [Left Femoral Bruit] : no bruit heard over the left femoral artery

## 2021-12-14 ENCOUNTER — APPOINTMENT (OUTPATIENT)
Dept: DISASTER EMERGENCY | Facility: CLINIC | Age: 56
End: 2021-12-14

## 2021-12-15 LAB — SARS-COV-2 N GENE NPH QL NAA+PROBE: NOT DETECTED

## 2021-12-16 ENCOUNTER — RESULT CHARGE (OUTPATIENT)
Age: 56
End: 2021-12-16

## 2021-12-17 ENCOUNTER — OUTPATIENT (OUTPATIENT)
Dept: OUTPATIENT SERVICES | Facility: HOSPITAL | Age: 56
LOS: 1 days | End: 2021-12-17
Payer: COMMERCIAL

## 2021-12-17 ENCOUNTER — TRANSCRIPTION ENCOUNTER (OUTPATIENT)
Age: 56
End: 2021-12-17

## 2021-12-17 ENCOUNTER — APPOINTMENT (OUTPATIENT)
Dept: INTERNAL MEDICINE | Facility: HOSPITAL | Age: 56
End: 2021-12-17

## 2021-12-17 DIAGNOSIS — E89.0 POSTPROCEDURAL HYPOTHYROIDISM: Chronic | ICD-10-CM

## 2021-12-17 DIAGNOSIS — Z12.11 ENCOUNTER FOR SCREENING FOR MALIGNANT NEOPLASM OF COLON: ICD-10-CM

## 2021-12-17 DIAGNOSIS — Z95.818 PRESENCE OF OTHER CARDIAC IMPLANTS AND GRAFTS: Chronic | ICD-10-CM

## 2021-12-17 DIAGNOSIS — Z91.89 OTHER SPECIFIED PERSONAL RISK FACTORS, NOT ELSEWHERE CLASSIFIED: ICD-10-CM

## 2021-12-17 PROCEDURE — 45380 COLONOSCOPY AND BIOPSY: CPT

## 2021-12-17 PROCEDURE — G0105: CPT

## 2021-12-22 ENCOUNTER — APPOINTMENT (OUTPATIENT)
Dept: ELECTROPHYSIOLOGY | Facility: CLINIC | Age: 56
End: 2021-12-22

## 2021-12-27 ENCOUNTER — APPOINTMENT (OUTPATIENT)
Dept: CARDIOLOGY | Facility: CLINIC | Age: 56
End: 2021-12-27
Payer: COMMERCIAL

## 2021-12-27 VITALS
HEART RATE: 83 BPM | SYSTOLIC BLOOD PRESSURE: 107 MMHG | DIASTOLIC BLOOD PRESSURE: 73 MMHG | HEIGHT: 66 IN | BODY MASS INDEX: 32.47 KG/M2 | RESPIRATION RATE: 16 BRPM | WEIGHT: 202 LBS | OXYGEN SATURATION: 98 % | TEMPERATURE: 97.9 F

## 2021-12-27 LAB
T4 FREE SERPL-MCNC: 1.6 NG/DL
T4 SERPL-MCNC: 8.8 UG/DL
TSH SERPL-ACNC: 0.81 UIU/ML

## 2021-12-27 PROCEDURE — 99214 OFFICE O/P EST MOD 30 MIN: CPT

## 2021-12-27 NOTE — ASSESSMENT
[FreeTextEntry1] : 55 yo F with DM, HTN, COVID-19 with pAF s/p DCCV and ILR placement 06/2020 s/p explant, with EF 55% by echo 04/2020, who presents for follow-up visit. \par \par 1. A fib: ILR placed showing no further AF episodes for over 1.5 years since device placement. It was explanted by EP. \par -We discussed that AFib is a progressive disease and it can recur; risk of CVA is still there and she will monitor for symptoms such as palpitations but does not want to take prophylactic Eliquis. \par  \par 2. HTN: appears well controlled on amlodipine and losartan \par  \par 3. HLD: Per ACC guidelines, patient should be on a statin in setting of LDL greater than 70.  R/B/A discussed, patient agreeable to start low-dose statin.\par \par FUV 6-12 months or PRN\par

## 2021-12-27 NOTE — HISTORY OF PRESENT ILLNESS
[FreeTextEntry1] : 57 yo F with HTN, HLD, T1DM, thyroid CA s/p resection and PEDRO treatment, (-) NST 12/2017, COVID-19 infection in 04/2020 during which she developed A fib with RVR s/p DCCV, s/p ILR placement 06/2020 and explant 12/2021 (no events), with echocardiogram 04/2020 showing preserved EF, who presents for follow-up visit. \par \par Since her last visit, patient reports she has been doing well. Denies any chest pain, dyspnea, palpitations, lightheadedness, or syncope. Denies LE edema, orthopnea, or PND. Patient reports compliance with all medications, denies adverse effects.\par \par \par \par

## 2021-12-28 ENCOUNTER — APPOINTMENT (OUTPATIENT)
Dept: ELECTROPHYSIOLOGY | Facility: CLINIC | Age: 56
End: 2021-12-28
Payer: COMMERCIAL

## 2021-12-28 ENCOUNTER — NON-APPOINTMENT (OUTPATIENT)
Age: 56
End: 2021-12-28

## 2021-12-28 LAB
THYROGLOB AB SERPL-ACNC: <20 IU/ML
THYROGLOB SERPL-MCNC: <0.2 NG/ML

## 2021-12-28 PROCEDURE — G2066: CPT

## 2021-12-28 PROCEDURE — 93298 REM INTERROG DEV EVAL SCRMS: CPT

## 2022-01-25 ENCOUNTER — APPOINTMENT (OUTPATIENT)
Dept: OTOLARYNGOLOGY | Facility: CLINIC | Age: 57
End: 2022-01-25

## 2022-02-01 ENCOUNTER — APPOINTMENT (OUTPATIENT)
Dept: ELECTROPHYSIOLOGY | Facility: CLINIC | Age: 57
End: 2022-02-01

## 2022-04-06 ENCOUNTER — RX RENEWAL (OUTPATIENT)
Age: 57
End: 2022-04-06

## 2022-04-06 RX ORDER — AMLODIPINE BESYLATE 5 MG/1
5 TABLET ORAL DAILY
Qty: 90 | Refills: 3 | Status: ACTIVE | COMMUNITY
Start: 2020-01-15 | End: 1900-01-01

## 2022-04-11 PROBLEM — J04.0 REFLUX LARYNGITIS: Status: RESOLVED | Noted: 2018-10-02 | Resolved: 2020-12-15

## 2022-04-26 NOTE — ED ADULT TRIAGE NOTE - RESPIRATORY RATE (BREATHS/MIN)
Paged by MD at [facility] for questions and advice regarding further care for Carri. Provided MD the number to patient placement so that he may get in touch with the cardiologist on call.   
16

## 2022-05-11 NOTE — ED ADULT TRIAGE NOTE - BP NONINVASIVE SYSTOLIC (MM HG)
"Subjective:     CC:   Chief Complaint   Patient presents with   • Follow-Up     Medication and MRI results   • Anxiety   • Jaw Pain       HPI:   Rony presents today to discuss the following issues:    The patient is here to follow-up on cardiac event monitor and brain MRI results.  He would also like to discuss his anxiety and potential treatment options.  He is also reporting some left jaw/upper neck pain described as a tightening sensation.      Past Medical History:   Diagnosis Date   • Backache with radiation 2004   • Chronic right-sided thoracic back pain 7/23/2017   • Migraine headache without aura    • Tension headache 8/2/2017       Social History     Tobacco Use   • Smoking status: Never Smoker   • Smokeless tobacco: Never Used   Vaping Use   • Vaping Use: Never used   Substance Use Topics   • Alcohol use: Yes     Comment: socially   • Drug use: No       Current Outpatient Medications Ordered in Epic   Medication Sig Dispense Refill   • escitalopram (LEXAPRO) 5 MG tablet Take 1 Tablet by mouth every day. 30 Tablet 1   • hydrOXYzine HCl (ATARAX) 25 MG Tab Take 1 Tablet by mouth 3 times a day as needed for Anxiety. 30 Tablet 0   • Famotidine (PEPCID PO) Take  by mouth.     • rizatriptan (MAXALT) 5 MG tablet Take 1 Tab by mouth Once PRN for Migraine for up to 1 dose. 10 Tab 3     No current Epic-ordered facility-administered medications on file.       Allergies:  Patient has no known allergies.    Health Maintenance: Completed    ROS:   Denies any recent fevers or chills. No nausea or vomiting. No chest pains or shortness of breath.      Objective:       Exam:  /68 (BP Location: Right arm, Patient Position: Sitting, BP Cuff Size: Adult)   Pulse 96   Temp 36.5 °C (97.7 °F) (Temporal)   Resp 16   Ht 1.778 m (5' 10\")   Wt 67.1 kg (148 lb)   SpO2 99%   BMI 21.24 kg/m²  Body mass index is 21.24 kg/m².    Gen: Alert and oriented, No apparent distress.      Assessment & Plan:     41 y.o. male with the " following -     Anxiety  Panic attacks  Chronic medical condition.    Progressive.  The patient has had recurrent visits with complaints of a number of different physical symptoms involving a myriad of body systems.  He initially reported gastrointestinal symptoms consisting primarily of epigastric pain, decreased appetite, and nausea.  Abdominal x-ray on 1/5/2022 showed no evidence of obstruction, but did show moderate amount of stool.  CT abdomen and pelvis on 1/26/2022 was unremarkable.  H. pylori breath testing was negative.  The patient was referred to gastroenterology and scheduled for an EGD.  The patient then developed palpitations, subsequent cardiac event monitor on 5/6/2022 showed predominantly sinus rhythm.  He then reported a constellation of neurological symptoms including progressive numbness and tingling in his bilateral arms, bilateral lower extremity numbness, tingling, and weakness, chronic headaches, as well as vertigo and visual disturbances.  Cervical spine x-ray on 4/18/2022 was normal.  Lumbar spine x-ray on 2/16/2022 was unremarkable.  MRI of the brain to assess for MS on 5/10/2022 was unremarkable.  Labs, including CBC, CMP, ESR, CRP, vitamin D, B12, PARVEZ, RF, CCP, CK have all been unremarkable.  The patient initially had a slightly elevated free T4 of 1.72 with a normal TSH of 1.58 on 1/19/2022.  Repeat thyroid labs on 2/18/2022 were normal.  Discussed with the patient that he has had a number of somatic complaints involving multiple organ systems for which extensive evaluation has been unremarkable.  He acknowledges a hypersensitivity to symptoms which then creates a snowballing effect of anxiety and panic attacks.  He agrees with the possibility that his symptoms could be psychosomatic related to an underlying anxiety.  He is currently in psychotherapy.  His therapist agrees with the decision to initiate an antianxiety medication. Will start a low-dose trial of escitalopram 5 mg daily for  therapeutic as well as diagnostic purposes  - escitalopram (LEXAPRO) 5 MG tablet; Take 1 Tablet by mouth every day.  Dispense: 30 Tablet; Refill: 1    Paresthesias  Vertigo  Blurry vision, bilateral  Nonintractable episodic headache, unspecified headache type  Chronic medical conditions.  Stable.  The patient previously reported a constellation of neurological symptoms including progressive numbness and tingling in his bilateral arms (associated with bilateral shoulder pain), bilateral lower extremity numbness, tingling, and weakness, chronic headaches, as well as vertigo and visual disturbances.  Cervical spine x-ray on 4/18/2022 was normal.  MRI of the brain on 5/10/2022 was unremarkable.    Palpitations  Chronic medical condition.    Stable. Cardiac event monitor on 5/6/2022 showed predominantly sinus rhythm with an average heart rate of 82, range , rare PACs, rare PVCs.       Return in about 4 weeks (around 6/8/2022) for new antidepressant.    Please note that this dictation was created using voice recognition software. I have made every reasonable attempt to correct obvious errors, but I expect that there are errors of grammar and possibly content that I did not discover before finalizing the note.       138

## 2022-06-02 ENCOUNTER — OUTPATIENT (OUTPATIENT)
Dept: OUTPATIENT SERVICES | Facility: HOSPITAL | Age: 57
LOS: 1 days | End: 2022-06-02
Payer: COMMERCIAL

## 2022-06-02 ENCOUNTER — APPOINTMENT (OUTPATIENT)
Dept: CT IMAGING | Facility: IMAGING CENTER | Age: 57
End: 2022-06-02
Payer: COMMERCIAL

## 2022-06-02 DIAGNOSIS — Z95.818 PRESENCE OF OTHER CARDIAC IMPLANTS AND GRAFTS: Chronic | ICD-10-CM

## 2022-06-02 DIAGNOSIS — E89.0 POSTPROCEDURAL HYPOTHYROIDISM: Chronic | ICD-10-CM

## 2022-06-02 DIAGNOSIS — Z00.8 ENCOUNTER FOR OTHER GENERAL EXAMINATION: ICD-10-CM

## 2022-06-02 DIAGNOSIS — R91.1 SOLITARY PULMONARY NODULE: ICD-10-CM

## 2022-06-02 PROCEDURE — 71250 CT THORAX DX C-: CPT | Mod: 26

## 2022-06-02 PROCEDURE — 71250 CT THORAX DX C-: CPT

## 2022-07-07 ENCOUNTER — APPOINTMENT (OUTPATIENT)
Dept: ELECTROPHYSIOLOGY | Facility: CLINIC | Age: 57
End: 2022-07-07

## 2022-07-22 NOTE — ED PROVIDER NOTE - NSCAREINITIATED _GEN_ER
MTM referral from: Transitions of Care (recent hospital discharge or ED visit)    MTM referral outreach attempt #2 on July 22, 2022 at 2:35 PM      Outcome: Patient not reachable after several attempts, will route to [facility] Pharmacist/Provider as an FYI.  [facility] scheduling number is [phone].  Thank you for the referral.    Cherry Malik [facility]        Nicanor Kendall(PA)

## 2022-09-01 ENCOUNTER — RX RENEWAL (OUTPATIENT)
Age: 57
End: 2022-09-01

## 2022-10-06 ENCOUNTER — LABORATORY RESULT (OUTPATIENT)
Age: 57
End: 2022-10-06

## 2022-10-06 ENCOUNTER — APPOINTMENT (OUTPATIENT)
Dept: OTOLARYNGOLOGY | Facility: CLINIC | Age: 57
End: 2022-10-06

## 2022-10-06 VITALS
SYSTOLIC BLOOD PRESSURE: 136 MMHG | HEIGHT: 67 IN | OXYGEN SATURATION: 100 % | DIASTOLIC BLOOD PRESSURE: 87 MMHG | BODY MASS INDEX: 31.71 KG/M2 | HEART RATE: 91 BPM | WEIGHT: 202 LBS

## 2022-10-06 DIAGNOSIS — J01.00 ACUTE MAXILLARY SINUSITIS, UNSPECIFIED: ICD-10-CM

## 2022-10-06 PROCEDURE — 99215 OFFICE O/P EST HI 40 MIN: CPT | Mod: 25

## 2022-10-06 PROCEDURE — 31575 DIAGNOSTIC LARYNGOSCOPY: CPT

## 2022-10-06 NOTE — PROCEDURE
[Image(s) Captured] : image(s) captured and filed [Unable to Cooperate with Mirror] : patient unable to cooperate with mirror [Gag Reflex] : gag reflex preventing mirror examination [Topical Lidocaine] : topical lidocaine [Oxymetazoline HCl] : oxymetazoline HCl [Flexible Endoscope] : examined with the flexible endoscope [Serial Number: ___] : Serial Number: [unfilled] [de-identified] : The nasal septum is minimally deviated to the left. There are no masses or polyps and the nasal mucosa is normal. There is mucopus draining from the left middle meatus into the NP. The choanae and posterior nasopharynx are normal without masses or drainage. The Eustachian tube orifices appear patent. The pharynx, including the posterior and lateral pharyngeal walls, the vallecula and base of tongue are normal without ulcerations, lesions or masses. The hypopharynx including the pyriform sinuses open well without pooling of secretions, mucosal lesions or masses. The supraglottic larynx including the epiglottis, petiole, glossoepiglottic folds and pharyngoepiglottic folds are normal without mucosal lesions, ulcerations or masses. The glottis reveals normal false vocal folds. The true vocal folds are glistening white, tense and of equal length, without paralysis, having symmetric mobility on adduction and abduction. There are no mucosal lesions and normal mucosa but with tiny stable mid anterior bilateral vocal fold swellings in the moore's node position.  The posterior cricoid area has healthy pink mucosa in the interarytenoid area and esophageal inlet. There is mild thickening of the interarytenoid mucosa but no erythema. The trachea is clear without narrowing, deviation or lesions. \par  [de-identified] : papillary thyroid carcinoma and cervical lymph nodes being monitored. Recent treatment for Bronchitis and hoarseness.

## 2022-10-06 NOTE — HISTORY OF PRESENT ILLNESS
[de-identified] : Janae is a 49 Y/O female who had undergone a partial thyroidectomy and subsequent completion thyroidectomy in Oct 2004 and PEDRO remnant ablation.  She had been followed by Dulce Maria Rodriguez, her endocrinologist with [facility]. Her weight has been stable, no voice changes other than mild raspiness, and denied SOB or new neck mass. On her last US imaging of the neck, a round, hypoechoic,  level III node was identified without a fatty hilum and suspicious for metastatic cancer but measured only  7 x 6 x 5mm. Her thyroglobulin has remained low and Tg antibodies are negative. \par  [FreeTextEntry1] : Janae is being monitored after a completion total thyroidectomy and I-131 remnant ablation in 2004.  She had a borderline rounded left level III node in 2013 that did not change on subsequent US exams and there were no cystic changes, or microcalcifications. On the report dated 03/15/18 there were no abnormal lymph nodes.  Again noted is a left level III lymph node that is rounded in appearance but stable when compared with the last prior ultrasound.  She is currently on Synthroid 200 mcg x 3 days and 175 x 4 days. Her TSH is now in the low normal range at 1.5 and Tg / TgAB undetectable but recent Tg level not obtained. She had a COVID infection in March 2020 and a brief period of A Fib.  She is being followed Dulce Maria Rodriguez MD.  Her weight had increased slightly after COVID -19 infection and stable now. She developed A Fib after COVID started Eliquis x 3 months and now has a loop implant and monitored off anticoagulation.  She is now on Amlodipine and Losartan for HTN.  She has no new palpable masses in her neck, she denies dysphagia, shortness of breath, neck pain or pressure.  She was treated for severe GERD and better now off antacids. Her last neck ultrasound was done on 01/13/2021.  This showed no evidence of recurrent thyroid cancer or change in the previously described non-enlarged and morphologically normal lymph nodes.  She now complains hoarseness and PND after treated for a bronchitis 2 1/2 weeks ago while traveling in [facility].  He continues on Augmentin and had finished inhalers.  She is much improved.  Never had fever and her COVID test was negative.  She snores occasionally but not aware if she is a mouth breather.  She denies excessive daytime sleepiness.  She is not using any inhalers now. She denies fever, body aches, cough, cyanosis, chest burning, anosmia or recent known COVID exposures.  All family members at home are well. She is vaccinated and boosted.

## 2022-10-06 NOTE — CONSULT LETTER
[Dear  ___] : Dear  [unfilled], [Consult Letter:] : I had the pleasure of evaluating your patient, [unfilled]. [Please see my note below.] : Please see my note below. [Consult Closing:] : Thank you very much for allowing me to participate in the care of this patient.  If you have any questions, please do not hesitate to contact me. [Sincerely,] : Sincerely, [Jorge Louis M.D., FACS, [facility]] : Jorge Louis M.D., FACS, [facility] [Director, [facility]] : Director, [facility] [[facility]] : [facility] [[facility]] : [facility]  [Certified in Neck Ultrasound/ ECNU & AIUM] : Certified in Neck Ultrasound/ ECNU & AIUM [] :  [Otolaryngology/Head & Neck Surgery] : Otolaryngology/Head & Neck Surgery [[facility]] : [facility] [FreeTextEntry3] : \par Jorge Louis M.D., FACS, ECNU\par Director Center for [facility] Parathyroid [facility]\par [facility]\par Certified in Thyroid/Parathyroid/Neck Ultrasound, ECNU/ AIUM\par \par , Department of Otolaryngology\par [facility]\par

## 2022-10-06 NOTE — REASON FOR VISIT
[FreeTextEntry2] : followup thyroidectomy with completion in October 2004 and a new complaint of bronchitis and PND/hoarseness after recent travel to [place]. [FreeTextEntry1] : Endocrinologist and referrer is Dulce Maria Rodriguez MD,  PCP is Kevin Yi MD,  [facility] [place] [address] [place]

## 2022-10-10 LAB
THYROGLOB AB SERPL-ACNC: <20 IU/ML
THYROGLOB SERPL-MCNC: <0.2 NG/ML

## 2022-10-26 ENCOUNTER — APPOINTMENT (OUTPATIENT)
Dept: INTERNAL MEDICINE | Facility: CLINIC | Age: 57
End: 2022-10-26

## 2022-10-26 VITALS
WEIGHT: 207 LBS | OXYGEN SATURATION: 97 % | HEIGHT: 67 IN | HEART RATE: 90 BPM | DIASTOLIC BLOOD PRESSURE: 81 MMHG | BODY MASS INDEX: 32.49 KG/M2 | TEMPERATURE: 97.9 F | SYSTOLIC BLOOD PRESSURE: 121 MMHG

## 2022-10-26 DIAGNOSIS — Z01.818 ENCOUNTER FOR OTHER PREPROCEDURAL EXAMINATION: ICD-10-CM

## 2022-10-26 PROCEDURE — 99214 OFFICE O/P EST MOD 30 MIN: CPT

## 2022-10-26 RX ORDER — POLYETHYLENE GLYCOL 3350 17 G/17G
17 POWDER, FOR SOLUTION ORAL
Qty: 1 | Refills: 0 | Status: COMPLETED | COMMUNITY
Start: 2021-12-08 | End: 2022-10-26

## 2022-10-26 RX ORDER — LOSARTAN POTASSIUM 25 MG/1
25 TABLET, FILM COATED ORAL
Qty: 90 | Refills: 3 | Status: COMPLETED | COMMUNITY
Start: 2020-06-03 | End: 2022-10-26

## 2022-10-26 NOTE — PLAN
[FreeTextEntry1] : recommendations vaccines  fu    her last eye exam was  4/22  and dental   recently.

## 2022-10-26 NOTE — PAST MEDICAL HISTORY
[Menstruating] : menstruating [Definite ___ (Date)] : the last menstrual period was [unfilled] [FreeTextEntry1] : started to spotted

## 2022-10-26 NOTE — HISTORY OF PRESENT ILLNESS
[FreeTextEntry1] : fu  [de-identified] :   pt states she is feeling well  but had bronchitis last month  and was placed on amoxicillin and was  seen in a urgent care while in [place].  she was tested for covid and was  negative.     She had her flu vaccine.  and covid bival  vaccine.     she -denies any headaches, nausea, vomiting, fever, chills, sweats, chest pain, chest pressure, diarrhea, constipation, dysphagia, sour taste in the mouth, dizziness, leg swelling, leg pain, myalgias, arthralgias, itchy eyes, itchy ears, heartburn, or reflux.\par    She states her diabetes  is not optimal last  7.2 %   She is not taking  her atorvastatin  and her ldl was  not optimal at 70  .  She sees her endocrinologist Dr Rodriguez.\par  pt had ct of lung \par  CT CHEST  - ORDERED BY: KAYCE AVALOS\par \par \par PROCEDURE DATE:  06/02/2022\par \par \par \par INTERPRETATION:  Clinical information: Pulmonary nodule. Exam is compared to previous study of 10/29/2020.\par \par CT scan of the chest was obtained without administration of intravenous contrast.\par \par No hilar and or mediastinal adenopathy is noted.\par \par Heart is normal in size. No pericardial effusion is noted.\par \par No endobronchial lesions are noted. Few less than 0.4 cm nodules noted in the right middle and both lower lobes are noted. They're unchanged when compared to previous exam. No pleural effusions are noted.\par \par Below the diaphragm, visualized portions of the abdomen demonstrate low-attenuation lesion in the liver which is unchanged when compared to previous exam.\par \par Visualized osseous structures are within normal limits.\par \par IMPRESSION: Stable few very small nodules in the right middle and both lower lobes when compared to previous exam.\par \par   pt had mammogram  in July 2022 \par

## 2022-10-26 NOTE — HEALTH RISK ASSESSMENT
[Yes] : Yes [2 - 3 times a week (3 pts)] : 2 - 3  times a week (3 points) [1 or 2 (0 pts)] : 1 or 2 (0 points) [Never (0 pts)] : Never (0 points) [No] : In the past 12 months have you used drugs other than those required for medical reasons? No [No falls in past year] : Patient reported no falls in the past year [0] : 2) Feeling down, depressed, or hopeless: Not at all (0) [PHQ-2 Negative - No further assessment needed] : PHQ-2 Negative - No further assessment needed [de-identified] : ent  Dr Louis , Card Dr Jackson,   endocrinology Dr Rodriguez [Audit-CScore] : 3 [de-identified] : pilates , walks  [de-identified] : low carb  daibetic diet  [[id number]] : 0

## 2022-10-26 NOTE — ASSESSMENT
[FreeTextEntry1] : 1  dm  ---The following has been discussed:---\par -Targets for weight and HgA1c have been discussed with patient \par -FS goals have been reviewed with the patient in detail:\par AM <130 post meal<160-180\par -Diet and weight goals have been discussed with the patient in detail.\par -The importance of exercise in the treatment of diabetes has been discussed \par with the patient in detail.\par -Extensive dietary advice provided to patient and the need to avoid concentrated \par sweets/simple carbohydrates and to ensure to consume protein with each meal. \par -Patient instructed to limit carbohydrates to 60 gms per meal and 15 gms per \par snacks. \par -Patient to keep a blood sugar log to check fasting and before meals\par -Patient instructed on importance of daily feet inspection and to reports any \par open lesions to physician promptly\par 2 bmi 32   Weight loss, exercise, and diet control were discussed and are highly encouraged. Treatment options were given such as, aqua therapy, and contacting a nutritionist. Recommended to use the elliptical, stationary bike, less use of treadmill. Mindful eating was explained to the patient Obesity is associated with worsening asthma, shortness of breath, and potential for cardiac disease, diabetes, and other underlying medical conditions.\par 3 hld  to lower  LDL and non HDL  cholesterol levels     1 limit your intake of foods full of saturated fats  , trans fats, and dietary cholesterol .  Food with a lot of saturated fate include butter, fatty flesh like red meat, full fat and low fat dairy  products  , palm oil and coconut oil .   If you see partially hydrogenated fat in the ingredient  list of food label that food has trans fats.  Top sources of dietary chol include egg yolks , organ meats and shell fish.  one Type of fat omega 3 Fatty acids has been shown to protect against heart disease  . Good sources are cold water fish like salmon, mackerel , halibut ., trout  herring and sardines.     Limit  your intake of meat , poultry and fish to no more than 3.5  ounces per day     Eat a lot more fiber rich foods  like beans , oats, barley fruits and vegetables   .  Food naturally rich in soluble fiber  are good at lowering cholesterol .    Excellent choices  are  oats , oat bran , barley , peas , yams sweet potatoes and legumes  or beans .  Good fruit sources are berries passion fruit, oranges pears,, apricots , apples  and nectarines  .    choose protein rich plant foods   such as legumes or beans nuts and seeds over meat.     Lose as much weight as possible and exercise   Take plant sterol supplements   .A Daily intake  of 1-2 gms  of plant sterols  lowers ldl .    Best choice is supplements  such as Cholestoff  by natures made   because they don’t have calories  sugar trans fats   an salt  of many foods enriched with plant sterols.    Take  Metamucil or psyllium   .   Studies have shown 9-10 gms as daily of psyllium   the equivalent of  about  3 teaspoons  of sugar free Metamucil   reduced LDL levels  .\par \par 4 hpn  well controlled low salt diet \par 5. thyroid cancer -   stable fu with  endocrinologist   \par 6 atrial fib resolved at present time.\par

## 2022-10-26 NOTE — PHYSICAL EXAM
[Normal Sclera/Conjunctiva] : normal sclera/conjunctiva [PERRL] : pupils equal round and reactive to light [Normal Oropharynx] : the oropharynx was normal [No JVD] : no jugular venous distention [Supple] : supple [Normal Rate] : normal rate  [Regular Rhythm] : with a regular rhythm [Normal S1, S2] : normal S1 and S2 [No Edema] : there was no peripheral edema [No Extremity Clubbing/Cyanosis] : no extremity clubbing/cyanosis [Normal Gait] : normal gait [Normal] : affect was normal and insight and judgment were intact [Comprehensive Foot Exam Normal] : Right and left foot were examined and both feet are normal. No ulcers in either foot. Toes are normal and with full ROM.  Normal tactile sensation with monofilament testing throughout both feet

## 2022-10-27 LAB
ALBUMIN SERPL ELPH-MCNC: 4.7 G/DL
ALP BLD-CCNC: 61 U/L
ALT SERPL-CCNC: 20 U/L
ANION GAP SERPL CALC-SCNC: 11 MMOL/L
APPEARANCE: CLEAR
AST SERPL-CCNC: 21 U/L
BASOPHILS # BLD AUTO: 0.05 K/UL
BASOPHILS NFR BLD AUTO: 1.1 %
BILIRUB SERPL-MCNC: 0.2 MG/DL
BILIRUBIN URINE: NEGATIVE
BLOOD URINE: NEGATIVE
BUN SERPL-MCNC: 12 MG/DL
CALCIUM SERPL-MCNC: 9.3 MG/DL
CHLORIDE SERPL-SCNC: 102 MMOL/L
CHOLEST SERPL-MCNC: 189 MG/DL
CO2 SERPL-SCNC: 24 MMOL/L
COLOR: NORMAL
CREAT SERPL-MCNC: 0.49 MG/DL
CREAT SPEC-SCNC: 35 MG/DL
EGFR: 111 ML/MIN/1.73M2
EOSINOPHIL # BLD AUTO: 0.34 K/UL
EOSINOPHIL NFR BLD AUTO: 7.5 %
ESTIMATED AVERAGE GLUCOSE: 157 MG/DL
FRUCTOSAMINE SERPL-MCNC: 305 UMOL/L
GLUCOSE QUALITATIVE U: NEGATIVE
GLUCOSE SERPL-MCNC: 156 MG/DL
HBA1C MFR BLD HPLC: 7.1 %
HCT VFR BLD CALC: 43.8 %
HDLC SERPL-MCNC: 64 MG/DL
HGB BLD-MCNC: 14 G/DL
IMM GRANULOCYTES NFR BLD AUTO: 0.2 %
KETONES URINE: NEGATIVE
LDLC SERPL CALC-MCNC: 112 MG/DL
LEUKOCYTE ESTERASE URINE: NEGATIVE
LYMPHOCYTES # BLD AUTO: 1.61 K/UL
LYMPHOCYTES NFR BLD AUTO: 35.3 %
MAN DIFF?: NORMAL
MCHC RBC-ENTMCNC: 29.9 PG
MCHC RBC-ENTMCNC: 32 GM/DL
MCV RBC AUTO: 93.4 FL
MICROALBUMIN 24H UR DL<=1MG/L-MCNC: <1.2 MG/DL
MICROALBUMIN/CREAT 24H UR-RTO: NORMAL MG/G
MONOCYTES # BLD AUTO: 0.35 K/UL
MONOCYTES NFR BLD AUTO: 7.7 %
NEUTROPHILS # BLD AUTO: 2.2 K/UL
NEUTROPHILS NFR BLD AUTO: 48.2 %
NITRITE URINE: NEGATIVE
NONHDLC SERPL-MCNC: 125 MG/DL
PH URINE: 6.5
PLATELET # BLD AUTO: 360 K/UL
POTASSIUM SERPL-SCNC: 4.2 MMOL/L
PROT SERPL-MCNC: 7.5 G/DL
PROTEIN URINE: NEGATIVE
RBC # BLD: 4.69 M/UL
RBC # FLD: 14.1 %
SODIUM SERPL-SCNC: 138 MMOL/L
SPECIFIC GRAVITY URINE: 1.01
T4 FREE SERPL-MCNC: 1.6 NG/DL
T4 SERPL-MCNC: 9.2 UG/DL
THYROGLOB AB SERPL-ACNC: <20 IU/ML
THYROGLOB SERPL-MCNC: <0.2 NG/ML
TRIGL SERPL-MCNC: 69 MG/DL
TSH SERPL-ACNC: 0.74 UIU/ML
UROBILINOGEN URINE: NORMAL
WBC # FLD AUTO: 4.56 K/UL

## 2022-11-01 ENCOUNTER — APPOINTMENT (OUTPATIENT)
Dept: CARDIOLOGY | Facility: CLINIC | Age: 57
End: 2022-11-01

## 2022-11-09 RX ORDER — LOSARTAN POTASSIUM 25 MG/1
25 TABLET, FILM COATED ORAL
Qty: 90 | Refills: 0 | Status: ACTIVE | COMMUNITY
Start: 2022-04-06 | End: 1900-01-01

## 2022-12-08 ENCOUNTER — APPOINTMENT (OUTPATIENT)
Dept: INTERNAL MEDICINE | Facility: CLINIC | Age: 57
End: 2022-12-08

## 2022-12-08 VITALS
OXYGEN SATURATION: 98 % | DIASTOLIC BLOOD PRESSURE: 78 MMHG | BODY MASS INDEX: 32.49 KG/M2 | HEIGHT: 67 IN | TEMPERATURE: 97.9 F | HEART RATE: 92 BPM | SYSTOLIC BLOOD PRESSURE: 114 MMHG | WEIGHT: 207 LBS

## 2022-12-08 DIAGNOSIS — C77.9 SECONDARY AND UNSPECIFIED MALIGNANT NEOPLASM OF LYMPH NODE, UNSPECIFIED: ICD-10-CM

## 2022-12-08 DIAGNOSIS — I48.91 UNSPECIFIED ATRIAL FIBRILLATION: ICD-10-CM

## 2022-12-08 DIAGNOSIS — R59.0 LOCALIZED ENLARGED LYMPH NODES: ICD-10-CM

## 2022-12-08 DIAGNOSIS — I10 ESSENTIAL (PRIMARY) HYPERTENSION: ICD-10-CM

## 2022-12-08 DIAGNOSIS — E78.5 HYPERLIPIDEMIA, UNSPECIFIED: ICD-10-CM

## 2022-12-08 DIAGNOSIS — I48.0 PAROXYSMAL ATRIAL FIBRILLATION: ICD-10-CM

## 2022-12-08 DIAGNOSIS — E10.9 TYPE 1 DIABETES MELLITUS W/OUT COMPLICATIONS: ICD-10-CM

## 2022-12-08 DIAGNOSIS — Z00.00 ENCOUNTER FOR GENERAL ADULT MEDICAL EXAMINATION W/OUT ABNORMAL FINDINGS: ICD-10-CM

## 2022-12-08 PROCEDURE — 99396 PREV VISIT EST AGE 40-64: CPT

## 2022-12-08 RX ORDER — INSULIN LISPRO 100 [IU]/ML
100 INJECTION, SOLUTION INTRAVENOUS; SUBCUTANEOUS
Qty: 3 | Refills: 3 | Status: COMPLETED | COMMUNITY
Start: 2018-02-15 | End: 2022-12-08

## 2022-12-08 RX ORDER — INSULIN ASPART 100 [IU]/ML
100 INJECTION, SOLUTION INTRAVENOUS; SUBCUTANEOUS
Qty: 90 | Refills: 0 | Status: ACTIVE | COMMUNITY
Start: 2022-11-15

## 2022-12-08 RX ORDER — DICLOFENAC SODIUM 1% 10 MG/G
1 GEL TOPICAL
Qty: 100 | Refills: 0 | Status: COMPLETED | COMMUNITY
Start: 2022-07-31

## 2022-12-08 RX ORDER — ATORVASTATIN CALCIUM 10 MG/1
10 TABLET, FILM COATED ORAL DAILY
Qty: 1 | Refills: 3 | Status: ACTIVE | COMMUNITY
Start: 2022-12-08

## 2022-12-08 RX ORDER — MEDROXYPROGESTERONE ACETATE 10 MG/1
10 TABLET ORAL
Qty: 10 | Refills: 0 | Status: COMPLETED | COMMUNITY
Start: 2022-07-14

## 2022-12-08 NOTE — COUNSELING
[Fall prevention counseling provided] : Fall prevention counseling provided [Adequate lighting] : Adequate lighting [No throw rugs] : No throw rugs [Use proper foot wear] : Use proper foot wear [Use recommended devices] : Use recommended devices [Sleep ___ hours/day] : Sleep [unfilled] hours/day [Engage in a relaxing activity] : Engage in a relaxing activity [Plan in advance] : Plan in advance [Potential consequences of obesity discussed] : Potential consequences of obesity discussed [Benefits of weight loss discussed] : Benefits of weight loss discussed [Structured Weight Management Program suggested:] : Structured weight management program suggested [Encouraged to maintain food diary] : Encouraged to maintain food diary [Encouraged to increase physical activity] : Encouraged to increase physical activity [Encouraged to use exercise tracking device] : Encouraged to use exercise tracking device [Target Wt Loss Goal ___] : Weight Loss Goals: Target weight loss goal [unfilled] lbs [Weigh Self Weekly] : weigh self weekly [Decrease Portions] : decrease portions [____ min/wk Activity] : [unfilled] min/wk activity [Keep Food Diary] : keep food diary [FreeTextEntry1] : diabetic diet  [FreeTextEntry2] : wt 207  bmi 32

## 2022-12-08 NOTE — ASSESSMENT
[FreeTextEntry1] : health She is up to date with vaccines but needs Pneumovax  25    She is up to date with colon cancer  screening,  eye and dental  exams  and mammogram bone density   \par 2  bmi  32   Weight loss, exercise, and diet control were discussed and are highly encouraged. Treatment options were given such as, aqua therapy, and contacting a nutritionist. Recommended to use the elliptical, stationary bike, less use of treadmill. Mindful eating was explained to the patient Obesity is associated with worsening asthma, shortness of breath, and potential for cardiac disease, diabetes, and other underlying medical conditions.\par 3  dm  ---The following has been discussed:---\par -Targets for weight and HgA1c have been discussed with patient \par -FS goals have been reviewed with the patient in detail:\par AM <130 post meal<160-180\par -Diet and weight goals have been discussed with the patient in detail.\par -The importance of exercise in the treatment of diabetes has been discussed \par with the patient in detail.\par -Extensive dietary advice provided to patient and the need to avoid concentrated \par sweets/simple carbohydrates and to ensure to consume protein with each meal. \par -Patient instructed to limit carbohydrates to 60 gms per meal and 15 gms per \par snacks. \par -Patient to keep a blood sugar log to check fasting and before meals\par -Patient instructed on importance of daily feet inspection and to reports any \par open lesions to physician promptly\par \par she is up to date with  podiatrist and eye exams \par 4. hld  to lower  LDL and non HDL  cholesterol levels     1 limit your intake of foods full of saturated fats  , trans fats, and dietary cholesterol .  Food with a lot of saturated fate include butter, fatty flesh like red meat, full fat and low fat dairy  products  , palm oil and coconut oil .   If you see partially hydrogenated fat in the ingredient  list of food label that food has trans fats.  Top sources of dietary chol include egg yolks , organ meats and shell fish.  one Type of fat omega 3 Fatty acids has been shown to protect against heart disease  . Good sources are cold water fish like salmon, mackerel , halibut ., trout  herring and sardines.     Limit  your intake of meat , poultry and fish to no more than 3.5  ounces per day     Eat a lot more fiber rich foods  like beans , oats, barley fruits and vegetables   .  Food naturally rich in soluble fiber  are good at lowering cholesterol .    Excellent choices  are  oats , oat bran , barley , peas , yams sweet potatoes and legumes  or beans .  Good fruit sources are berries passion fruit, oranges pears,, apricots , apples  and nectarines  .    choose protein rich plant foods   such as legumes or beans nuts and seeds over meat.     Lose as much weight as possible and exercise   Take plant sterol supplements   .A Daily intake  of 1-2 gms  of plant sterols  lowers ldl .    Best choice is supplements  such as Cholestoff  by natures made   because they don’t have calories  sugar trans fats   an salt  of many foods enriched with plant sterols.    Take  Metamucil or psyllium   .   Studies have shown 9-10 gms as daily of psyllium   the equivalent of  about  3 teaspoons  of sugar free Metamucil   reduced LDL levels  .\par 5.  thyroid cancer  fu with endocrinologist   presently doing well  mets to ln and had us  opf ln and surveillance   Dr Danae Rodriguez \par 6.  atrial fib  resolved  \par

## 2022-12-08 NOTE — PHYSICAL EXAM
[Well Developed] : well developed [Normal Voice Quality] : was normal [Normal Verbal Skills] : the patient had normal verbal communication skills [Normal Nonverbal Skills] : normal nonverbal communication skills were demonstrated [Conjunctiva] : the conjunctiva were normal in both eyes [PERRL] : pupils were equal in size, round, and reactive to light [EOM Intact] : extraocular movements were intact [Normal Appearance] : was normal in appearance [Neck Supple] : was supple [Rate ___] : at [unfilled] breaths per minute [Normal Rhythm/Effort] : normal respiratory rhythm and effort [Clear Bilaterally] : the lungs were clear to auscultation bilaterally [Normal to Percussion] : the lungs were normal to percussion [Heart Rate ___] : [unfilled] bpm [Rhythm Regular] : regular [Normal Rate] : normal [Normal S1] : normal S1 [Normal S2] : normal S2 [No Murmur] : no murmurs heard [No Pitting Edema] : no pitting edema present [2+] : left 2+ [No Abnormalities] : the abdominal aorta was not enlarged and no bruit was heard [Examination Of The Breasts] : a normal appearance [No Discharge] : no discharge [Soft, Nontender] : the abdomen was soft and nontender [No Mass] : no masses were palpated [No HSM] : no hepatosplenomegaly noted [Abdomen Hernia Umbilical] : an umbilical hernia was present [] : which was reducible [No Lymphangitis] : no lymphangitis observed [Normal Kyphosis] : normal kyphosis [No Visual Abnormalities] : no visible abnormalities [Normal Lordosis] : normal lordosis [No Scoliosis] : no scoliosis [No Tenderness to Palpation] : no spine tenderness on palpation [No Masses] : no masses [Full ROM] : full ROM [No Pain with ROM] : no pain with motion in any direction [Intact] : all reflexes within normal limits bilaterally [Normal Station and Gait] : the gait and station were normal [Normal Motor Tone] : the muscle tone was normal [Involuntary Movements] : no involuntary movements were seen [Normal Scalp] : inspection of the scalp showed no abnormalities [Examination Of The Hair] : texture and distribution of hair was normal [Complexion Medium] : medium complexion [Normal] : the deep tendon reflexes were normal [Normal Mental Status] : the patient's orientation, memory, attention, language and fund of knowledge were normal [Appropriate] : appropriate [Comprehensive Foot Exam Normal] : Right and left foot were examined and both feet are normal. No ulcers in either foot. Toes are normal and with full ROM.  Normal tactile sensation with monofilament testing throughout both feet [Well Nourished] : not well nourished [JVP Elevated ___cm] : the JVP was not elevated [S3] : no S3 [S4] : no S4 [Rt] : no varicose veins of the right leg [Lt] : no varicose veins of the left leg [Right Carotid Bruit] : no bruit heard over the right carotid [Left Carotid Bruit] : no bruit heard over the left carotid [Right Femoral Bruit] : no bruit heard over the right femoral artery [Left Femoral Bruit] : no bruit heard over the left femoral artery [Bruit] : no bruit heard [Postauricular Lymph Nodes Enlarged Bilaterally] : nodes not enlarged [Preauricular Lymph Nodes Enlarged Bilaterally] : nodes not enlarged [Submandibular Lymph Nodes Enlarged Bilaterally] : nodes not enlarged [Suboccipital Lymph Nodes Enlarged Bilaterally] : nodes not enlarged [Submental Lymph Nodes Enlarged] : nodes not enlarged [Cervical Lymph Nodes Enlarged Posterior Bilaterally] : nodes not enlarged [Cervical Lymph Nodes Enlarged Anterior Bilaterally] : nodes not enlarged [Supraclavicular Lymph Nodes Enlarged Bilaterally] : nodes not enlarged [Axillary Lymph Nodes Enlarged Bilaterally] : nodes not enlarged [Epitrochlear Lymph Nodes Enlarged Bilaterally] : nodes not enlarged [Femoral Lymph Nodes Enlarged Bilaterally] : nodes not enlarged [Inguinal Lymph Nodes Enlarged Bilaterally] : nodes not enlarged [Abnormal Color] : normal color and pigmentation [Skin Lesions 1] : no skin lesions were observed [Tattoo - Single] : no tattoos observed [Skin Turgor Decreased] : normal skin turgor [Impaired judgment] : intact judgment [Impaired Insight] : intact insight [de-identified] : teeth in good repair   tongue normal

## 2022-12-08 NOTE — HEALTH RISK ASSESSMENT
[Excellent] : ~his/her~ current health as excellent [Very Good] : ~his/her~  mood as very good [Never] : Never [No] : In the past 12 months have you used drugs other than those required for medical reasons? No [No falls in past year] : Patient reported no falls in the past year [0] : 2) Feeling down, depressed, or hopeless: Not at all (0) [PHQ-2 Negative - No further assessment needed] : PHQ-2 Negative - No further assessment needed [Patient reported mammogram was normal] : Patient reported mammogram was normal [Patient reported PAP Smear was normal] : Patient reported PAP Smear was normal [Patient reported bone density results were normal] : Patient reported bone density results were normal [Patient reported colonoscopy was normal] : Patient reported colonoscopy was normal [HIV test declined] : HIV test declined [Hepatitis C test offered] : Hepatitis C test offered [None] : None [With Family] : lives with family [# of Members in Household ___] :  household currently consist of [unfilled] member(s) [Unemployed] : unemployed [College] : College [] :  [# Of Children ___] : has [unfilled] children [Sexually Active] : sexually active [Feels Safe at Home] : Feels safe at home [Fully functional (bathing, dressing, toileting, transferring, walking, feeding)] : Fully functional (bathing, dressing, toileting, transferring, walking, feeding) [Fully functional (using the telephone, shopping, preparing meals, housekeeping, doing laundry, using] : Fully functional and needs no help or supervision to perform IADLs (using the telephone, shopping, preparing meals, housekeeping, doing laundry, using transportation, managing medications and managing finances) [Smoke Detector] : smoke detector [Carbon Monoxide Detector] : carbon monoxide detector [Safety elements used in home] : safety elements used in home [Seat Belt] :  uses seat belt [Sunscreen] : uses sunscreen [FreeTextEntry1] : none  [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   [de-identified] : Pilates exercises  [de-identified] :  diabetic diet  [ENG7Satcf] : 0 [Change in mental status noted] : No change in mental status noted [Language] : denies difficulty with language [Behavior] : denies difficulty with behavior [Learning/Retaining New Information] : denies difficulty learning/retaining new information [Handling Complex Tasks] : denies difficulty handling complex tasks [Reasoning] : denies difficulty with reasoning [Spatial Ability and Orientation] : denies difficulty with spatial ability and orientation [Reports changes in hearing] : Reports no changes in hearing [Reports changes in vision] : Reports no changes in vision [Reports changes in dental health] : Reports no changes in dental health [Guns at Home] : no guns at home [Travel to Developing Areas] : does not  travel to developing areas [TB Exposure] : is not being exposed to tuberculosis [Caregiver Concerns] : does not have caregiver concerns [MammogramDate] : 7/22 [PapSmearDate] : 7/22 [BoneDensityDate] : 9/19 [ColonoscopyDate] : 2015 [de-identified] : last eye exam   8 months ago  [de-identified] : last dental 2months  [AdvancecareDate] : 12/22

## 2022-12-08 NOTE — HISTORY OF PRESENT ILLNESS
[FreeTextEntry1] : cpe  [de-identified] : Pt is a 57yr old woman with hx of diabetes type 1, hld, paroxysmal atrial ca , thyroid cancer, reflux , and hypertension who is here for her cpe.   she -denies any headaches, nausea, vomiting, fever, chills, sweats, chest pain, chest pressure, diarrhea, constipation, dysphagia, sour taste in the mouth, dizziness, leg swelling, leg pain, myalgias, arthralgias, itchy eyes, itchy ears, heartburn, or reflux.\par \par \par she had ct scan of chestCT CHEST  - ORDERED BY: KAYCE AVALOS\par \par \par PROCEDURE DATE:  06/02/2022\par \par \par \par INTERPRETATION:  Clinical information: Pulmonary nodule. Exam is compared to previous study of 10/29/2020.\par \par CT scan of the chest was obtained without administration of intravenous contrast.\par \par No hilar and or mediastinal adenopathy is noted.\par \par Heart is normal in size. No pericardial effusion is noted.\par \par No endobronchial lesions are noted. Few less than 0.4 cm nodules noted in the right middle and both lower lobes are noted. They're unchanged when compared to previous exam. No pleural effusions are noted.\par \par Below the diaphragm, visualized portions of the abdomen demonstrate low-attenuation lesion in the liver which is unchanged when compared to previous exam.\par \par Visualized osseous structures are within normal limits.\par \par IMPRESSION: Stable few very small nodules in the right middle and both lower lobes when compared to previous exam.\par \par

## 2023-01-16 ENCOUNTER — RX RENEWAL (OUTPATIENT)
Age: 58
End: 2023-01-16

## 2023-01-16 RX ORDER — ATORVASTATIN CALCIUM 10 MG/1
10 TABLET, FILM COATED ORAL
Qty: 90 | Refills: 3 | Status: ACTIVE | COMMUNITY
Start: 2021-12-27 | End: 1900-01-01

## 2023-04-10 ENCOUNTER — APPOINTMENT (OUTPATIENT)
Dept: INTERNAL MEDICINE | Facility: CLINIC | Age: 58
End: 2023-04-10

## 2023-04-10 VITALS
DIASTOLIC BLOOD PRESSURE: 83 MMHG | HEIGHT: 67 IN | BODY MASS INDEX: 31.55 KG/M2 | SYSTOLIC BLOOD PRESSURE: 119 MMHG | HEART RATE: 90 BPM | OXYGEN SATURATION: 98 % | TEMPERATURE: 97.3 F | WEIGHT: 201 LBS

## 2023-05-12 NOTE — PROCEDURAL SAFETY CHECKLIST WITH OR WITHOUT SEDATION - NSPREVERIFYSED_GEN_ALL_CORE
See 5-12-23 telephone encounter opened under Dr. Sultana for continued documentation. Closing encounter.      done

## 2023-06-21 ENCOUNTER — TRANSCRIPTION ENCOUNTER (OUTPATIENT)
Age: 58
End: 2023-06-21

## 2023-06-27 ENCOUNTER — APPOINTMENT (OUTPATIENT)
Dept: INTERNAL MEDICINE | Facility: CLINIC | Age: 58
End: 2023-06-27

## 2023-07-03 NOTE — ED CDU PROVIDER SUBSEQUENT DAY NOTE - GASTROINTESTINAL, MLM
No protocol for requested medication     LORazepam 1 MG Oral Tablet    Last office visit date: 6/15/23    Last Filled:  6/7/23    Order pended, routed to clinician for review.     
Abdomen soft, non-tender, no rebound, no guarding. No CVA tenderness b/l.
Abdomen soft, non-tender, no rebound, no guarding.

## 2023-10-05 ENCOUNTER — APPOINTMENT (OUTPATIENT)
Dept: OTOLARYNGOLOGY | Facility: CLINIC | Age: 58
End: 2023-10-05
Payer: COMMERCIAL

## 2023-10-05 VITALS
WEIGHT: 200 LBS | TEMPERATURE: 98 F | BODY MASS INDEX: 31.39 KG/M2 | HEIGHT: 67 IN | OXYGEN SATURATION: 98 % | DIASTOLIC BLOOD PRESSURE: 75 MMHG | HEART RATE: 92 BPM | SYSTOLIC BLOOD PRESSURE: 109 MMHG

## 2023-10-05 DIAGNOSIS — K21.9 ACUTE LARYNGITIS: ICD-10-CM

## 2023-10-05 DIAGNOSIS — C73 MALIGNANT NEOPLASM OF THYROID GLAND: ICD-10-CM

## 2023-10-05 DIAGNOSIS — J38.2 NODULES OF VOCAL CORDS: ICD-10-CM

## 2023-10-05 DIAGNOSIS — R49.9 UNSPECIFIED VOICE AND RESONANCE DISORDER: ICD-10-CM

## 2023-10-05 DIAGNOSIS — J04.0 ACUTE LARYNGITIS: ICD-10-CM

## 2023-10-05 DIAGNOSIS — R06.83 SNORING: ICD-10-CM

## 2023-10-05 DIAGNOSIS — E89.0 POSTPROCEDURAL HYPOTHYROIDISM: ICD-10-CM

## 2023-10-05 DIAGNOSIS — K21.9 GASTRO-ESOPHAGEAL REFLUX DISEASE W/OUT ESOPHAGITIS: ICD-10-CM

## 2023-10-05 PROCEDURE — 31575 DIAGNOSTIC LARYNGOSCOPY: CPT

## 2023-10-05 PROCEDURE — 99215 OFFICE O/P EST HI 40 MIN: CPT | Mod: 25

## 2023-12-19 NOTE — ED CDU PROVIDER INITIAL DAY NOTE - MOUTH NORMAL
Please f/u with your primary care doctor in 2-3 days.    You may take tylenol 650mg every 6 hours as needed for pain. Do not exceed 3500mg in 24 hours.    Return to the ED if you develop any new/concerning symptoms such as severe headache, numbness tingling, nausea, vision changes or any other concerning symptoms.       
normal mucosa

## 2023-12-21 ENCOUNTER — TRANSCRIPTION ENCOUNTER (OUTPATIENT)
Age: 58
End: 2023-12-21

## 2024-10-02 ENCOUNTER — NON-APPOINTMENT (OUTPATIENT)
Age: 59
End: 2024-10-02

## 2024-10-03 ENCOUNTER — APPOINTMENT (OUTPATIENT)
Dept: OTOLARYNGOLOGY | Facility: CLINIC | Age: 59
End: 2024-10-03
Payer: COMMERCIAL

## 2024-10-03 VITALS
OXYGEN SATURATION: 97 % | DIASTOLIC BLOOD PRESSURE: 83 MMHG | HEART RATE: 88 BPM | SYSTOLIC BLOOD PRESSURE: 119 MMHG | WEIGHT: 205 LBS | HEIGHT: 67 IN | BODY MASS INDEX: 32.18 KG/M2 | TEMPERATURE: 98 F

## 2024-10-03 DIAGNOSIS — C73 MALIGNANT NEOPLASM OF THYROID GLAND: ICD-10-CM

## 2024-10-03 DIAGNOSIS — R49.9 UNSPECIFIED VOICE AND RESONANCE DISORDER: ICD-10-CM

## 2024-10-03 DIAGNOSIS — E89.0 POSTPROCEDURAL HYPOTHYROIDISM: ICD-10-CM

## 2024-10-03 DIAGNOSIS — C77.9 SECONDARY AND UNSPECIFIED MALIGNANT NEOPLASM OF LYMPH NODE, UNSPECIFIED: ICD-10-CM

## 2024-10-03 DIAGNOSIS — J38.2 NODULES OF VOCAL CORDS: ICD-10-CM

## 2024-10-03 DIAGNOSIS — K21.9 GASTRO-ESOPHAGEAL REFLUX DISEASE W/OUT ESOPHAGITIS: ICD-10-CM

## 2024-10-03 PROCEDURE — 31575 DIAGNOSTIC LARYNGOSCOPY: CPT

## 2024-10-03 PROCEDURE — 99215 OFFICE O/P EST HI 40 MIN: CPT | Mod: 25

## 2024-10-03 NOTE — REASON FOR VISIT
[FreeTextEntry2] : follow up thyroidectomy with completion in October 2004 and hx of bronchitis and PND/hoarseness after recent travel to [place]. [FreeTextEntry1] : Endocrinologist is Kely Vines MD,  PCP is Lucas Upton MD,  [facility] [place]

## 2024-10-03 NOTE — PROCEDURE
[Image(s) Captured] : image(s) captured and filed [Unable to Cooperate with Mirror] : patient unable to cooperate with mirror [Gag Reflex] : gag reflex preventing mirror examination [Topical Lidocaine] : topical lidocaine [Oxymetazoline HCl] : oxymetazoline HCl [Flexible Endoscope] : examined with the flexible endoscope [Serial Number: ___] : Serial Number: [unfilled] [Hoarseness] : hoarseness not clearly evaluated by indirect laryngoscopy [de-identified] : Verbal informed consent was obtained for fiber optic laryngoscopy.  Findings: The nasal septum is minimally deviated to the left. There are no masses or polyps, and the nasal mucosa is normal. Nasal secretions are normal.  The choanae and posterior nasopharynx are normal without masses or drainage. The Eustachian tube orifices appear patent. The pharynx, including the posterior and lateral pharyngeal walls, the vallecula and base of tongue are normal without ulcerations, lesions or masses. The hypopharynx including the pyriform sinuses open well without pooling of secretions, mucosal lesions or masses. The supraglottic larynx including the epiglottis, petiole, glossoepiglottic folds and pharyngoepiglottic folds are normal without mucosal lesions, ulcerations or masses. The glottis reveals normal false vocal folds. The true vocal folds are glistening white, tense and of equal length, without paralysis, having symmetric mobility on adduction and abduction. There are no mucosal lesions and normal mucosa with less apparent tiny  mid anterior bilateral vocal fold swellings in the moore's node position.  The posterior cricoid area has healthy pink mucosa in the interarytenoid area and esophageal inlet. There is moderate pachydermia of the interarytenoid mucosa but no erythema. The trachea is clear without narrowing, deviation or lesions. -------------------------------------------------------------------------------------------------------------------------------------------------------------------------------------   [de-identified] : papillary thyroid carcinoma and cervical lymph nodes being monitored. Recent treatment for Bronchitis and hoarseness.

## 2024-10-03 NOTE — CONSULT LETTER
[Consult Letter:] : I had the pleasure of evaluating your patient, [unfilled]. [Please see my note below.] : Please see my note below. [Consult Closing:] : Thank you very much for allowing me to participate in the care of this patient.  If you have any questions, please do not hesitate to contact me. [Sincerely,] : Sincerely, [Jorge Louis M.D., FACS, [facility]] : Jorge Louis M.D., FACS, [facility] [Director, [facility]] : Director, [facility] [[facility]] : [facility] [[facility]] : [facility]  [Certified in Neck Ultrasound/ ECNU & AIUM] : Certified in Neck Ultrasound/ ECNU & AIUM [] :  [Otolaryngology/Head & Neck Surgery] : Otolaryngology/Head & Neck Surgery [[facility]] : [facility] [Dear  ___] : Dear  [unfilled], [DrAdalberto  ___] : Dr. LARA [FreeTextEntry3] : \par  Jorge Louis M.D., FACS, ECNU\par  Director [facility]\par  [facility]\par  Certified in Thyroid/Parathyroid/Neck Ultrasound, ECNU/ AIUM\par  \par  , Department of Otolaryngology\par  [facility]\par

## 2024-11-24 NOTE — ED PROVIDER NOTE - OBSERVING MD:
acetaminophen 325 mg oral tablet: 2 tab(s) orally every 6 hours As needed Temp greater or equal to 38C (100.4F), Mild Pain (1 - 3), Moderate Pain (4 - 6)  Acidophilus oral tablet: 2 tab(s) orally once a day  amLODIPine 10 mg oral tablet: 1 tab(s) orally once a day  carvedilol 6.25 mg oral tablet: 1 tab(s) orally every 12 hours  ferrous sulfate 324 mg (65 mg elemental iron) oral delayed release tablet: 1 tab(s) orally once a day  gabapentin 100 mg oral capsule: 1 cap(s) orally 3 times a day  Lantus Solostar Pen 100 units/mL subcutaneous solution: 45 unit(s) subcutaneous once a day (at bedtime) unit(s) subcutaneous once a day  Monodox 100 mg oral capsule: 1 cap(s) orally 2 times a day till 12/3  NovoLOG FlexPen 100 units/mL injectable solution: 15 unit(s) subcutaneous 3 times a day (with meals)  thiamine 100 mg oral tablet: 1 tab(s) orally once a day   Dr. Rush

## 2025-05-05 ENCOUNTER — APPOINTMENT (OUTPATIENT)
Dept: OTOLARYNGOLOGY | Facility: CLINIC | Age: 60
End: 2025-05-05
Payer: COMMERCIAL

## 2025-05-05 ENCOUNTER — NON-APPOINTMENT (OUTPATIENT)
Age: 60
End: 2025-05-05

## 2025-05-05 VITALS
TEMPERATURE: 98 F | HEIGHT: 67 IN | BODY MASS INDEX: 29.03 KG/M2 | DIASTOLIC BLOOD PRESSURE: 87 MMHG | HEART RATE: 91 BPM | OXYGEN SATURATION: 99 % | WEIGHT: 185 LBS | SYSTOLIC BLOOD PRESSURE: 131 MMHG

## 2025-05-05 DIAGNOSIS — R06.83 SNORING: ICD-10-CM

## 2025-05-05 DIAGNOSIS — Z98.890 OTHER SPECIFIED POSTPROCEDURAL STATES: ICD-10-CM

## 2025-05-05 DIAGNOSIS — Z90.89 OTHER SPECIFIED POSTPROCEDURAL STATES: ICD-10-CM

## 2025-05-05 DIAGNOSIS — E89.0 POSTPROCEDURAL HYPOTHYROIDISM: ICD-10-CM

## 2025-05-05 DIAGNOSIS — J04.0 ACUTE LARYNGITIS: ICD-10-CM

## 2025-05-05 DIAGNOSIS — C77.9 SECONDARY AND UNSPECIFIED MALIGNANT NEOPLASM OF LYMPH NODE, UNSPECIFIED: ICD-10-CM

## 2025-05-05 DIAGNOSIS — C73 MALIGNANT NEOPLASM OF THYROID GLAND: ICD-10-CM

## 2025-05-05 DIAGNOSIS — K21.9 ACUTE LARYNGITIS: ICD-10-CM

## 2025-05-05 PROCEDURE — 31575 DIAGNOSTIC LARYNGOSCOPY: CPT

## 2025-05-05 PROCEDURE — 99214 OFFICE O/P EST MOD 30 MIN: CPT | Mod: 25
